# Patient Record
Sex: MALE | Race: WHITE | Employment: OTHER | URBAN - METROPOLITAN AREA
[De-identification: names, ages, dates, MRNs, and addresses within clinical notes are randomized per-mention and may not be internally consistent; named-entity substitution may affect disease eponyms.]

---

## 2017-04-25 PROBLEM — E66.9 OBESITY: Status: ACTIVE | Noted: 2017-04-25

## 2017-04-25 PROBLEM — R06.02 SOB (SHORTNESS OF BREATH): Status: ACTIVE | Noted: 2017-04-25

## 2017-04-28 PROBLEM — G62.9 NERVE DISEASE, PERIPHERAL: Status: ACTIVE | Noted: 2017-04-28

## 2017-04-28 PROBLEM — M19.90 ARTHRITIS, DEGENERATIVE: Status: ACTIVE | Noted: 2017-04-28

## 2017-04-28 PROBLEM — E66.01 MORBID OBESITY DUE TO EXCESS CALORIES (HCC): Status: ACTIVE | Noted: 2017-04-25

## 2017-04-28 PROBLEM — I10 BP (HIGH BLOOD PRESSURE): Status: ACTIVE | Noted: 2017-04-28

## 2017-04-28 PROBLEM — H40.9 GLAUCOMA: Status: ACTIVE | Noted: 2017-04-28

## 2017-04-28 PROBLEM — E78.5 HYPERLIPIDEMIA: Status: ACTIVE | Noted: 2017-04-28

## 2017-04-28 PROBLEM — I11.9 HCD (HYPERTENSIVE CARDIOVASCULAR DISEASE): Status: ACTIVE | Noted: 2017-04-28

## 2017-05-11 ENCOUNTER — TELEPHONE (OUTPATIENT)
Dept: CARDIOTHORACIC SURGERY | Age: 67
End: 2017-05-11

## 2017-05-16 ENCOUNTER — TELEPHONE (OUTPATIENT)
Dept: CARDIOTHORACIC SURGERY | Age: 67
End: 2017-05-16

## 2017-05-16 PROBLEM — G47.00 INSOMNIA: Status: ACTIVE | Noted: 2017-05-16

## 2017-05-16 PROBLEM — E78.5 HYPERLIPIDEMIA: Chronic | Status: ACTIVE | Noted: 2017-04-28

## 2017-05-16 PROBLEM — N17.9 AKI (ACUTE KIDNEY INJURY) (HCC): Status: ACTIVE | Noted: 2017-05-16

## 2017-05-16 PROBLEM — J96.01 ACUTE HYPOXEMIC RESPIRATORY FAILURE (HCC): Status: ACTIVE | Noted: 2017-05-16

## 2017-05-16 PROBLEM — E16.2 HYPOGLYCEMIA: Status: ACTIVE | Noted: 2017-05-16

## 2017-05-16 PROBLEM — A41.9 SEPSIS (HCC): Status: ACTIVE | Noted: 2017-05-16

## 2017-05-16 PROBLEM — E66.01 MORBID OBESITY WITH BMI OF 70 AND OVER, ADULT (HCC): Chronic | Status: ACTIVE | Noted: 2017-04-25

## 2017-05-16 PROBLEM — R77.8 ELEVATED TROPONIN: Status: ACTIVE | Noted: 2017-05-16

## 2017-05-16 PROBLEM — R06.01 ORTHOPNEA: Status: ACTIVE | Noted: 2017-05-16

## 2017-05-16 PROBLEM — R79.89 ELEVATED TROPONIN: Status: ACTIVE | Noted: 2017-05-16

## 2017-05-16 PROBLEM — J18.9 LLL PNEUMONIA: Status: ACTIVE | Noted: 2017-05-16

## 2017-05-16 PROBLEM — D64.9 ANEMIA: Chronic | Status: ACTIVE | Noted: 2017-05-16

## 2017-05-16 PROBLEM — D64.9 ANEMIA: Status: ACTIVE | Noted: 2017-05-16

## 2017-05-17 PROBLEM — E78.2 MIXED HYPERLIPIDEMIA: Chronic | Status: ACTIVE | Noted: 2017-04-28

## 2017-06-01 ENCOUNTER — OFFICE VISIT (OUTPATIENT)
Dept: CARDIOTHORACIC SURGERY | Age: 67
End: 2017-06-01

## 2017-06-01 VITALS
SYSTOLIC BLOOD PRESSURE: 110 MMHG | BODY MASS INDEX: 49.44 KG/M2 | HEART RATE: 66 BPM | DIASTOLIC BLOOD PRESSURE: 60 MMHG | WEIGHT: 315 LBS | HEIGHT: 67 IN | OXYGEN SATURATION: 96 %

## 2017-06-01 DIAGNOSIS — I48.0 PAROXYSMAL A-FIB (HCC): Primary | ICD-10-CM

## 2017-06-01 DIAGNOSIS — I25.110 CORONARY ARTERY DISEASE INVOLVING NATIVE CORONARY ARTERY OF NATIVE HEART WITH UNSTABLE ANGINA PECTORIS (HCC): ICD-10-CM

## 2017-06-01 PROCEDURE — 99024 POSTOP FOLLOW-UP VISIT: CPT | Performed by: THORACIC SURGERY (CARDIOTHORACIC VASCULAR SURGERY)

## 2017-06-01 RX ORDER — ALLOPURINOL 100 MG/1
100 TABLET ORAL DAILY
COMMUNITY
End: 2017-06-01 | Stop reason: CLARIF

## 2017-06-01 RX ORDER — AMIODARONE HYDROCHLORIDE 200 MG/1
200 TABLET ORAL DAILY
COMMUNITY
End: 2017-06-16 | Stop reason: SDUPTHER

## 2017-06-01 RX ORDER — ATORVASTATIN CALCIUM 20 MG/1
20 TABLET, FILM COATED ORAL DAILY
COMMUNITY

## 2017-06-01 RX ORDER — ATORVASTATIN CALCIUM 40 MG/1
40 TABLET, FILM COATED ORAL DAILY
COMMUNITY
End: 2017-06-01 | Stop reason: DRUGHIGH

## 2017-06-01 RX ORDER — CEPHALEXIN 500 MG/1
500 CAPSULE ORAL 2 TIMES DAILY
COMMUNITY
End: 2017-06-01 | Stop reason: CLARIF

## 2017-06-01 RX ORDER — BUPROPION HYDROCHLORIDE 150 MG/1
150 TABLET ORAL EVERY MORNING
COMMUNITY
End: 2018-12-07

## 2017-06-01 RX ORDER — OXYCODONE HYDROCHLORIDE AND ACETAMINOPHEN 5; 325 MG/1; MG/1
1 TABLET ORAL EVERY 8 HOURS PRN
COMMUNITY
End: 2017-06-21 | Stop reason: ALTCHOICE

## 2017-06-04 PROBLEM — L03.90 CELLULITIS: Status: ACTIVE | Noted: 2017-06-04

## 2017-06-04 PROBLEM — T14.8XXA WOUND INFECTION: Status: ACTIVE | Noted: 2017-06-04

## 2017-06-04 PROBLEM — L08.9 WOUND INFECTION: Status: ACTIVE | Noted: 2017-06-04

## 2017-06-04 PROBLEM — T81.30XA WOUND DEHISCENCE: Status: ACTIVE | Noted: 2017-06-04

## 2017-06-15 ENCOUNTER — TELEPHONE (OUTPATIENT)
Dept: CARDIOTHORACIC SURGERY | Age: 67
End: 2017-06-15

## 2017-06-16 RX ORDER — HYDRALAZINE HYDROCHLORIDE 25 MG/1
25 TABLET, FILM COATED ORAL EVERY 12 HOURS SCHEDULED
Qty: 20 TABLET | Refills: 0 | Status: SHIPPED | OUTPATIENT
Start: 2017-06-16 | End: 2017-06-21 | Stop reason: SDUPTHER

## 2017-06-16 RX ORDER — AMIODARONE HYDROCHLORIDE 200 MG/1
200 TABLET ORAL DAILY
Qty: 10 TABLET | Refills: 0 | Status: SHIPPED | OUTPATIENT
Start: 2017-06-16 | End: 2017-06-21 | Stop reason: ALTCHOICE

## 2017-06-21 ENCOUNTER — HOSPITAL ENCOUNTER (OUTPATIENT)
Dept: OTHER | Age: 67
Discharge: HOME | End: 2017-06-21
Attending: NURSE PRACTITIONER | Admitting: NURSE PRACTITIONER

## 2017-06-21 ENCOUNTER — OFFICE VISIT (OUTPATIENT)
Dept: CARDIOLOGY CLINIC | Age: 67
End: 2017-06-21

## 2017-06-21 VITALS
HEART RATE: 60 BPM | BODY MASS INDEX: 49.44 KG/M2 | WEIGHT: 315 LBS | DIASTOLIC BLOOD PRESSURE: 66 MMHG | OXYGEN SATURATION: 97 % | HEIGHT: 67 IN | SYSTOLIC BLOOD PRESSURE: 112 MMHG

## 2017-06-21 DIAGNOSIS — I50.32 CHRONIC DIASTOLIC CONGESTIVE HEART FAILURE (HCC): ICD-10-CM

## 2017-06-21 DIAGNOSIS — Z99.89 OSA ON CPAP: ICD-10-CM

## 2017-06-21 DIAGNOSIS — T81.40XD POSTOPERATIVE INFECTION, SUBSEQUENT ENCOUNTER: ICD-10-CM

## 2017-06-21 DIAGNOSIS — E66.01 MORBID OBESITY WITH BMI OF 70 AND OVER, ADULT (HCC): ICD-10-CM

## 2017-06-21 DIAGNOSIS — I48.0 PAROXYSMAL A-FIB (HCC): ICD-10-CM

## 2017-06-21 DIAGNOSIS — I48.3 TYPICAL ATRIAL FLUTTER (HCC): ICD-10-CM

## 2017-06-21 DIAGNOSIS — E78.2 MIXED HYPERLIPIDEMIA: ICD-10-CM

## 2017-06-21 DIAGNOSIS — I10 ESSENTIAL HYPERTENSION: ICD-10-CM

## 2017-06-21 DIAGNOSIS — I26.99 OTHER PULMONARY EMBOLISM WITHOUT ACUTE COR PULMONALE, UNSPECIFIED CHRONICITY (HCC): ICD-10-CM

## 2017-06-21 DIAGNOSIS — G47.33 OSA ON CPAP: ICD-10-CM

## 2017-06-21 DIAGNOSIS — I25.110 CORONARY ARTERY DISEASE INVOLVING NATIVE CORONARY ARTERY OF NATIVE HEART WITH UNSTABLE ANGINA PECTORIS (HCC): Primary | ICD-10-CM

## 2017-06-21 LAB
ANION GAP SERPL CALCULATED.3IONS-SCNC: 11 MMOL/L (ref 3–16)
BUN BLDV-MCNC: 20 MG/DL (ref 7–20)
CALCIUM SERPL-MCNC: 9.1 MG/DL (ref 8.3–10.6)
CHLORIDE BLD-SCNC: 97 MMOL/L (ref 99–110)
CO2: 31 MMOL/L (ref 21–32)
CREAT SERPL-MCNC: 1.2 MG/DL (ref 0.8–1.3)
GFR AFRICAN AMERICAN: >60
GFR NON-AFRICAN AMERICAN: >60
GLUCOSE BLD-MCNC: 271 MG/DL (ref 70–99)
POTASSIUM SERPL-SCNC: 4.1 MMOL/L (ref 3.5–5.1)
PRO-BNP: 684 PG/ML (ref 0–124)
SODIUM BLD-SCNC: 139 MMOL/L (ref 136–145)

## 2017-06-21 PROCEDURE — 93000 ELECTROCARDIOGRAM COMPLETE: CPT | Performed by: NURSE PRACTITIONER

## 2017-06-21 PROCEDURE — 99214 OFFICE O/P EST MOD 30 MIN: CPT | Performed by: NURSE PRACTITIONER

## 2017-06-21 RX ORDER — HYDRALAZINE HYDROCHLORIDE 25 MG/1
25 TABLET, FILM COATED ORAL 2 TIMES DAILY
Qty: 60 TABLET | Refills: 3 | Status: SHIPPED | OUTPATIENT
Start: 2017-06-21 | End: 2017-06-23 | Stop reason: ALTCHOICE

## 2017-06-23 ENCOUNTER — TELEPHONE (OUTPATIENT)
Dept: CARDIOLOGY CLINIC | Age: 67
End: 2017-06-23

## 2017-06-23 DIAGNOSIS — Z79.899 MEDICATION MANAGEMENT: Primary | ICD-10-CM

## 2017-06-23 RX ORDER — DOXYCYCLINE HYCLATE 100 MG/1
100 CAPSULE ORAL 2 TIMES DAILY
COMMUNITY
End: 2017-07-05 | Stop reason: ALTCHOICE

## 2017-06-23 RX ORDER — LISINOPRIL 10 MG/1
10 TABLET ORAL DAILY
Qty: 30 TABLET | Refills: 3 | Status: SHIPPED | OUTPATIENT
Start: 2017-06-23 | End: 2017-07-26 | Stop reason: ALTCHOICE

## 2017-06-23 RX ORDER — SULFAMETHOXAZOLE AND TRIMETHOPRIM 800; 160 MG/1; MG/1
1 TABLET ORAL 2 TIMES DAILY
COMMUNITY
End: 2017-07-05 | Stop reason: ALTCHOICE

## 2017-06-27 ENCOUNTER — HOSPITAL ENCOUNTER (OUTPATIENT)
Dept: OTHER | Age: 67
Discharge: HOME | End: 2017-06-27
Attending: NURSE PRACTITIONER | Admitting: NURSE PRACTITIONER

## 2017-06-27 ENCOUNTER — TELEPHONE (OUTPATIENT)
Dept: CARDIOLOGY CLINIC | Age: 67
End: 2017-06-27

## 2017-06-27 LAB
ANION GAP SERPL CALCULATED.3IONS-SCNC: 11 MMOL/L (ref 3–16)
BUN BLDV-MCNC: 21 MG/DL (ref 7–20)
CALCIUM SERPL-MCNC: 9.7 MG/DL (ref 8.3–10.6)
CHLORIDE BLD-SCNC: 96 MMOL/L (ref 99–110)
CO2: 31 MMOL/L (ref 21–32)
CREAT SERPL-MCNC: 1.1 MG/DL (ref 0.8–1.3)
GFR AFRICAN AMERICAN: >60
GFR NON-AFRICAN AMERICAN: >60
GLUCOSE BLD-MCNC: 266 MG/DL (ref 70–99)
POTASSIUM SERPL-SCNC: 4.6 MMOL/L (ref 3.5–5.1)
PRO-BNP: 591 PG/ML (ref 0–124)
SODIUM BLD-SCNC: 138 MMOL/L (ref 136–145)

## 2017-06-30 ENCOUNTER — TELEPHONE (OUTPATIENT)
Dept: CARDIOLOGY CLINIC | Age: 67
End: 2017-06-30

## 2017-07-05 ENCOUNTER — OFFICE VISIT (OUTPATIENT)
Dept: CARDIOTHORACIC SURGERY | Age: 67
End: 2017-07-05

## 2017-07-05 VITALS
WEIGHT: 315 LBS | TEMPERATURE: 98.3 F | OXYGEN SATURATION: 96 % | BODY MASS INDEX: 49.44 KG/M2 | HEIGHT: 67 IN | HEART RATE: 63 BPM | SYSTOLIC BLOOD PRESSURE: 118 MMHG | DIASTOLIC BLOOD PRESSURE: 60 MMHG

## 2017-07-05 DIAGNOSIS — L08.9 WOUND INFECTION: ICD-10-CM

## 2017-07-05 DIAGNOSIS — I25.110 CORONARY ARTERY DISEASE INVOLVING NATIVE CORONARY ARTERY OF NATIVE HEART WITH UNSTABLE ANGINA PECTORIS (HCC): Primary | ICD-10-CM

## 2017-07-05 DIAGNOSIS — T14.8XXA WOUND INFECTION: ICD-10-CM

## 2017-07-05 PROCEDURE — 99024 POSTOP FOLLOW-UP VISIT: CPT | Performed by: THORACIC SURGERY (CARDIOTHORACIC VASCULAR SURGERY)

## 2017-07-12 ENCOUNTER — OFFICE VISIT (OUTPATIENT)
Dept: CARDIOTHORACIC SURGERY | Age: 67
End: 2017-07-12

## 2017-07-12 VITALS
SYSTOLIC BLOOD PRESSURE: 110 MMHG | HEART RATE: 67 BPM | WEIGHT: 315 LBS | OXYGEN SATURATION: 95 % | BODY MASS INDEX: 49.44 KG/M2 | HEIGHT: 67 IN | DIASTOLIC BLOOD PRESSURE: 70 MMHG | TEMPERATURE: 98 F

## 2017-07-12 DIAGNOSIS — I25.110 CORONARY ARTERY DISEASE INVOLVING NATIVE CORONARY ARTERY OF NATIVE HEART WITH UNSTABLE ANGINA PECTORIS (HCC): Primary | ICD-10-CM

## 2017-07-12 PROCEDURE — 99024 POSTOP FOLLOW-UP VISIT: CPT | Performed by: THORACIC SURGERY (CARDIOTHORACIC VASCULAR SURGERY)

## 2017-07-12 RX ORDER — POTASSIUM CHLORIDE 20 MEQ/1
20 TABLET, EXTENDED RELEASE ORAL DAILY
COMMUNITY
End: 2017-08-24 | Stop reason: DRUGHIGH

## 2017-07-17 ENCOUNTER — TELEPHONE (OUTPATIENT)
Dept: CARDIOTHORACIC SURGERY | Age: 67
End: 2017-07-17

## 2017-07-18 ENCOUNTER — OFFICE VISIT (OUTPATIENT)
Dept: CARDIOTHORACIC SURGERY | Age: 67
End: 2017-07-18

## 2017-07-18 VITALS
OXYGEN SATURATION: 94 % | SYSTOLIC BLOOD PRESSURE: 94 MMHG | TEMPERATURE: 97.9 F | HEART RATE: 65 BPM | DIASTOLIC BLOOD PRESSURE: 60 MMHG | HEIGHT: 67 IN | WEIGHT: 315 LBS | BODY MASS INDEX: 49.44 KG/M2

## 2017-07-18 DIAGNOSIS — L03.818 CELLULITIS OF OTHER SPECIFIED SITE: Primary | ICD-10-CM

## 2017-07-18 PROCEDURE — 99024 POSTOP FOLLOW-UP VISIT: CPT | Performed by: THORACIC SURGERY (CARDIOTHORACIC VASCULAR SURGERY)

## 2017-07-18 RX ORDER — AMOXICILLIN AND CLAVULANATE POTASSIUM 875; 125 MG/1; MG/1
1 TABLET, FILM COATED ORAL 2 TIMES DAILY
Qty: 28 TABLET | Refills: 0 | Status: SHIPPED | OUTPATIENT
Start: 2017-07-18 | End: 2017-08-01

## 2017-07-26 ENCOUNTER — OFFICE VISIT (OUTPATIENT)
Dept: CARDIOTHORACIC SURGERY | Age: 67
End: 2017-07-26

## 2017-07-26 VITALS
BODY MASS INDEX: 49.44 KG/M2 | HEIGHT: 67 IN | HEART RATE: 56 BPM | OXYGEN SATURATION: 96 % | DIASTOLIC BLOOD PRESSURE: 60 MMHG | TEMPERATURE: 97.9 F | SYSTOLIC BLOOD PRESSURE: 104 MMHG | WEIGHT: 315 LBS

## 2017-07-26 DIAGNOSIS — I50.33 ACUTE ON CHRONIC DIASTOLIC HEART FAILURE (HCC): ICD-10-CM

## 2017-07-26 DIAGNOSIS — E66.01 MORBID OBESITY WITH BMI OF 70 AND OVER, ADULT (HCC): Primary | ICD-10-CM

## 2017-07-26 DIAGNOSIS — T81.31XD WOUND DEHISCENCE, SUBSEQUENT ENCOUNTER: ICD-10-CM

## 2017-07-26 PROCEDURE — 99024 POSTOP FOLLOW-UP VISIT: CPT | Performed by: THORACIC SURGERY (CARDIOTHORACIC VASCULAR SURGERY)

## 2017-08-23 ENCOUNTER — OFFICE VISIT (OUTPATIENT)
Dept: CARDIOTHORACIC SURGERY | Age: 67
End: 2017-08-23

## 2017-08-23 VITALS
HEART RATE: 59 BPM | DIASTOLIC BLOOD PRESSURE: 78 MMHG | WEIGHT: 315 LBS | OXYGEN SATURATION: 96 % | TEMPERATURE: 97.4 F | HEIGHT: 67 IN | SYSTOLIC BLOOD PRESSURE: 132 MMHG | BODY MASS INDEX: 49.44 KG/M2

## 2017-08-23 DIAGNOSIS — I25.110 CORONARY ARTERY DISEASE INVOLVING NATIVE CORONARY ARTERY OF NATIVE HEART WITH UNSTABLE ANGINA PECTORIS (HCC): Primary | ICD-10-CM

## 2017-08-23 DIAGNOSIS — T81.30XA WOUND DEHISCENCE: ICD-10-CM

## 2017-08-23 PROCEDURE — 99024 POSTOP FOLLOW-UP VISIT: CPT | Performed by: THORACIC SURGERY (CARDIOTHORACIC VASCULAR SURGERY)

## 2017-08-24 ENCOUNTER — OFFICE VISIT (OUTPATIENT)
Dept: CARDIOLOGY CLINIC | Age: 67
End: 2017-08-24

## 2017-08-24 VITALS
SYSTOLIC BLOOD PRESSURE: 130 MMHG | WEIGHT: 315 LBS | HEART RATE: 61 BPM | HEIGHT: 67 IN | DIASTOLIC BLOOD PRESSURE: 64 MMHG | OXYGEN SATURATION: 95 % | BODY MASS INDEX: 49.44 KG/M2

## 2017-08-24 DIAGNOSIS — I48.0 PAROXYSMAL A-FIB (HCC): Chronic | ICD-10-CM

## 2017-08-24 DIAGNOSIS — I25.110 CORONARY ARTERY DISEASE INVOLVING NATIVE CORONARY ARTERY OF NATIVE HEART WITH UNSTABLE ANGINA PECTORIS (HCC): Chronic | ICD-10-CM

## 2017-08-24 DIAGNOSIS — I10 ESSENTIAL HYPERTENSION: ICD-10-CM

## 2017-08-24 DIAGNOSIS — I48.3 TYPICAL ATRIAL FLUTTER (HCC): Primary | ICD-10-CM

## 2017-08-24 DIAGNOSIS — E78.2 MIXED HYPERLIPIDEMIA: Primary | ICD-10-CM

## 2017-08-24 DIAGNOSIS — I25.118 CORONARY ARTERY DISEASE OF NATIVE ARTERY OF NATIVE HEART WITH STABLE ANGINA PECTORIS (HCC): ICD-10-CM

## 2017-08-24 PROCEDURE — 93000 ELECTROCARDIOGRAM COMPLETE: CPT | Performed by: INTERNAL MEDICINE

## 2017-08-24 PROCEDURE — 99214 OFFICE O/P EST MOD 30 MIN: CPT | Performed by: INTERNAL MEDICINE

## 2017-08-24 RX ORDER — ASPIRIN 81 MG/1
325 TABLET ORAL DAILY
Qty: 30 TABLET | Refills: 0 | Status: SHIPPED
Start: 2017-08-24 | End: 2017-09-20 | Stop reason: ALTCHOICE

## 2017-08-24 RX ORDER — POTASSIUM CHLORIDE 20 MEQ/1
20 TABLET, EXTENDED RELEASE ORAL 2 TIMES DAILY
Qty: 60 TABLET | Refills: 0 | Status: SHIPPED
Start: 2017-08-24 | End: 2021-06-23

## 2017-09-20 ENCOUNTER — OFFICE VISIT (OUTPATIENT)
Dept: CARDIOTHORACIC SURGERY | Age: 67
End: 2017-09-20

## 2017-09-20 ENCOUNTER — TELEPHONE (OUTPATIENT)
Dept: CARDIOLOGY CLINIC | Age: 67
End: 2017-09-20

## 2017-09-20 VITALS
SYSTOLIC BLOOD PRESSURE: 100 MMHG | WEIGHT: 315 LBS | HEIGHT: 67 IN | OXYGEN SATURATION: 95 % | DIASTOLIC BLOOD PRESSURE: 62 MMHG | HEART RATE: 60 BPM | TEMPERATURE: 97.7 F | BODY MASS INDEX: 49.44 KG/M2

## 2017-09-20 DIAGNOSIS — I48.3 TYPICAL ATRIAL FLUTTER (HCC): ICD-10-CM

## 2017-09-20 DIAGNOSIS — I25.110 CORONARY ARTERY DISEASE INVOLVING NATIVE CORONARY ARTERY OF NATIVE HEART WITH UNSTABLE ANGINA PECTORIS (HCC): ICD-10-CM

## 2017-09-20 DIAGNOSIS — I48.0 PAROXYSMAL A-FIB (HCC): Chronic | ICD-10-CM

## 2017-09-20 DIAGNOSIS — R05.9 COUGH: Primary | ICD-10-CM

## 2017-09-20 PROCEDURE — 99212 OFFICE O/P EST SF 10 MIN: CPT | Performed by: THORACIC SURGERY (CARDIOTHORACIC VASCULAR SURGERY)

## 2017-09-20 PROCEDURE — 93272 ECG/REVIEW INTERPRET ONLY: CPT | Performed by: INTERNAL MEDICINE

## 2017-09-20 RX ORDER — ASPIRIN 325 MG
325 TABLET, DELAYED RELEASE (ENTERIC COATED) ORAL DAILY
Qty: 30 TABLET | Refills: 3 | Status: SHIPPED | OUTPATIENT
Start: 2017-09-20

## 2017-10-09 ENCOUNTER — TELEPHONE (OUTPATIENT)
Dept: CARDIOTHORACIC SURGERY | Age: 67
End: 2017-10-09

## 2017-10-09 NOTE — TELEPHONE ENCOUNTER
Patient underwent overnight pulse ox on 10/5/17. O2 sats indicate that he does not qualify for home O2. I called and spoke to his wife. She verbalizes understanding of results. I emphasized importance of continuing CPAP use but there is no need for for supplemental O2. She states that her  is very good about wearing his CPAP every night. Verbalizes understanding and will send back home O2.

## 2017-10-26 LAB
ALBUMIN SERPL-MCNC: NORMAL G/DL
ALP BLD-CCNC: NORMAL U/L
ALT SERPL-CCNC: NORMAL U/L
ANION GAP SERPL CALCULATED.3IONS-SCNC: NORMAL MMOL/L
AST SERPL-CCNC: NORMAL U/L
AVERAGE GLUCOSE: 189
BASOPHILS ABSOLUTE: ABNORMAL /ΜL
BASOPHILS RELATIVE PERCENT: ABNORMAL %
BILIRUB SERPL-MCNC: NORMAL MG/DL (ref 0.1–1.4)
BUN BLDV-MCNC: NORMAL MG/DL
CALCIUM SERPL-MCNC: NORMAL MG/DL
CHLORIDE BLD-SCNC: NORMAL MMOL/L
CHOLESTEROL, TOTAL: 128 MG/DL
CHOLESTEROL/HDL RATIO: 3.4
CO2: NORMAL MMOL/L
CREAT SERPL-MCNC: NORMAL MG/DL
EOSINOPHILS ABSOLUTE: ABNORMAL /ΜL
EOSINOPHILS RELATIVE PERCENT: ABNORMAL %
GFR CALCULATED: NORMAL
GLUCOSE BLD-MCNC: NORMAL MG/DL
HBA1C MFR BLD: 8.2 %
HCT VFR BLD CALC: 37 % (ref 41–53)
HDLC SERPL-MCNC: 70 MG/DL (ref 35–70)
HEMOGLOBIN: 11.8 G/DL (ref 13.5–17.5)
LDL CHOLESTEROL CALCULATED: NORMAL MG/DL (ref 0–160)
LYMPHOCYTES ABSOLUTE: ABNORMAL /ΜL
LYMPHOCYTES RELATIVE PERCENT: ABNORMAL %
MCH RBC QN AUTO: 25.8 PG
MCHC RBC AUTO-ENTMCNC: 32 G/DL
MCV RBC AUTO: 80.6 FL
MONOCYTES ABSOLUTE: ABNORMAL /ΜL
MONOCYTES RELATIVE PERCENT: ABNORMAL %
NEUTROPHILS ABSOLUTE: ABNORMAL /ΜL
NEUTROPHILS RELATIVE PERCENT: ABNORMAL %
PLATELET # BLD: 257 K/ΜL
PMV BLD AUTO: 7.8 FL
POTASSIUM SERPL-SCNC: NORMAL MMOL/L
RBC # BLD: 4.59 10^6/ΜL
SODIUM BLD-SCNC: NORMAL MMOL/L
TOTAL PROTEIN: NORMAL
TRIGL SERPL-MCNC: 101 MG/DL
VLDLC SERPL CALC-MCNC: NORMAL MG/DL
WBC # BLD: 6.7 10^3/ML

## 2017-10-27 LAB
ALBUMIN SERPL-MCNC: 4 G/DL
ALP BLD-CCNC: 74 U/L
ALT SERPL-CCNC: 15 U/L
ANION GAP SERPL CALCULATED.3IONS-SCNC: 1.1 MMOL/L
AST SERPL-CCNC: 16 U/L
BILIRUB SERPL-MCNC: 0.4 MG/DL (ref 0.1–1.4)
BUN BLDV-MCNC: 20 MG/DL
CALCIUM SERPL-MCNC: 10.1 MG/DL
CHLORIDE BLD-SCNC: 101 MMOL/L
CO2: 31 MMOL/L
CREAT SERPL-MCNC: 0.96 MG/DL
GFR CALCULATED: 78
GLUCOSE BLD-MCNC: 156 MG/DL
POTASSIUM SERPL-SCNC: 4.5 MMOL/L
SODIUM BLD-SCNC: 140 MMOL/L
TOTAL PROTEIN: 7.5

## 2017-12-14 ENCOUNTER — OFFICE VISIT (OUTPATIENT)
Dept: CARDIOLOGY CLINIC | Age: 67
End: 2017-12-14

## 2017-12-14 VITALS
WEIGHT: 315 LBS | BODY MASS INDEX: 49.44 KG/M2 | SYSTOLIC BLOOD PRESSURE: 106 MMHG | HEIGHT: 67 IN | OXYGEN SATURATION: 97 % | HEART RATE: 60 BPM | DIASTOLIC BLOOD PRESSURE: 80 MMHG

## 2017-12-14 DIAGNOSIS — I25.110 CORONARY ARTERY DISEASE INVOLVING NATIVE CORONARY ARTERY OF NATIVE HEART WITH UNSTABLE ANGINA PECTORIS (HCC): Chronic | ICD-10-CM

## 2017-12-14 DIAGNOSIS — I48.0 PAROXYSMAL A-FIB (HCC): Primary | Chronic | ICD-10-CM

## 2017-12-14 DIAGNOSIS — I50.33 ACUTE ON CHRONIC DIASTOLIC HEART FAILURE (HCC): ICD-10-CM

## 2017-12-14 DIAGNOSIS — E78.2 MIXED HYPERLIPIDEMIA: Chronic | ICD-10-CM

## 2017-12-14 DIAGNOSIS — I11.9 HYPERTENSIVE HEART DISEASE WITHOUT HEART FAILURE: ICD-10-CM

## 2017-12-14 PROCEDURE — 99214 OFFICE O/P EST MOD 30 MIN: CPT | Performed by: INTERNAL MEDICINE

## 2017-12-14 RX ORDER — DULOXETIN HYDROCHLORIDE 30 MG/1
30 CAPSULE, DELAYED RELEASE ORAL DAILY
COMMUNITY
End: 2018-12-07

## 2017-12-14 RX ORDER — LATANOPROST 50 UG/ML
1 SOLUTION/ DROPS OPHTHALMIC NIGHTLY
COMMUNITY

## 2017-12-14 RX ORDER — SULFAMETHOXAZOLE AND TRIMETHOPRIM 800; 160 MG/1; MG/1
1 TABLET ORAL 2 TIMES DAILY
COMMUNITY
End: 2018-12-07

## 2017-12-14 RX ORDER — AMOXICILLIN AND CLAVULANATE POTASSIUM 875; 125 MG/1; MG/1
1 TABLET, FILM COATED ORAL 2 TIMES DAILY
COMMUNITY
End: 2018-06-21 | Stop reason: ALTCHOICE

## 2017-12-14 NOTE — PROGRESS NOTES
[address]   Cardiac Consultation    Referring Provider:  Ankit Zambrano MD     Chief Complaint   Patient presents with    Follow-up    Other     burning feeling in chest      Subjective: Zev Meyer presents today for chronic dCHF, aflutter, AFIB, s/p CABG 2V; c/o rare right CP with movement and baseline SOB but improved    History of Present Illness:  Mr. Prasad Hamilton is a 74yo male with a history of morbid obesity, CAD s/p LAD DELFINO March 2015, history of diabetes, sleep apnea on CPAP, history of PE, colon cancer s/p surgery, and arthritis. Admitted to [facility] 04/25/2017 with acute SOB. He is r/o'd for MI but what concerned me were ST changes in the inferior and anterolateral leads that are c/w possible ischemia and these are new findings compared to prior EKG study in July 2016. He underwent LHC by Dr. Belma Babinski 4/27/17 showing multivessel CAD with high grade left main disease extending into circumflex and LAD as well as high grade mid LAD disease distal to a previously placed patent stent and markedly elevated left ventricular filling pressure. Note 50% diffuse mid-RCA stenoses, 70% LM, 90% ostial L. CCx, 90% ostial OM#1 and 95% mid-LAD after stent. He subsequently underwent s/p 2V CABG 5/2/17 by Dr. Hina Monroe. He developed A fib/Aflutter during his hospital stay. He was started on Eliquis at that time. He was asymptomatic with this. He went to rehab following discharge from hospital and developed MRSA in his right leg where vein was harvested for surgery and has undergone IV abx, wound vac, dressing changes. Note most recent ECHO 5/16/17 showed ejection fraction is visually estimated to be 55-60 %. Note EKG 8/24/17 showed NSR, 1st degree AV block, RBBB. Most recent 2 week event monitor 8/29-9/14/17 showed NSR avg HR 63 bpm, no major issues   Today he reports feeling OK overall. He reports occasional right chest burning which Dr. Hina Monroe felt was related to CABG.     Denies chest pain, shortness of breath, edema, dizziness, palpitations and syncope. Past Medical History:   has a past medical history of Arthritis; Asthma; BiPAP (biphasic positive airway pressure) dependence; CHF (congestive heart failure) ([facility] Colon cancer ([facility] Depression; Diabetes mellitus (HCC); MRSA (methicillin resistant staph aureus) culture positive; PE (pulmonary embolism); Sleep apnea; and Vitamin D deficiency. Surgical History:   has a past surgical history that includes Knee Arthroplasty (Left); Colon surgery; hernia repair; Coronary angioplasty with stent (03/04/2015); Total shoulder arthroplasty (Right); Cardiac catheterization (04/27/2017); and Coronary artery bypass graft (05/02/2017). Social History:   reports that he has quit smoking. His smoking use included Pipe. He has never used smokeless tobacco. He reports that he drinks alcohol. He reports that he uses drugs, including Marijuana. Family History:  family history includes Heart Disease in his brother and mother. Home Medications:  Prior to Admission medications    Medication Sig Start Date End Date Taking?  Authorizing Provider   sulfamethoxazole-trimethoprim (BACTRIM DS;SEPTRA DS) 800-160 MG per tablet Take 1 tablet by mouth 2 times daily   Yes Historical Provider, MD   amoxicillin-clavulanate (AUGMENTIN) 875-125 MG per tablet Take 1 tablet by mouth 2 times daily   Yes Historical Provider, MD   DULoxetine (CYMBALTA) 30 MG extended release capsule Take 30 mg by mouth daily   Yes Historical Provider, MD   timolol (BETIMOL) 0.5 % ophthalmic solution 1 drop 2 times daily   Yes Historical Provider, MD   latanoprost (XALATAN) 0.005 % ophthalmic solution 1 drop nightly   Yes Historical Provider, MD   aspirin 325 MG EC tablet Take 1 tablet by mouth daily 9/20/17  Yes Jones Love MD   potassium chloride (KLOR-CON M) 20 MEQ extended release tablet Take 1 tablet by mouth 2 times daily 8/24/17  Yes Antoine Castro MD   atorvastatin (LIPITOR) 20 MG tablet Take 20 mg by mouth daily   Yes Historical Provider, MD   insulin glargine (LANTUS) 100 UNIT/ML injection vial Inject 35 Units into the skin 2 times daily  Patient taking differently: Inject 50 Units into the skin 2 times daily  5/23/17  Yes Anita Quiroz MD   insulin lispro (HUMALOG) 100 UNIT/ML injection vial Inject 0-18 Units into the skin 3 times daily (with meals)  Patient taking differently: Inject 0-18 Units into the skin 3 times daily (with meals) Indications: 10 U + sliding sclae  5/23/17  Yes Anita Quiroz MD   furosemide (LASIX) 80 MG tablet Take 1 tablet by mouth 2 times daily 5/23/17  Yes Anita Quiroz MD   metoprolol tartrate (LOPRESSOR) 50 MG tablet Take 1 tablet by mouth 2 times daily 5/9/17  Yes Annita Villalta CNP   metFORMIN (GLUCOPHAGE) 1000 MG tablet Take 1,000 mg by mouth 2 times daily (with meals) 4/2/17  Yes Historical Provider, MD   albuterol (PROVENTIL HFA;VENTOLIN HFA) 108 (90 BASE) MCG/ACT inhaler Inhale 2 puffs into the lungs every 6 hours as needed for Wheezing. Yes Historical Provider, MD   buPROPion (WELLBUTRIN XL) 150 MG extended release tablet Take 150 mg by mouth every morning    Historical Provider, MD        Allergies:  Stadol [butorphanol tartrate]     Review of Systems:   · Constitutional: there has been no unanticipated weight loss. There's been no change in energy level, sleep pattern, or activity level. · Eyes: No visual changes or diplopia. No scleral icterus. · ENT: No Headaches, hearing loss or vertigo. No mouth sores or sore throat. · Cardiovascular: Reviewed in HPI  · Respiratory: No cough or wheezing, no sputum production. No hematemesis. · Gastrointestinal: No abdominal pain, appetite loss, blood in stools. No change in bowel or bladder habits. · Genitourinary: No dysuria, trouble voiding, or hematuria. · Musculoskeletal:  No gait disturbance, weakness or joint complaints. · Integumentary: No rash or pruritis.   · Neurological: No headache, diplopia, change in muscle strength, numbness or tingling. No change in gait, balance, coordination, mood, affect, memory, mentation, behavior. · Psychiatric: No anxiety, no depression. · Endocrine: No malaise, fatigue or temperature intolerance. No excessive thirst, fluid intake, or urination. No tremor. · Hematologic/Lymphatic: No abnormal bruising or bleeding, blood clots or swollen lymph nodes. · Allergic/Immunologic: No nasal congestion or hives. Physical Examination:    Vitals:    12/14/17 1412   BP: 106/80   Pulse: 60   SpO2: 97%        Constitutional and General Appearance: NAD; morbidly obese  Respiratory:  · Normal excursion and expansion without use of accessory muscles  · Resp Auscultation: Normal breath sounds without dullness  Cardiovascular:  · The apical impulses not displaced  · Heart tones are crisp and normal  · Cervical veins are not engorged  · The carotid upstroke is normal in amplitude and contour without delay or bruit  · Soft S1S2, No S3, No Murmur  · Peripheral pulses are symmetrical and full  · There is no clubbing, cyanosis of the extremities. · Trace LLE edema and right lower leg bandaged   · Femoral Arteries: 2+ and equal  · Pedal Pulses: 2+ and equal   Abdomen:  · Morbidly obese, No masses or tenderness  · Liver/Spleen: No Abnormalities Noted  Neurological/Psychiatric:  · Alert and oriented in all spheres  · Moves all extremities well  · Exhibits normal gait balance and coordination  · No abnormalities of mood, affect, memory, mentation, or behavior are noted  Skin:  · Skin: warm and dry. Echo 5/16/2017:  Technically difficult examination. Pt supine and inclined secondary to habitus, difficulty breathing and recent CABG. Ejection fraction is visually estimated to be 55-60 %. No evidence of any pericardial effusion. Echo 4/28/2017:  Technical difficult study due to body habitus.    Due to this limitation, regional wall motion and full valvular assessment is compromised. The left ventricular systolic function is normal with an ejection fraction of 55 %-60%. Moderate concentric left ventricular hypertrophy. Normal left ventricular diastolic filling pressure     CT surgery 5/2/2017 [facility]):  Coronary artery bypass ×2 reverse saphenous vein to LAD reverse saphenous vein to circumflex  Sternal plating using a told X plate 4 hole box plate and 8 JL plate     [facility]02 [facility]  1. High grade left main disease extending into circumflex and LAD as well as high grade mid LAD disease distal to a previously placed patent stent. 2. Markedly elevated left ventricular filling pressure. [facility] 3/4/2015 ([facility]):  1. Left Cardiac Catheterization  2. Selective Coronary Angiography  3. Left Ventriculography  4. Coronary artery angioplasty: PCI of mid LAD with DELFINO    Lab Results   Component Value Date    CHOL 128 10/26/2017    CHOL 123 04/26/2017    CHOL 177 04/06/2011     Lab Results   Component Value Date    TRIG 101 10/26/2017    TRIG 160 (H) 04/26/2017    TRIG 168 04/06/2011     Lab Results   Component Value Date    HDL 70 10/26/2017    HDL 39 (L) 04/26/2017    HDL 33 04/06/2011     Lab Results   Component Value Date    LDLCALC 52 04/26/2017    LDLCALC 110 04/06/2011     Lab Results   Component Value Date    LABVLDL 32 04/26/2017    LABVLDL 34 04/06/2011     Lab Results   Component Value Date    CHOLHDLRATIO 3.4 10/26/2017       Assessment:     1. Typical atrial flutter [facility]): Currently NSR and denies any palpitations. Note EKG August OV showed NSR, 1st degree AV block, RBBB. He had PAF during hospitalization post-CABG. Note amiodarone stopped June 2017. Most recent 2 week event monitor 8/29-9/14/17 showed NSR avg HR 63 bpm, no major issues. I d/c'd eliquis and will continue adult aspirin. 2. CAD s/p CABGx2 (April 2017) & stents (2015):  He is s/p LAD DELFINO March 2015.  He underwent LHC by Dr. General Riggins 4/27/17 showing multivessel CAD with high grade left main disease extending into circumflex and LAD as well as high grade mid LAD disease distal to a previously placed patent stent and markedly elevated left ventricular filling pressure. Note 50% diffuse mid-RCA stenoses, 70% LM, 90% ostial L. CCx, 90% ostial OM#1 and 95% mid-LAD after stent. He subsequently underwent s/p 2V CABG 5/2/17 by Dr. Sharma [facility]. There are no concerning symptoms for angina currently. Atypical right CP unlikely to represent angina. 3. Paroxysmal a-fib ([address]):  See #1 above. Resolved. 4. Essential hypertension: Well controlled and will continue current medical regimen. Note lisinopril stopped earlier due to hypotension. NO need for ACE-I due to normal LVEF.        5       Hyperlipidemia:  Last lipids 10/26/17 see above results I personally reviewed. Well controlled and will continue current medical regimen. Plan:  1. Continue to monitor blood pressure. Goal <140/90  2. No need for cardiac testing at this time. 3. Follow up with me in 6 months  4.recheck fasting lipids, cmp in June  5. OK to take OTC cold meds short term use    Cost of prescription medications and patient compliance have been reviewed with patient. All questions answered. Thank you for allowing me to participate in the care of this individual.    Luca Brewster.  Durell Gaucher, M.D., [facility]

## 2017-12-14 NOTE — PATIENT INSTRUCTIONS
Plan:  1. Continue to monitor blood pressure. Goal <140/90  2. No need for cardiac testing at this time. 3. Follow up with me in 6 months  4.recheck fasting lipids, cmp in June  5.  OK to take OTC cold meds short term use

## 2018-01-02 NOTE — COMMUNICATION BODY
MG tablet Take 20 mg by mouth daily   Yes Historical Provider, MD   insulin glargine (LANTUS) 100 UNIT/ML injection vial Inject 35 Units into the skin 2 times daily  Patient taking differently: Inject 50 Units into the skin 2 times daily  5/23/17  Yes Myrtle Tellez MD   insulin lispro (HUMALOG) 100 UNIT/ML injection vial Inject 0-18 Units into the skin 3 times daily (with meals)  Patient taking differently: Inject 0-18 Units into the skin 3 times daily (with meals) Indications: 10 U + sliding sclae  5/23/17  Yes Myrtle Tellez MD   furosemide (LASIX) 80 MG tablet Take 1 tablet by mouth 2 times daily 5/23/17  Yes Myrtle Tellez MD   metoprolol tartrate (LOPRESSOR) 50 MG tablet Take 1 tablet by mouth 2 times daily 5/9/17  Yes Annita Villalta CNP   metFORMIN (GLUCOPHAGE) 1000 MG tablet Take 1,000 mg by mouth 2 times daily (with meals) 4/2/17  Yes Historical Provider, MD   albuterol (PROVENTIL HFA;VENTOLIN HFA) 108 (90 BASE) MCG/ACT inhaler Inhale 2 puffs into the lungs every 6 hours as needed for Wheezing. Yes Historical Provider, MD   buPROPion (WELLBUTRIN XL) 150 MG extended release tablet Take 150 mg by mouth every morning    Historical Provider, MD        Allergies:  Stadol [butorphanol tartrate]     Review of Systems:   · Constitutional: there has been no unanticipated weight loss. There's been no change in energy level, sleep pattern, or activity level. · Eyes: No visual changes or diplopia. No scleral icterus. · ENT: No Headaches, hearing loss or vertigo. No mouth sores or sore throat. · Cardiovascular: Reviewed in HPI  · Respiratory: No cough or wheezing, no sputum production. No hematemesis. · Gastrointestinal: No abdominal pain, appetite loss, blood in stools. No change in bowel or bladder habits. · Genitourinary: No dysuria, trouble voiding, or hematuria. · Musculoskeletal:  No gait disturbance, weakness or joint complaints. · Integumentary: No rash or pruritis.   · Neurological:

## 2018-06-21 ENCOUNTER — OFFICE VISIT (OUTPATIENT)
Dept: CARDIOLOGY CLINIC | Age: 68
End: 2018-06-21

## 2018-06-21 VITALS
BODY MASS INDEX: 49.44 KG/M2 | HEART RATE: 70 BPM | HEIGHT: 67 IN | DIASTOLIC BLOOD PRESSURE: 80 MMHG | SYSTOLIC BLOOD PRESSURE: 136 MMHG | OXYGEN SATURATION: 97 % | WEIGHT: 315 LBS

## 2018-06-21 DIAGNOSIS — E78.2 MIXED HYPERLIPIDEMIA: Chronic | ICD-10-CM

## 2018-06-21 DIAGNOSIS — I48.0 PAROXYSMAL A-FIB (HCC): Primary | Chronic | ICD-10-CM

## 2018-06-21 DIAGNOSIS — I25.110 CORONARY ARTERY DISEASE INVOLVING NATIVE CORONARY ARTERY OF NATIVE HEART WITH UNSTABLE ANGINA PECTORIS (HCC): Chronic | ICD-10-CM

## 2018-06-21 DIAGNOSIS — I50.33 ACUTE ON CHRONIC DIASTOLIC HEART FAILURE (HCC): ICD-10-CM

## 2018-06-21 DIAGNOSIS — I10 ESSENTIAL HYPERTENSION: ICD-10-CM

## 2018-06-21 PROCEDURE — 99214 OFFICE O/P EST MOD 30 MIN: CPT | Performed by: INTERNAL MEDICINE

## 2018-09-26 PROBLEM — R79.89 ELEVATED TROPONIN: Status: RESOLVED | Noted: 2017-05-16 | Resolved: 2018-09-26

## 2018-09-26 PROBLEM — R77.8 ELEVATED TROPONIN: Status: RESOLVED | Noted: 2017-05-16 | Resolved: 2018-09-26

## 2018-10-17 ENCOUNTER — HOSPITAL ENCOUNTER (EMERGENCY)
Age: 68
Discharge: LEFT BEFORE DISCHARGE | End: 2018-10-18
Attending: EMERGENCY MEDICINE | Admitting: INTERNAL MEDICINE
Payer: MEDICARE

## 2018-10-17 ENCOUNTER — APPOINTMENT (OUTPATIENT)
Dept: GENERAL RADIOLOGY | Age: 68
End: 2018-10-17
Payer: MEDICARE

## 2018-10-17 DIAGNOSIS — R07.1 CHEST PAIN ON BREATHING: Primary | ICD-10-CM

## 2018-10-17 LAB
BASOPHILS ABSOLUTE: 0 K/UL (ref 0–0.2)
BASOPHILS RELATIVE PERCENT: 0.5 %
EOSINOPHILS ABSOLUTE: 0.5 K/UL (ref 0–0.6)
EOSINOPHILS RELATIVE PERCENT: 5.2 %
HCT VFR BLD CALC: 39.8 % (ref 40.5–52.5)
HEMOGLOBIN: 13 G/DL (ref 13.5–17.5)
LYMPHOCYTES ABSOLUTE: 2.9 K/UL (ref 1–5.1)
LYMPHOCYTES RELATIVE PERCENT: 31.5 %
MCH RBC QN AUTO: 28.7 PG (ref 26–34)
MCHC RBC AUTO-ENTMCNC: 32.6 G/DL (ref 31–36)
MCV RBC AUTO: 88.1 FL (ref 80–100)
MONOCYTES ABSOLUTE: 0.7 K/UL (ref 0–1.3)
MONOCYTES RELATIVE PERCENT: 8.1 %
NEUTROPHILS ABSOLUTE: 5 K/UL (ref 1.7–7.7)
NEUTROPHILS RELATIVE PERCENT: 54.7 %
PDW BLD-RTO: 15.5 % (ref 12.4–15.4)
PLATELET # BLD: 227 K/UL (ref 135–450)
PMV BLD AUTO: 7.3 FL (ref 5–10.5)
RBC # BLD: 4.52 M/UL (ref 4.2–5.9)
WBC # BLD: 9.1 K/UL (ref 4–11)

## 2018-10-17 PROCEDURE — 93005 ELECTROCARDIOGRAM TRACING: CPT | Performed by: EMERGENCY MEDICINE

## 2018-10-17 PROCEDURE — 83880 ASSAY OF NATRIURETIC PEPTIDE: CPT

## 2018-10-17 PROCEDURE — 71045 X-RAY EXAM CHEST 1 VIEW: CPT

## 2018-10-17 PROCEDURE — 36415 COLL VENOUS BLD VENIPUNCTURE: CPT

## 2018-10-17 PROCEDURE — 84484 ASSAY OF TROPONIN QUANT: CPT

## 2018-10-17 PROCEDURE — 99285 EMERGENCY DEPT VISIT HI MDM: CPT

## 2018-10-17 PROCEDURE — 80053 COMPREHEN METABOLIC PANEL: CPT

## 2018-10-17 PROCEDURE — 85025 COMPLETE CBC W/AUTO DIFF WBC: CPT

## 2018-10-17 ASSESSMENT — PAIN DESCRIPTION - DESCRIPTORS: DESCRIPTORS: SHARP

## 2018-10-17 ASSESSMENT — PAIN DESCRIPTION - FREQUENCY: FREQUENCY: CONTINUOUS

## 2018-10-17 ASSESSMENT — PAIN DESCRIPTION - PAIN TYPE: TYPE: ACUTE PAIN

## 2018-10-17 ASSESSMENT — PAIN SCALES - GENERAL: PAINLEVEL_OUTOF10: 10

## 2018-10-17 ASSESSMENT — PAIN DESCRIPTION - LOCATION: LOCATION: CHEST

## 2018-10-18 VITALS
SYSTOLIC BLOOD PRESSURE: 108 MMHG | HEART RATE: 79 BPM | DIASTOLIC BLOOD PRESSURE: 88 MMHG | OXYGEN SATURATION: 94 % | HEIGHT: 67 IN | RESPIRATION RATE: 14 BRPM | TEMPERATURE: 97.7 F | WEIGHT: 315 LBS | BODY MASS INDEX: 49.44 KG/M2

## 2018-10-18 LAB
A/G RATIO: 1.2 (ref 1.1–2.2)
ALBUMIN SERPL-MCNC: 4 G/DL (ref 3.4–5)
ALP BLD-CCNC: 74 U/L (ref 40–129)
ALT SERPL-CCNC: 26 U/L (ref 10–40)
ANION GAP SERPL CALCULATED.3IONS-SCNC: 12 MMOL/L (ref 3–16)
AST SERPL-CCNC: 17 U/L (ref 15–37)
BILIRUB SERPL-MCNC: 0.3 MG/DL (ref 0–1)
BUN BLDV-MCNC: 21 MG/DL (ref 7–20)
CALCIUM SERPL-MCNC: 9.9 MG/DL (ref 8.3–10.6)
CHLORIDE BLD-SCNC: 96 MMOL/L (ref 99–110)
CO2: 30 MMOL/L (ref 21–32)
CREAT SERPL-MCNC: 1.2 MG/DL (ref 0.8–1.3)
GFR AFRICAN AMERICAN: >60
GFR NON-AFRICAN AMERICAN: >60
GLOBULIN: 3.4 G/DL
GLUCOSE BLD-MCNC: 318 MG/DL (ref 70–99)
POTASSIUM REFLEX MAGNESIUM: 4.1 MMOL/L (ref 3.5–5.1)
PRO-BNP: 106 PG/ML (ref 0–124)
SODIUM BLD-SCNC: 138 MMOL/L (ref 136–145)
TOTAL PROTEIN: 7.4 G/DL (ref 6.4–8.2)
TROPONIN: <0.01 NG/ML
TROPONIN: <0.01 NG/ML

## 2018-10-18 PROCEDURE — G0378 HOSPITAL OBSERVATION PER HR: HCPCS

## 2018-10-18 PROCEDURE — 36415 COLL VENOUS BLD VENIPUNCTURE: CPT

## 2018-10-18 PROCEDURE — 93010 ELECTROCARDIOGRAM REPORT: CPT | Performed by: INTERNAL MEDICINE

## 2018-10-18 PROCEDURE — 84484 ASSAY OF TROPONIN QUANT: CPT

## 2018-10-18 RX ORDER — SODIUM CHLORIDE 0.9 % (FLUSH) 0.9 %
10 SYRINGE (ML) INJECTION PRN
Status: CANCELLED | OUTPATIENT
Start: 2018-10-18

## 2018-10-18 RX ORDER — BUPROPION HYDROCHLORIDE 150 MG/1
150 TABLET ORAL EVERY MORNING
Status: CANCELLED | OUTPATIENT
Start: 2018-10-18

## 2018-10-18 RX ORDER — METOPROLOL TARTRATE 50 MG/1
50 TABLET, FILM COATED ORAL 2 TIMES DAILY
Status: CANCELLED | OUTPATIENT
Start: 2018-10-18

## 2018-10-18 RX ORDER — MAGNESIUM SULFATE 1 G/100ML
1 INJECTION INTRAVENOUS PRN
Status: CANCELLED | OUTPATIENT
Start: 2018-10-18

## 2018-10-18 RX ORDER — POTASSIUM CHLORIDE 7.45 MG/ML
10 INJECTION INTRAVENOUS PRN
Status: CANCELLED | OUTPATIENT
Start: 2018-10-18

## 2018-10-18 RX ORDER — NICOTINE 21 MG/24HR
1 PATCH, TRANSDERMAL 24 HOURS TRANSDERMAL DAILY
Status: CANCELLED | OUTPATIENT
Start: 2018-10-18

## 2018-10-18 RX ORDER — NICOTINE POLACRILEX 4 MG
15 LOZENGE BUCCAL PRN
Status: CANCELLED | OUTPATIENT
Start: 2018-10-18

## 2018-10-18 RX ORDER — DEXTROSE MONOHYDRATE 25 G/50ML
12.5 INJECTION, SOLUTION INTRAVENOUS PRN
Status: CANCELLED | OUTPATIENT
Start: 2018-10-18

## 2018-10-18 RX ORDER — FUROSEMIDE 40 MG/1
80 TABLET ORAL 2 TIMES DAILY
Status: CANCELLED | OUTPATIENT
Start: 2018-10-18

## 2018-10-18 RX ORDER — POTASSIUM CHLORIDE 20MEQ/15ML
40 LIQUID (ML) ORAL PRN
Status: CANCELLED | OUTPATIENT
Start: 2018-10-18

## 2018-10-18 RX ORDER — ONDANSETRON 2 MG/ML
4 INJECTION INTRAMUSCULAR; INTRAVENOUS EVERY 6 HOURS PRN
Status: CANCELLED | OUTPATIENT
Start: 2018-10-18

## 2018-10-18 RX ORDER — SODIUM CHLORIDE 0.9 % (FLUSH) 0.9 %
10 SYRINGE (ML) INJECTION EVERY 12 HOURS SCHEDULED
Status: CANCELLED | OUTPATIENT
Start: 2018-10-18

## 2018-10-18 RX ORDER — MORPHINE SULFATE 2 MG/ML
2 INJECTION, SOLUTION INTRAMUSCULAR; INTRAVENOUS
Status: CANCELLED | OUTPATIENT
Start: 2018-10-18

## 2018-10-18 RX ORDER — SODIUM CHLORIDE 9 MG/ML
INJECTION, SOLUTION INTRAVENOUS CONTINUOUS
Status: CANCELLED | OUTPATIENT
Start: 2018-10-18

## 2018-10-18 RX ORDER — LATANOPROST 50 UG/ML
1 SOLUTION/ DROPS OPHTHALMIC NIGHTLY
Status: CANCELLED | OUTPATIENT
Start: 2018-10-18

## 2018-10-18 RX ORDER — ALBUTEROL SULFATE 90 UG/1
2 AEROSOL, METERED RESPIRATORY (INHALATION) EVERY 6 HOURS PRN
Status: CANCELLED | OUTPATIENT
Start: 2018-10-18

## 2018-10-18 RX ORDER — ACETAMINOPHEN 325 MG/1
650 TABLET ORAL EVERY 4 HOURS PRN
Status: CANCELLED | OUTPATIENT
Start: 2018-10-18

## 2018-10-18 RX ORDER — DULOXETIN HYDROCHLORIDE 30 MG/1
30 CAPSULE, DELAYED RELEASE ORAL DAILY
Status: CANCELLED | OUTPATIENT
Start: 2018-10-18

## 2018-10-18 RX ORDER — ATORVASTATIN CALCIUM 10 MG/1
20 TABLET, FILM COATED ORAL DAILY
Status: CANCELLED | OUTPATIENT
Start: 2018-10-18

## 2018-10-18 RX ORDER — INSULIN GLARGINE 100 [IU]/ML
30 INJECTION, SOLUTION SUBCUTANEOUS 2 TIMES DAILY
Status: CANCELLED | OUTPATIENT
Start: 2018-10-18

## 2018-10-18 RX ORDER — DEXTROSE MONOHYDRATE 50 MG/ML
100 INJECTION, SOLUTION INTRAVENOUS PRN
Status: CANCELLED | OUTPATIENT
Start: 2018-10-18

## 2018-10-18 RX ORDER — POTASSIUM CHLORIDE 750 MG/1
20 TABLET, EXTENDED RELEASE ORAL 2 TIMES DAILY
Status: CANCELLED | OUTPATIENT
Start: 2018-10-18

## 2018-10-18 RX ORDER — POTASSIUM CHLORIDE 750 MG/1
40 TABLET, EXTENDED RELEASE ORAL PRN
Status: CANCELLED | OUTPATIENT
Start: 2018-10-18

## 2018-10-19 LAB
EKG ATRIAL RATE: 81 BPM
EKG DIAGNOSIS: NORMAL
EKG P AXIS: 72 DEGREES
EKG P-R INTERVAL: 318 MS
EKG Q-T INTERVAL: 398 MS
EKG QRS DURATION: 136 MS
EKG QTC CALCULATION (BAZETT): 462 MS
EKG R AXIS: 22 DEGREES
EKG T AXIS: 39 DEGREES
EKG VENTRICULAR RATE: 81 BPM

## 2018-12-07 ENCOUNTER — APPOINTMENT (OUTPATIENT)
Dept: GENERAL RADIOLOGY | Age: 68
End: 2018-12-07
Payer: MEDICARE

## 2018-12-07 ENCOUNTER — HOSPITAL ENCOUNTER (EMERGENCY)
Age: 68
Discharge: HOME | End: 2018-12-07
Payer: MEDICARE

## 2018-12-07 VITALS
HEART RATE: 79 BPM | RESPIRATION RATE: 20 BRPM | BODY MASS INDEX: 49.44 KG/M2 | HEIGHT: 67 IN | OXYGEN SATURATION: 92 % | TEMPERATURE: 97.8 F | DIASTOLIC BLOOD PRESSURE: 66 MMHG | WEIGHT: 315 LBS | SYSTOLIC BLOOD PRESSURE: 113 MMHG

## 2018-12-07 DIAGNOSIS — J40 COMPLICATED BRONCHITIS: Primary | ICD-10-CM

## 2018-12-07 PROCEDURE — 6370000000 HC RX 637 (ALT 250 FOR IP): Performed by: PHYSICIAN ASSISTANT

## 2018-12-07 PROCEDURE — 71046 X-RAY EXAM CHEST 2 VIEWS: CPT

## 2018-12-07 PROCEDURE — 99284 EMERGENCY DEPT VISIT MOD MDM: CPT

## 2018-12-07 RX ORDER — AMOXICILLIN AND CLAVULANATE POTASSIUM 875; 125 MG/1; MG/1
1 TABLET, FILM COATED ORAL 2 TIMES DAILY
Qty: 20 TABLET | Refills: 0 | Status: SHIPPED | OUTPATIENT
Start: 2018-12-07 | End: 2018-12-17

## 2018-12-07 RX ORDER — BROMPHENIRAMINE MALEATE, PSEUDOEPHEDRINE HYDROCHLORIDE, AND DEXTROMETHORPHAN HYDROBROMIDE 2; 30; 10 MG/5ML; MG/5ML; MG/5ML
10 SYRUP ORAL 4 TIMES DAILY PRN
Qty: 120 ML | Refills: 0 | Status: SHIPPED | OUTPATIENT
Start: 2018-12-07 | End: 2019-11-07 | Stop reason: ALTCHOICE

## 2018-12-07 RX ORDER — IPRATROPIUM BROMIDE AND ALBUTEROL SULFATE 2.5; .5 MG/3ML; MG/3ML
1 SOLUTION RESPIRATORY (INHALATION) ONCE
Status: COMPLETED | OUTPATIENT
Start: 2018-12-07 | End: 2018-12-07

## 2018-12-07 RX ORDER — ALBUTEROL SULFATE 90 UG/1
AEROSOL, METERED RESPIRATORY (INHALATION)
Qty: 1 INHALER | Refills: 1 | Status: SHIPPED | OUTPATIENT
Start: 2018-12-07 | End: 2020-09-21

## 2018-12-07 RX ADMIN — IPRATROPIUM BROMIDE AND ALBUTEROL SULFATE 1 AMPULE: .5; 3 SOLUTION RESPIRATORY (INHALATION) at 14:00

## 2018-12-07 ASSESSMENT — PAIN DESCRIPTION - DESCRIPTORS: DESCRIPTORS: ACHING

## 2018-12-07 ASSESSMENT — PAIN DESCRIPTION - LOCATION: LOCATION: HEAD

## 2018-12-07 ASSESSMENT — PAIN SCALES - GENERAL: PAINLEVEL_OUTOF10: 10

## 2018-12-09 ASSESSMENT — ENCOUNTER SYMPTOMS
DIARRHEA: 0
FACIAL SWELLING: 0
SHORTNESS OF BREATH: 0
NAUSEA: 0
EYE PAIN: 0
EYE REDNESS: 0
SORE THROAT: 0
WHEEZING: 1
RHINORRHEA: 0
CHEST TIGHTNESS: 0
COUGH: 1
BACK PAIN: 0
CONSTIPATION: 0
ABDOMINAL PAIN: 0

## 2018-12-09 NOTE — ED PROVIDER NOTES
he/she understood    He was given a duoneb nebulizer treatment for his symptoms which he did tolerate well with good relief and with improved wheezing on repeat exam.     This patient will be discharged home with the medications listed below. I counseled on how to take these medicines and risks/benefits and AEs. The patient was agreeable to the prescriptions. He/She will follow up with primary care provider or present back for worsening symptoms. I estimate there is LOW risk for PNEUMONIA, MENINGITIS, PERITONSILLAR ABSCESS, SEPSIS, MALIGNANT OTITIS EXTERNA, OR EPIGLOTTITIS thus I consider the discharge disposition reasonable. The patient tolerated their visit well. I evaluated the patient. The physician was available for consultation as needed. The patient and / or the family were informed of the results of anytests, a time was given to answer questions, a plan was proposed and they agreed with plan. CLINICAL IMPRESSION:  1. Complicated bronchitis        DISPOSITION Decision To Discharge 12/07/2018 02:09:55 PM      PATIENT REFERRED TO:  Ronnie Conner MD  [address] [place] [address]  [phone]    Schedule an appointment as soon as possible for a visit in 2 days      [facility] ED  [place] [id number]  Go to   As needed, If symptoms worsen      DISCHARGE MEDICATIONS:  Discharge Medication List as of 12/7/2018  2:11 PM      START taking these medications    Details   amoxicillin-clavulanate (AUGMENTIN) 875-125 MG per tablet Take 1 tablet by mouth 2 times daily for 10 days, Disp-20 tablet, R-0Print      !! albuterol sulfate HFA (PROAIR HFA) 108 (90 Base) MCG/ACT inhaler 1-2 puffs every 4 hours while awake for 7-10 days then PRN wheezing  Dispense with SPACER and Instruct on use.   May sub Ventolin or Proventil as needed per Insurance., Disp-1 Inhaler, R-1Print      brompheniramine-pseudoephedrine-DM (BROMFED DM) 30-2-10

## 2019-01-17 ENCOUNTER — OFFICE VISIT (OUTPATIENT)
Dept: CARDIOLOGY CLINIC | Age: 69
End: 2019-01-17
Payer: MEDICARE

## 2019-01-17 VITALS
WEIGHT: 315 LBS | BODY MASS INDEX: 49.44 KG/M2 | SYSTOLIC BLOOD PRESSURE: 112 MMHG | OXYGEN SATURATION: 95 % | HEIGHT: 67 IN | HEART RATE: 71 BPM | DIASTOLIC BLOOD PRESSURE: 60 MMHG

## 2019-01-17 DIAGNOSIS — I48.0 PAROXYSMAL A-FIB (HCC): Chronic | ICD-10-CM

## 2019-01-17 DIAGNOSIS — I25.110 CORONARY ARTERY DISEASE INVOLVING NATIVE CORONARY ARTERY OF NATIVE HEART WITH UNSTABLE ANGINA PECTORIS (HCC): Primary | Chronic | ICD-10-CM

## 2019-01-17 DIAGNOSIS — I48.3 TYPICAL ATRIAL FLUTTER (HCC): ICD-10-CM

## 2019-01-17 DIAGNOSIS — I10 ESSENTIAL HYPERTENSION: ICD-10-CM

## 2019-01-17 DIAGNOSIS — E78.2 MIXED HYPERLIPIDEMIA: Chronic | ICD-10-CM

## 2019-01-17 PROCEDURE — 99214 OFFICE O/P EST MOD 30 MIN: CPT | Performed by: INTERNAL MEDICINE

## 2019-10-16 ENCOUNTER — HOSPITAL ENCOUNTER (OUTPATIENT)
Age: 69
Discharge: HOME | End: 2019-10-16
Payer: MEDICARE

## 2019-10-16 LAB
A/G RATIO: 1.2 (ref 1.1–2.2)
ALBUMIN SERPL-MCNC: 4 G/DL (ref 3.4–5)
ALP BLD-CCNC: 62 U/L (ref 40–129)
ALT SERPL-CCNC: 16 U/L (ref 10–40)
ANION GAP SERPL CALCULATED.3IONS-SCNC: 12 MMOL/L (ref 3–16)
AST SERPL-CCNC: 15 U/L (ref 15–37)
BASOPHILS ABSOLUTE: 0.2 K/UL (ref 0–0.2)
BASOPHILS RELATIVE PERCENT: 2.6 %
BILIRUB SERPL-MCNC: 0.5 MG/DL (ref 0–1)
BUN BLDV-MCNC: 15 MG/DL (ref 7–20)
CALCIUM SERPL-MCNC: 9.3 MG/DL (ref 8.3–10.6)
CHLORIDE BLD-SCNC: 102 MMOL/L (ref 99–110)
CHOLESTEROL, FASTING: 106 MG/DL (ref 0–199)
CO2: 28 MMOL/L (ref 21–32)
CREAT SERPL-MCNC: 1 MG/DL (ref 0.8–1.3)
EOSINOPHILS ABSOLUTE: 0.5 K/UL (ref 0–0.6)
EOSINOPHILS RELATIVE PERCENT: 5.8 %
GFR AFRICAN AMERICAN: >60
GFR NON-AFRICAN AMERICAN: >60
GLOBULIN: 3.3 G/DL
GLUCOSE FASTING: 140 MG/DL (ref 70–99)
HCT VFR BLD CALC: 39.9 % (ref 40.5–52.5)
HDLC SERPL-MCNC: 43 MG/DL (ref 40–60)
HEMOGLOBIN: 12.6 G/DL (ref 13.5–17.5)
LDL CHOLESTEROL CALCULATED: 45 MG/DL
LYMPHOCYTES ABSOLUTE: 2.1 K/UL (ref 1–5.1)
LYMPHOCYTES RELATIVE PERCENT: 26.8 %
MCH RBC QN AUTO: 27.8 PG (ref 26–34)
MCHC RBC AUTO-ENTMCNC: 31.7 G/DL (ref 31–36)
MCV RBC AUTO: 88 FL (ref 80–100)
MONOCYTES ABSOLUTE: 0.6 K/UL (ref 0–1.3)
MONOCYTES RELATIVE PERCENT: 7.4 %
NEUTROPHILS ABSOLUTE: 4.6 K/UL (ref 1.7–7.7)
NEUTROPHILS RELATIVE PERCENT: 57.4 %
PDW BLD-RTO: 15.5 % (ref 12.4–15.4)
PLATELET # BLD: 213 K/UL (ref 135–450)
PMV BLD AUTO: 7.7 FL (ref 5–10.5)
POTASSIUM SERPL-SCNC: 4.5 MMOL/L (ref 3.5–5.1)
RBC # BLD: 4.53 M/UL (ref 4.2–5.9)
SODIUM BLD-SCNC: 142 MMOL/L (ref 136–145)
TOTAL PROTEIN: 7.3 G/DL (ref 6.4–8.2)
TRIGLYCERIDE, FASTING: 88 MG/DL (ref 0–150)
VLDLC SERPL CALC-MCNC: 18 MG/DL
WBC # BLD: 8 K/UL (ref 4–11)

## 2019-10-16 PROCEDURE — 80061 LIPID PANEL: CPT

## 2019-10-16 PROCEDURE — 80053 COMPREHEN METABOLIC PANEL: CPT

## 2019-10-16 PROCEDURE — 36415 COLL VENOUS BLD VENIPUNCTURE: CPT

## 2019-10-16 PROCEDURE — 85025 COMPLETE CBC W/AUTO DIFF WBC: CPT

## 2019-11-05 ENCOUNTER — HOSPITAL ENCOUNTER (EMERGENCY)
Age: 69
Discharge: HOME | End: 2019-11-05
Attending: EMERGENCY MEDICINE
Payer: MEDICARE

## 2019-11-05 ENCOUNTER — APPOINTMENT (OUTPATIENT)
Dept: CT IMAGING | Age: 69
End: 2019-11-05
Payer: MEDICARE

## 2019-11-05 ENCOUNTER — APPOINTMENT (OUTPATIENT)
Dept: GENERAL RADIOLOGY | Age: 69
End: 2019-11-05
Payer: MEDICARE

## 2019-11-05 VITALS
BODY MASS INDEX: 49.44 KG/M2 | TEMPERATURE: 97.9 F | SYSTOLIC BLOOD PRESSURE: 118 MMHG | HEART RATE: 75 BPM | WEIGHT: 315 LBS | HEIGHT: 67 IN | OXYGEN SATURATION: 96 % | RESPIRATION RATE: 18 BRPM | DIASTOLIC BLOOD PRESSURE: 63 MMHG

## 2019-11-05 DIAGNOSIS — R10.9 ACUTE RIGHT FLANK PAIN: Primary | ICD-10-CM

## 2019-11-05 LAB
A/G RATIO: 1.2 (ref 1.1–2.2)
ALBUMIN SERPL-MCNC: 4.2 G/DL (ref 3.4–5)
ALP BLD-CCNC: 62 U/L (ref 40–129)
ALT SERPL-CCNC: 15 U/L (ref 10–40)
AMORPHOUS: ABNORMAL /HPF
ANION GAP SERPL CALCULATED.3IONS-SCNC: 9 MMOL/L (ref 3–16)
AST SERPL-CCNC: 12 U/L (ref 15–37)
BASOPHILS ABSOLUTE: 0.1 K/UL (ref 0–0.2)
BASOPHILS RELATIVE PERCENT: 1.4 %
BILIRUB SERPL-MCNC: 0.4 MG/DL (ref 0–1)
BILIRUBIN URINE: NEGATIVE
BLOOD, URINE: NEGATIVE
BUN BLDV-MCNC: 15 MG/DL (ref 7–20)
CALCIUM SERPL-MCNC: 9.6 MG/DL (ref 8.3–10.6)
CHLORIDE BLD-SCNC: 98 MMOL/L (ref 99–110)
CLARITY: CLEAR
CO2: 28 MMOL/L (ref 21–32)
COLOR: YELLOW
CREAT SERPL-MCNC: 1 MG/DL (ref 0.8–1.3)
EOSINOPHILS ABSOLUTE: 0.2 K/UL (ref 0–0.6)
EOSINOPHILS RELATIVE PERCENT: 2.7 %
EPITHELIAL CELLS, UA: ABNORMAL /HPF
GFR AFRICAN AMERICAN: >60
GFR NON-AFRICAN AMERICAN: >60
GLOBULIN: 3.5 G/DL
GLUCOSE BLD-MCNC: 138 MG/DL (ref 70–99)
GLUCOSE URINE: NEGATIVE MG/DL
HCT VFR BLD CALC: 39 % (ref 40.5–52.5)
HEMOGLOBIN: 12.7 G/DL (ref 13.5–17.5)
KETONES, URINE: NEGATIVE MG/DL
LEUKOCYTE ESTERASE, URINE: NEGATIVE
LYMPHOCYTES ABSOLUTE: 1.5 K/UL (ref 1–5.1)
LYMPHOCYTES RELATIVE PERCENT: 21.9 %
MCH RBC QN AUTO: 28 PG (ref 26–34)
MCHC RBC AUTO-ENTMCNC: 32.5 G/DL (ref 31–36)
MCV RBC AUTO: 86.2 FL (ref 80–100)
MICROSCOPIC EXAMINATION: YES
MONOCYTES ABSOLUTE: 0.5 K/UL (ref 0–1.3)
MONOCYTES RELATIVE PERCENT: 7.8 %
MUCUS: ABNORMAL /LPF
NEUTROPHILS ABSOLUTE: 4.6 K/UL (ref 1.7–7.7)
NEUTROPHILS RELATIVE PERCENT: 66.2 %
NITRITE, URINE: NEGATIVE
PDW BLD-RTO: 15.6 % (ref 12.4–15.4)
PH UA: 7 (ref 5–8)
PLATELET # BLD: 241 K/UL (ref 135–450)
PMV BLD AUTO: 7.2 FL (ref 5–10.5)
POTASSIUM REFLEX MAGNESIUM: 4.3 MMOL/L (ref 3.5–5.1)
PROTEIN UA: ABNORMAL MG/DL
RBC # BLD: 4.53 M/UL (ref 4.2–5.9)
RBC UA: ABNORMAL /HPF (ref 0–2)
SODIUM BLD-SCNC: 135 MMOL/L (ref 136–145)
SPECIFIC GRAVITY UA: 1.01 (ref 1–1.03)
TOTAL PROTEIN: 7.7 G/DL (ref 6.4–8.2)
URINE REFLEX TO CULTURE: ABNORMAL
URINE TYPE: ABNORMAL
UROBILINOGEN, URINE: 1 E.U./DL
WBC # BLD: 6.9 K/UL (ref 4–11)
WBC UA: ABNORMAL /HPF (ref 0–5)

## 2019-11-05 PROCEDURE — 80053 COMPREHEN METABOLIC PANEL: CPT

## 2019-11-05 PROCEDURE — 96375 TX/PRO/DX INJ NEW DRUG ADDON: CPT

## 2019-11-05 PROCEDURE — 6360000002 HC RX W HCPCS: Performed by: EMERGENCY MEDICINE

## 2019-11-05 PROCEDURE — 74018 RADEX ABDOMEN 1 VIEW: CPT

## 2019-11-05 PROCEDURE — 99283 EMERGENCY DEPT VISIT LOW MDM: CPT

## 2019-11-05 PROCEDURE — 2580000003 HC RX 258: Performed by: EMERGENCY MEDICINE

## 2019-11-05 PROCEDURE — 81001 URINALYSIS AUTO W/SCOPE: CPT

## 2019-11-05 PROCEDURE — 85025 COMPLETE CBC W/AUTO DIFF WBC: CPT

## 2019-11-05 PROCEDURE — 96361 HYDRATE IV INFUSION ADD-ON: CPT

## 2019-11-05 PROCEDURE — 96374 THER/PROPH/DIAG INJ IV PUSH: CPT

## 2019-11-05 RX ORDER — METHOCARBAMOL 750 MG/1
750 TABLET, FILM COATED ORAL 4 TIMES DAILY
Qty: 40 TABLET | Refills: 0 | Status: SHIPPED | OUTPATIENT
Start: 2019-11-05 | End: 2019-11-15

## 2019-11-05 RX ORDER — ONDANSETRON 2 MG/ML
4 INJECTION INTRAMUSCULAR; INTRAVENOUS ONCE
Status: COMPLETED | OUTPATIENT
Start: 2019-11-05 | End: 2019-11-05

## 2019-11-05 RX ORDER — KETOROLAC TROMETHAMINE 30 MG/ML
15 INJECTION, SOLUTION INTRAMUSCULAR; INTRAVENOUS ONCE
Status: COMPLETED | OUTPATIENT
Start: 2019-11-05 | End: 2019-11-05

## 2019-11-05 RX ORDER — 0.9 % SODIUM CHLORIDE 0.9 %
1000 INTRAVENOUS SOLUTION INTRAVENOUS ONCE
Status: COMPLETED | OUTPATIENT
Start: 2019-11-05 | End: 2019-11-05

## 2019-11-05 RX ORDER — NAPROXEN 500 MG/1
500 TABLET ORAL 2 TIMES DAILY
Qty: 20 TABLET | Refills: 0 | Status: SHIPPED | OUTPATIENT
Start: 2019-11-05 | End: 2022-06-23

## 2019-11-05 RX ADMIN — KETOROLAC TROMETHAMINE 15 MG: 30 INJECTION, SOLUTION INTRAMUSCULAR; INTRAVENOUS at 18:19

## 2019-11-05 RX ADMIN — ONDANSETRON HYDROCHLORIDE 4 MG: 2 INJECTION, SOLUTION INTRAMUSCULAR; INTRAVENOUS at 18:20

## 2019-11-05 RX ADMIN — SODIUM CHLORIDE 1000 ML: 9 INJECTION, SOLUTION INTRAVENOUS at 18:20

## 2019-11-05 ASSESSMENT — PAIN DESCRIPTION - LOCATION: LOCATION: BACK

## 2019-11-05 ASSESSMENT — PAIN DESCRIPTION - ORIENTATION: ORIENTATION: RIGHT

## 2019-11-05 ASSESSMENT — PAIN SCALES - GENERAL
PAINLEVEL_OUTOF10: 10
PAINLEVEL_OUTOF10: 2
PAINLEVEL_OUTOF10: 10

## 2019-11-05 ASSESSMENT — PAIN DESCRIPTION - FREQUENCY: FREQUENCY: CONTINUOUS

## 2019-11-05 ASSESSMENT — PAIN DESCRIPTION - PAIN TYPE: TYPE: ACUTE PAIN

## 2019-11-05 ASSESSMENT — PAIN DESCRIPTION - DESCRIPTORS: DESCRIPTORS: ACHING

## 2019-11-07 ENCOUNTER — OFFICE VISIT (OUTPATIENT)
Dept: CARDIOLOGY CLINIC | Age: 69
End: 2019-11-07
Payer: MEDICARE

## 2019-11-07 VITALS
SYSTOLIC BLOOD PRESSURE: 118 MMHG | DIASTOLIC BLOOD PRESSURE: 70 MMHG | HEART RATE: 66 BPM | HEIGHT: 67 IN | OXYGEN SATURATION: 98 % | WEIGHT: 315 LBS | BODY MASS INDEX: 49.44 KG/M2

## 2019-11-07 DIAGNOSIS — I48.0 PAROXYSMAL A-FIB (HCC): Chronic | ICD-10-CM

## 2019-11-07 DIAGNOSIS — E66.01 MORBIDLY OBESE (HCC): ICD-10-CM

## 2019-11-07 DIAGNOSIS — E78.2 MIXED HYPERLIPIDEMIA: Chronic | ICD-10-CM

## 2019-11-07 DIAGNOSIS — I25.110 CORONARY ARTERY DISEASE INVOLVING NATIVE CORONARY ARTERY OF NATIVE HEART WITH UNSTABLE ANGINA PECTORIS (HCC): Primary | Chronic | ICD-10-CM

## 2019-11-07 DIAGNOSIS — I50.32 CHRONIC DIASTOLIC CHF (CONGESTIVE HEART FAILURE) (HCC): Chronic | ICD-10-CM

## 2019-11-07 PROCEDURE — 99214 OFFICE O/P EST MOD 30 MIN: CPT | Performed by: INTERNAL MEDICINE

## 2019-11-27 ENCOUNTER — TELEPHONE (OUTPATIENT)
Dept: CARDIOLOGY CLINIC | Age: 69
End: 2019-11-27

## 2019-12-18 ENCOUNTER — TELEPHONE (OUTPATIENT)
Dept: BARIATRICS/WEIGHT MGMT | Age: 69
End: 2019-12-18

## 2020-01-21 ENCOUNTER — TELEPHONE (OUTPATIENT)
Dept: BARIATRICS/WEIGHT MGMT | Age: 70
End: 2020-01-21

## 2020-03-25 PROBLEM — D64.9 ANEMIA: Status: RESOLVED | Noted: 2017-05-16 | Resolved: 2020-03-24

## 2020-09-17 NOTE — PROGRESS NOTES
[address]   Cardiac Consultation    Referring Provider:  Nitesh Driscoll MD     Chief Complaint   Patient presents with    1 Year Follow Up     Cardiac follow up for CAD    Other     Shortness of breath ( nothing more than normal)      Subjective: Vani Collazo presents today for chronic dCHF, aflutter, AFIB, s/p CABG 2V; no complaints today     Past Medical History:   Mr. Alia Escobar is a 70yo male with PMH of morbid obesity, CAD s/p LAD DELFINO March 2015 and 2V CABG May 2017, DM, JAKE on CPAP, history of PE, colon cancer s/p surgery, and arthritis. Admitted to [facility] 4/25/2017 with acute SOB and r/o'd for MI but ischemic EKG changes. Underwent LHC by Dr. Buster Benson 4/27/17 showing MV CAD with high grade LM disease extending into circumflex and LAD and high grade mid LAD disease distal to a previously placed patent stent. Note 50% diffuse mid-RCA stenoses, 70% LM, 90% ostial L. CCx, 90% ostial OM#1 and 95% mid-LAD after stent. Underwent s/p 2V CABG 5/2/17 by Dr. Taisha Alicia. He developed A fib/Aflutter post-op which resolved. He went to rehab following d/cand developed MRSA in his right leg where vein was harvested for surgery and underwent IV abx, wound vac, dressing changes. Note most recent ECHO 5/16/17 showed EF=55-60 %. Note EKG 8/24/17 showed NSR, 1st degree AV block, RBBB. Note 2 week event monitor 8/29-9/14/17 showed NSR avg HR 63 bpm, no major issues and d/c'd eliquis. Most recent EKG 10/17/18 SR, 1st degree AV block, RBBB; no change from 8/2017. History of Present Illness: Today, he feels tired all the time. He reports he is seeing Dr Jefferson King for pulmonology who ordered him a new CPAP machine. He reports sleeping better. Denies chest pain, shortness of breath, dizziness, palpitations and syncope. He wears JIMBO hose daily. Wife is present at visit   She reports his recliner broke and he has swelling in top of his feet. He uses a motorized scooter to get around.   His weight is down 10 lbs since Nov 2019 (weight today 462#). I referred him to [address] [place] [address] weight loss clinic but insurance will not cover bariatric surgery. Note he did not take lasix today due to coming to . Past Medical History:   has a past medical history of Arthritis, Asthma, BiPAP (biphasic positive airway pressure) dependence, CHF (congestive heart failure) ([address] Colon cancer ([facility] [address] CPAP (continuous positive airway pressure) dependence, Depression, Diabetes mellitus ([address] MRSA (methicillin resistant staph aureus) culture positive, PE (pulmonary embolism), Sleep apnea, and Vitamin D deficiency. Surgical History:   has a past surgical history that includes Knee Arthroplasty (Left); Colon surgery; hernia repair; Coronary angioplasty with stent (03/04/2015); Total shoulder arthroplasty (Right); Cardiac catheterization (04/27/2017); and Coronary artery bypass graft (05/02/2017). Social History:   reports that he quit smoking about 6 years ago. His smoking use included cigars. He has a 5.00 pack-year smoking history. He has never used smokeless tobacco. He reports current alcohol use. He reports current drug use. Drug: Marijuana. Family History:  family history includes Heart Disease in his brother and mother. Home Medications:  Prior to Admission medications    Medication Sig Start Date End Date Taking? Authorizing Provider   budesonide-formoterol (SYMBICORT) 80-4.5 MCG/ACT AERO Inhale 2 puffs into the lungs 2 times daily   Yes Historical Provider, MD   Semaglutide (OZEMPIC, 1 MG/DOSE, SC) Inject 1 mg into the skin once a week   Yes Historical Provider, MD   insulin aspart (NOVOLOG) 100 UNIT/ML injection vial Inject 50 Units into the skin 2 times daily   Yes Historical Provider, MD   insulin 70-30 (NOVOLIN 70/30) (70-30) 100 UNIT per ML injection vial Inject 22 Units into the skin nightly   Yes Historical Provider, MD   Misc.  Devices (NOVA BATH SEAT) MISC by Does not apply route   Yes Historical Provider, MD   timolol (BETIMOL) 0.5 % ophthalmic solution 1 drop 2 times daily   Yes Historical Provider, MD   latanoprost (XALATAN) 0.005 % ophthalmic solution 1 drop nightly   Yes Historical Provider, MD   aspirin 325 MG EC tablet Take 1 tablet by mouth daily 9/20/17  Yes Emir Stevens MD   potassium chloride (KLOR-CON M) 20 MEQ extended release tablet Take 1 tablet by mouth 2 times daily 8/24/17  Yes Satnam Dominguez MD   atorvastatin (LIPITOR) 20 MG tablet Take 20 mg by mouth daily   Yes Historical Provider, MD   furosemide (LASIX) 80 MG tablet Take 1 tablet by mouth 2 times daily 5/23/17  Yes Ericka Leonard MD   metoprolol tartrate (LOPRESSOR) 50 MG tablet Take 1 tablet by mouth 2 times daily 5/9/17  Yes MIROSLAVA Rubio CNP   metFORMIN (GLUCOPHAGE) 1000 MG tablet Take 1,000 mg by mouth 2 times daily (with meals) 4/2/17  Yes Historical Provider, MD   naproxen (NAPROSYN) 500 MG tablet Take 1 tablet by mouth 2 times daily for 20 doses 11/5/19 11/15/19  MIROSLAVA Amezcua CNP      Allergies:  Stadol [butorphanol tartrate]     Review of Systems:   · Constitutional: there has been no unanticipated weight loss. There's been no change in energy level, sleep pattern, or activity level. · Eyes: No visual changes or diplopia. No scleral icterus. · ENT: No Headaches, hearing loss or vertigo. No mouth sores or sore throat. · Cardiovascular: Reviewed in HPI  · Respiratory: No cough or wheezing, no sputum production. No hematemesis. · Gastrointestinal: No abdominal pain, appetite loss, blood in stools. No change in bowel or bladder habits. · Genitourinary: No dysuria, trouble voiding, or hematuria. · Musculoskeletal:  No gait disturbance, weakness or joint complaints. · Integumentary: No rash or pruritis. · Neurological: No headache, diplopia, change in muscle strength, numbness or tingling. No change in gait, balance, coordination, mood, affect, memory, mentation, behavior.   · Psychiatric: No anxiety, no depression. · Endocrine: No malaise, fatigue or temperature intolerance. No excessive thirst, fluid intake, or urination. No tremor. · Hematologic/Lymphatic: No abnormal bruising or bleeding, blood clots or swollen lymph nodes. · Allergic/Immunologic: No nasal congestion or hives. Physical Examination:    Vitals:    09/21/20 1530   BP: 130/72   Pulse: 70   Temp: 96.9 °F (36.1 °C)   SpO2: 96%     Wt Readings from Last 3 Encounters:   09/21/20 (!) 462 lb (209.6 kg)   11/07/19 (!) 472 lb (214.1 kg)   11/05/19 (!) 470 lb (213.2 kg)          Constitutional and General Appearance: NAD; morbidly obese  Respiratory:  · Normal excursion and expansion without use of accessory muscles  · Resp Auscultation: Normal breath sounds without dullness  Cardiovascular:  · The apical impulses not displaced  · Heart tones are crisp and normal  · Cervical veins are not engorged  · The carotid upstroke is normal in amplitude and contour without delay or bruit  · Soft S1S2, No S3, No Murmur  · Peripheral pulses are symmetrical and full  · There is no clubbing, cyanosis of the extremities. · 2+  BLE edema JIMBO hose in place  · Femoral Arteries: 2+ and equal  · Pedal Pulses: 2+ and equal   Abdomen:  · Morbidly obese, No masses or tenderness  · Liver/Spleen: No Abnormalities Noted  Neurological/Psychiatric:  · Alert and oriented in all spheres  · Moves all extremities well  · Exhibits normal gait balance and coordination  · No abnormalities of mood, affect, memory, mentation, or behavior are noted  Skin:  · Skin: warm and dry.     Lab Results   Component Value Date    CHOL 128 10/26/2017    CHOL 123 04/26/2017    CHOL 177 04/06/2011     Lab Results   Component Value Date    TRIG 101 10/26/2017    TRIG 160 (H) 04/26/2017    TRIG 168 04/06/2011     Lab Results   Component Value Date    HDL 43 10/16/2019    HDL 70 10/26/2017    HDL 39 (L) 04/26/2017     Lab Results   Component Value Date    LDLCALC 45 10/16/2019    LDLCALC 52 04/26/2017 Zander Caldwell MD by Ronnie Acosta RN    I, Dr. Hattie Holland, personally performed the services described in this documentation, as scribed by the above signed scribe in my presence. It is both accurate and complete to my knowledge. I agree with the details independently gathered by the clinical support staff, while the remaining scribed note accurately describes my personal service to the patient. Antoine Koehler.  Zander Caldwell M.D., [facility]

## 2020-09-21 ENCOUNTER — OFFICE VISIT (OUTPATIENT)
Dept: CARDIOLOGY CLINIC | Age: 70
End: 2020-09-21
Payer: MEDICARE

## 2020-09-21 VITALS
DIASTOLIC BLOOD PRESSURE: 72 MMHG | TEMPERATURE: 96.9 F | OXYGEN SATURATION: 96 % | WEIGHT: 315 LBS | HEIGHT: 67 IN | BODY MASS INDEX: 49.44 KG/M2 | SYSTOLIC BLOOD PRESSURE: 130 MMHG | HEART RATE: 70 BPM

## 2020-09-21 PROCEDURE — 99214 OFFICE O/P EST MOD 30 MIN: CPT | Performed by: INTERNAL MEDICINE

## 2020-09-21 RX ORDER — BUDESONIDE AND FORMOTEROL FUMARATE DIHYDRATE 80; 4.5 UG/1; UG/1
2 AEROSOL RESPIRATORY (INHALATION) 2 TIMES DAILY
COMMUNITY

## 2020-10-07 ENCOUNTER — HOSPITAL ENCOUNTER (OUTPATIENT)
Age: 70
Discharge: HOME | End: 2020-10-07
Payer: MEDICARE

## 2020-10-07 LAB
A/G RATIO: 1.2 (ref 1.1–2.2)
ALBUMIN SERPL-MCNC: 3.8 G/DL (ref 3.4–5)
ALP BLD-CCNC: 70 U/L (ref 40–129)
ALT SERPL-CCNC: 27 U/L (ref 10–40)
ANION GAP SERPL CALCULATED.3IONS-SCNC: 14 MMOL/L (ref 3–16)
AST SERPL-CCNC: 20 U/L (ref 15–37)
BASOPHILS ABSOLUTE: 0.1 K/UL (ref 0–0.2)
BASOPHILS RELATIVE PERCENT: 1.4 %
BILIRUB SERPL-MCNC: 0.3 MG/DL (ref 0–1)
BUN BLDV-MCNC: 20 MG/DL (ref 7–20)
CALCIUM SERPL-MCNC: 9.5 MG/DL (ref 8.3–10.6)
CHLORIDE BLD-SCNC: 102 MMOL/L (ref 99–110)
CHOLESTEROL, FASTING: 110 MG/DL (ref 0–199)
CO2: 23 MMOL/L (ref 21–32)
CREAT SERPL-MCNC: 0.9 MG/DL (ref 0.8–1.3)
EOSINOPHILS ABSOLUTE: 0.4 K/UL (ref 0–0.6)
EOSINOPHILS RELATIVE PERCENT: 5.6 %
GFR AFRICAN AMERICAN: >60
GFR NON-AFRICAN AMERICAN: >60
GLOBULIN: 3.2 G/DL
GLUCOSE BLD-MCNC: 230 MG/DL (ref 70–99)
HCT VFR BLD CALC: 39.4 % (ref 40.5–52.5)
HDLC SERPL-MCNC: 41 MG/DL (ref 40–60)
HEMOGLOBIN: 12.6 G/DL (ref 13.5–17.5)
LDL CHOLESTEROL CALCULATED: 45 MG/DL
LYMPHOCYTES ABSOLUTE: 2.1 K/UL (ref 1–5.1)
LYMPHOCYTES RELATIVE PERCENT: 27.5 %
MCH RBC QN AUTO: 27.9 PG (ref 26–34)
MCHC RBC AUTO-ENTMCNC: 32 G/DL (ref 31–36)
MCV RBC AUTO: 87.3 FL (ref 80–100)
MONOCYTES ABSOLUTE: 0.7 K/UL (ref 0–1.3)
MONOCYTES RELATIVE PERCENT: 8.6 %
NEUTROPHILS ABSOLUTE: 4.4 K/UL (ref 1.7–7.7)
NEUTROPHILS RELATIVE PERCENT: 56.9 %
PDW BLD-RTO: 15.3 % (ref 12.4–15.4)
PLATELET # BLD: 218 K/UL (ref 135–450)
PMV BLD AUTO: 7.2 FL (ref 5–10.5)
POTASSIUM SERPL-SCNC: 4.4 MMOL/L (ref 3.5–5.1)
RBC # BLD: 4.51 M/UL (ref 4.2–5.9)
SODIUM BLD-SCNC: 139 MMOL/L (ref 136–145)
TOTAL PROTEIN: 7 G/DL (ref 6.4–8.2)
TRIGLYCERIDE, FASTING: 120 MG/DL (ref 0–150)
VLDLC SERPL CALC-MCNC: 24 MG/DL
WBC # BLD: 7.7 K/UL (ref 4–11)

## 2020-10-07 PROCEDURE — 36415 COLL VENOUS BLD VENIPUNCTURE: CPT

## 2020-10-07 PROCEDURE — 80053 COMPREHEN METABOLIC PANEL: CPT

## 2020-10-07 PROCEDURE — 80061 LIPID PANEL: CPT

## 2020-10-07 PROCEDURE — 85025 COMPLETE CBC W/AUTO DIFF WBC: CPT

## 2021-06-21 NOTE — PROGRESS NOTES
2 times daily   Yes Historical Provider, MD   aspirin 325 MG EC tablet Take 1 tablet by mouth daily 9/20/17  Yes Toñito Cody MD   potassium chloride (KLOR-CON M) 20 MEQ extended release tablet Take 1 tablet by mouth 2 times daily  Patient taking differently: Take 20 mEq by mouth daily  8/24/17  Yes Jonathon Russell MD   atorvastatin (LIPITOR) 20 MG tablet Take 20 mg by mouth daily   Yes Historical Provider, MD   furosemide (LASIX) 80 MG tablet Take 1 tablet by mouth 2 times daily  Patient taking differently: Take 80 mg by mouth daily  5/23/17  Yes Rupali Blevins MD   metoprolol tartrate (LOPRESSOR) 50 MG tablet Take 1 tablet by mouth 2 times daily 5/9/17  Yes MIROSLAVA Rubio CNP   metFORMIN (GLUCOPHAGE) 1000 MG tablet Take 1,000 mg by mouth 2 times daily (with meals) 4/2/17  Yes Historical Provider, MD   latanoprost (XALATAN) 0.005 % ophthalmic solution 1 drop nightly  Patient not taking: Reported on 6/23/2021    Historical Provider, MD      Allergies:  Stadol [butorphanol tartrate]     Review of Systems:   · Constitutional: there has been no unanticipated weight loss. There's been no change in energy level, sleep pattern, or activity level. · Eyes: No visual changes or diplopia. No scleral icterus. · ENT: No Headaches, hearing loss or vertigo. No mouth sores or sore throat. · Cardiovascular: Reviewed in HPI  · Respiratory: No cough or wheezing, no sputum production. No hematemesis. · Gastrointestinal: No abdominal pain, appetite loss, blood in stools. No change in bowel or bladder habits. · Genitourinary: No dysuria, trouble voiding, or hematuria. · Musculoskeletal:  No gait disturbance, weakness or joint complaints. · Integumentary: No rash or pruritis. · Neurological: No headache, diplopia, change in muscle strength, numbness or tingling. No change in gait, balance, coordination, mood, affect, memory, mentation, behavior. · Psychiatric: No anxiety, no depression.   · Endocrine: No malaise, fatigue or temperature intolerance. No excessive thirst, fluid intake, or urination. No tremor. · Hematologic/Lymphatic: No abnormal bruising or bleeding, blood clots or swollen lymph nodes. · Allergic/Immunologic: No nasal congestion or hives. Physical Examination:    Vitals:    06/23/21 1332   BP: 138/68   Pulse: 62   Temp: 97.7 °F (36.5 °C)   SpO2: 96%     Wt Readings from Last 3 Encounters:   06/23/21 (!) 460 lb 12.8 oz (209 kg)   09/21/20 (!) 462 lb (209.6 kg)   11/07/19 (!) 472 lb (214.1 kg)          Constitutional and General Appearance: NAD; morbidly obese  Respiratory:  · Normal excursion and expansion without use of accessory muscles  · Resp Auscultation: Normal breath sounds without dullness. Clear, no crackles or wheezes. Cardiovascular:  · The apical impulses not displaced  · Heart tones are crisp and normal  · Cervical veins are not engorged  · The carotid upstroke is normal in amplitude and contour without delay or bruit  · Soft S1S2, No S3, No Murmur. RRR. · Peripheral pulses are symmetrical and full  · There is no clubbing, cyanosis of the extremities. · 1+  BLE edema   · Femoral Arteries: 2+ and equal  · Pedal Pulses: 2+ and equal   Abdomen:  · Morbidly obese, No masses or tenderness  · Liver/Spleen: No Abnormalities Noted  Neurological/Psychiatric:  · Alert and oriented in all spheres  · Moves all extremities well  · Exhibits normal gait balance and coordination  · No abnormalities of mood, affect, memory, mentation, or behavior are noted  Skin:  · Skin: warm and dry.     Lab Results   Component Value Date    CHOL 128 10/26/2017    CHOL 123 04/26/2017    CHOL 177 04/06/2011     Lab Results   Component Value Date    TRIG 101 10/26/2017    TRIG 160 (H) 04/26/2017    TRIG 168 04/06/2011     Lab Results   Component Value Date    HDL 41 10/07/2020    HDL 43 10/16/2019    HDL 70 10/26/2017     Lab Results   Component Value Date    LDLCALC 45 10/07/2020    LDLCALC 45 10/16/2019 LDLCALC 52 04/26/2017     Lab Results   Component Value Date    LABVLDL 24 10/07/2020    LABVLDL 18 10/16/2019    LABVLDL 32 04/26/2017     Lab Results   Component Value Date    CHOLHDLRATIO 3.4 10/26/2017     Labs 6/23/20 (TC) BUN 19 Cr 1.39 K+ 4.5    Assessment:     1. Typical atrial flutter ([address]):  PAF post-op resolved and no evidence for further atrial arrhythmia. Note 2 week event monitor 8/29-9/14/17 showed NSR avg HR 63 bpm, no major issues. Continue adult aspirin only. 2. CAD s/p CABGx2 (April 2017) & stents (2015):  He is s/p LAD DELFINO March 2015. He underwent LHC by Dr. Goodman Pi 4/27/17 showing multivessel CAD. He subsequently underwent s/p 2V CABG 5/2/17 by Dr. Channing Carrero. There are no concerning symptoms for angina currently. 3. Paroxysmal a-fib ([address]):  See #1 above. Resolved. 4. Essential hypertension: Well controlled and will continue current medical regimen. Note lisinopril stopped earlier due to hypotension. NO need for ACE-I due to normal LVEF.     5       Hyperlipidemia:  Last lipids 10/07//20 see above results I personally reviewed. Well controlled and at goal and will continue current medical regimen. Cost of prescription medications and patient compliance have been reviewed with patient. All questions answered. Plan:  1. Limit fluid to less than 64 ounces a day  2. Limit salt intake  3. Can take an extra lasix as needed for edema/extra fluid. 4. LABS: FLP, CMP, and CBC  5. Follow up in 9 months  6. Medications reviewed. He gets refills from PCP. 7. EKG today shows NSR with prolonged 1st degree AV block vs junctional rhythm; RBBB; LV; nonspecific ST change (no sig change from 10/18 EKG). 8. Thank you for allowing me to participate in the care of this individual.     This note was scribed in the presence of Yane Tyler MD by Sean Morejon RN.      I, Dr. Yane Tyler, personally performed the services described in this documentation, as scribed by the above signed scribe

## 2021-06-23 ENCOUNTER — OFFICE VISIT (OUTPATIENT)
Dept: CARDIOLOGY CLINIC | Age: 71
End: 2021-06-23
Payer: MEDICARE

## 2021-06-23 VITALS
HEART RATE: 62 BPM | OXYGEN SATURATION: 96 % | WEIGHT: 315 LBS | TEMPERATURE: 97.7 F | DIASTOLIC BLOOD PRESSURE: 68 MMHG | SYSTOLIC BLOOD PRESSURE: 138 MMHG | BODY MASS INDEX: 49.44 KG/M2 | HEIGHT: 67 IN

## 2021-06-23 DIAGNOSIS — E78.2 MIXED HYPERLIPIDEMIA: Primary | Chronic | ICD-10-CM

## 2021-06-23 DIAGNOSIS — I11.9 HYPERTENSIVE HEART DISEASE WITHOUT HEART FAILURE: ICD-10-CM

## 2021-06-23 DIAGNOSIS — I48.0 PAROXYSMAL A-FIB (HCC): Chronic | ICD-10-CM

## 2021-06-23 DIAGNOSIS — I25.110 CORONARY ARTERY DISEASE INVOLVING NATIVE CORONARY ARTERY OF NATIVE HEART WITH UNSTABLE ANGINA PECTORIS (HCC): Chronic | ICD-10-CM

## 2021-06-23 DIAGNOSIS — Z87.891 HISTORY OF TOBACCO ABUSE: ICD-10-CM

## 2021-06-23 DIAGNOSIS — I10 ESSENTIAL HYPERTENSION: ICD-10-CM

## 2021-06-23 DIAGNOSIS — I50.33 ACUTE ON CHRONIC DIASTOLIC HEART FAILURE (HCC): ICD-10-CM

## 2021-06-23 PROCEDURE — 99214 OFFICE O/P EST MOD 30 MIN: CPT | Performed by: INTERNAL MEDICINE

## 2021-06-23 PROCEDURE — 93000 ELECTROCARDIOGRAM COMPLETE: CPT | Performed by: INTERNAL MEDICINE

## 2021-06-23 RX ORDER — POTASSIUM CHLORIDE 20 MEQ/1
20 TABLET, EXTENDED RELEASE ORAL DAILY
Qty: 3 TABLET | Refills: 0
Start: 2021-06-23

## 2021-06-23 RX ORDER — FUROSEMIDE 80 MG
80 TABLET ORAL DAILY
Qty: 3 TABLET | Refills: 0
Start: 2021-06-23

## 2022-06-06 ENCOUNTER — HOSPITAL ENCOUNTER (OUTPATIENT)
Age: 72
Discharge: HOME | End: 2022-06-06
Payer: MEDICARE

## 2022-06-06 LAB
A/G RATIO: 1.4 (ref 1.1–2.2)
ALBUMIN SERPL-MCNC: 3.8 G/DL (ref 3.4–5)
ALP BLD-CCNC: 56 U/L (ref 40–129)
ALT SERPL-CCNC: 17 U/L (ref 10–40)
ANION GAP SERPL CALCULATED.3IONS-SCNC: 12 MMOL/L (ref 3–16)
AST SERPL-CCNC: 12 U/L (ref 15–37)
BASOPHILS ABSOLUTE: 0.1 K/UL (ref 0–0.2)
BASOPHILS RELATIVE PERCENT: 1.1 %
BILIRUB SERPL-MCNC: 0.5 MG/DL (ref 0–1)
BUN BLDV-MCNC: 19 MG/DL (ref 7–20)
CALCIUM SERPL-MCNC: 9.4 MG/DL (ref 8.3–10.6)
CHLORIDE BLD-SCNC: 102 MMOL/L (ref 99–110)
CHOLESTEROL, FASTING: 104 MG/DL (ref 0–199)
CO2: 26 MMOL/L (ref 21–32)
CREAT SERPL-MCNC: 1.1 MG/DL (ref 0.8–1.3)
EOSINOPHILS ABSOLUTE: 0.4 K/UL (ref 0–0.6)
EOSINOPHILS RELATIVE PERCENT: 6.1 %
GFR AFRICAN AMERICAN: >60
GFR NON-AFRICAN AMERICAN: >60
GLUCOSE FASTING: 179 MG/DL (ref 70–99)
HCT VFR BLD CALC: 37.9 % (ref 40.5–52.5)
HDLC SERPL-MCNC: 41 MG/DL (ref 40–60)
HEMOGLOBIN: 12.3 G/DL (ref 13.5–17.5)
LDL CHOLESTEROL CALCULATED: 45 MG/DL
LYMPHOCYTES ABSOLUTE: 1.7 K/UL (ref 1–5.1)
LYMPHOCYTES RELATIVE PERCENT: 26.8 %
MCH RBC QN AUTO: 27.7 PG (ref 26–34)
MCHC RBC AUTO-ENTMCNC: 32.4 G/DL (ref 31–36)
MCV RBC AUTO: 85.7 FL (ref 80–100)
MONOCYTES ABSOLUTE: 0.7 K/UL (ref 0–1.3)
MONOCYTES RELATIVE PERCENT: 10.4 %
NEUTROPHILS ABSOLUTE: 3.6 K/UL (ref 1.7–7.7)
NEUTROPHILS RELATIVE PERCENT: 55.6 %
PDW BLD-RTO: 15.2 % (ref 12.4–15.4)
PLATELET # BLD: 215 K/UL (ref 135–450)
PMV BLD AUTO: 7.2 FL (ref 5–10.5)
POTASSIUM SERPL-SCNC: 4.3 MMOL/L (ref 3.5–5.1)
RBC # BLD: 4.42 M/UL (ref 4.2–5.9)
SODIUM BLD-SCNC: 140 MMOL/L (ref 136–145)
TOTAL PROTEIN: 6.6 G/DL (ref 6.4–8.2)
TRIGLYCERIDE, FASTING: 88 MG/DL (ref 0–150)
VLDLC SERPL CALC-MCNC: 18 MG/DL
WBC # BLD: 6.5 K/UL (ref 4–11)

## 2022-06-06 PROCEDURE — 85025 COMPLETE CBC W/AUTO DIFF WBC: CPT

## 2022-06-06 PROCEDURE — 80053 COMPREHEN METABOLIC PANEL: CPT

## 2022-06-06 PROCEDURE — 80061 LIPID PANEL: CPT

## 2022-06-06 PROCEDURE — 36415 COLL VENOUS BLD VENIPUNCTURE: CPT

## 2022-06-20 NOTE — PROGRESS NOTES
[facility]   Cardiac Evaulation    Referring Provider:  Monica Snider MD     Chief Complaint   Patient presents with    Follow-up     9 month office visit    Congestive Heart Failure    Other     No new concerns      Subjective: Carey Randall presents today for chronic dCHF, aflutter, AFIB, s/p CABG 2V; no complaints today    Past Medical History:   Mr. Ronnie Lindsay is a 78yo male with PMH morbid obesity, CAD s/p LAD DELFINO 3/15 and 2V CABG 5/17, DM, JAKE on CPAP, history of PE, colon cancer s/p surgery, and OA. Admitted to [facility] 4/25/2017 with acute SOB and ischemic EKG changes. Underwent LHC by Dr. Caterina Renee 4/27/17 with MV CAD. Underwent s/p 2V CABG 5/2/17 by Dr. Andrew Grey. He developed A fib/Aflutter post-op now resolved. At rehab after d/c developed MRSA RLE where vein was harvested for surgery and underwent IV abx, wound vac, dressing changes. Most recent ECHO 5/16/17 EF=55-60%. Note EKG 8/24/17 showed NSR, 1st degree AV block, RBBB. Note 2 week event monitor 8/29-9/14/17 showed NSR avg HR 63 bpm, no major issues and d/c'd eliquis. Most recent EKG 6/23/2021 NSR with prolonged 1st degree AV block vs junctional rhythm; RBBB; LV; NST change (no sig change from 10/18 EKG). Today, he reports doing well. His wife is present. He still wears CPAP. He is complaint with medications. Patient with no complaints of chest pain, palpitations, dizziness, edema, or orthopnea/PND. He notes chronic sob that is not new. He uses a motorized scooter to get around. Weight today 460#, weight at last visit 6/23/2021 was 460#. Great-grandchildren (Demarco Beltran and Yarelis Johns) accompany them today.     Past Medical History:   has a past medical history of Arthritis, Asthma, BiPAP (biphasic positive airway pressure) dependence, CHF (congestive heart failure) ([address] Colon cancer ([facility] CPAP (continuous positive airway pressure) dependence, Depression, Diabetes mellitus ([facility] MRSA (methicillin resistant staph aureus) culture positive, PE (pulmonary embolism), Sleep apnea, and Vitamin D deficiency. Surgical History:   has a past surgical history that includes Knee Arthroplasty (Left); Colon surgery; hernia repair; Coronary angioplasty with stent (03/04/2015); Total shoulder arthroplasty (Right); Cardiac catheterization (04/27/2017); and Coronary artery bypass graft (05/02/2017). Social History:   reports that he quit smoking about 2013. His smoking use included cigars. He has a 5.00 pack-year smoking history. He has never used smokeless tobacco. He reports current alcohol use. He reports current drug use. Drug: Marijuana. Family History:  family history includes Heart Disease in his brother and mother. Home Medications:  Prior to Admission medications    Medication Sig Start Date End Date Taking? Authorizing Provider   furosemide (LASIX) 80 MG tablet Take 1 tablet by mouth daily 6/23/21  Yes Flaquito Vazquez MD   potassium chloride (KLOR-CON M) 20 MEQ extended release tablet Take 1 tablet by mouth daily 6/23/21  Yes Flaquito Vazquez MD   budesonide-formoterol (SYMBICORT) 80-4.5 MCG/ACT AERO Inhale 2 puffs into the lungs 2 times daily   Yes Historical Provider, MD   Semaglutide (OZEMPIC, 1 MG/DOSE, SC) Inject 1 mg into the skin once a week   Yes Historical Provider, MD   naproxen (NAPROSYN) 500 MG tablet Take 1 tablet by mouth 2 times daily for 20 doses 11/5/19 6/23/22 Yes Diana Hooper, APRN - CNP   insulin aspart (NOVOLOG) 100 UNIT/ML injection vial Inject 50 Units into the skin 2 times daily   Yes Historical Provider, MD   insulin 70-30 (NOVOLIN 70/30) (70-30) 100 UNIT per ML injection vial Inject 22 Units into the skin nightly   Yes Historical Provider, MD   Misc.  Devices (NOVA BATH SEAT) MISC by Does not apply route   Yes Historical Provider, MD   timolol (BETIMOL) 0.5 % ophthalmic solution 1 drop 2 times daily   Yes Historical Provider, MD   latanoprost (XALATAN) 0.005 % ophthalmic solution 1 drop nightly    Yes Historical Provider, MD   aspirin 325 MG EC tablet Take 1 tablet by mouth daily 9/20/17  Yes Guillermo Deng MD   atorvastatin (LIPITOR) 20 MG tablet Take 20 mg by mouth daily   Yes Historical Provider, MD   metoprolol tartrate (LOPRESSOR) 50 MG tablet Take 1 tablet by mouth 2 times daily 5/9/17  Yes Annita Villalta APRN - CNP   metFORMIN (GLUCOPHAGE) 1000 MG tablet Take 1,000 mg by mouth 2 times daily (with meals) 4/2/17  Yes Historical Provider, MD      Allergies:  Stadol [butorphanol tartrate]     Review of Systems:   · Constitutional: there has been no unanticipated weight loss. There's been no change in energy level, sleep pattern, or activity level. · Eyes: No visual changes or diplopia. No scleral icterus. · ENT: No Headaches, hearing loss or vertigo. No mouth sores or sore throat. · Cardiovascular: Reviewed in HPI  · Respiratory: No cough or wheezing, no sputum production. No hematemesis. · Gastrointestinal: No abdominal pain, appetite loss, blood in stools. No change in bowel or bladder habits. · Genitourinary: No dysuria, trouble voiding, or hematuria. · Musculoskeletal:  No gait disturbance, weakness or joint complaints. · Integumentary: No rash or pruritis. · Neurological: No headache, diplopia, change in muscle strength, numbness or tingling. No change in gait, balance, coordination, mood, affect, memory, mentation, behavior. · Psychiatric: No anxiety, no depression. · Endocrine: No malaise, fatigue or temperature intolerance. No excessive thirst, fluid intake, or urination. No tremor. · Hematologic/Lymphatic: No abnormal bruising or bleeding, blood clots or swollen lymph nodes. · Allergic/Immunologic: No nasal congestion or hives.     Physical Examination:    Vitals:    06/23/22 1340   BP: 118/74   Pulse: 76   Temp: 98.6 °F (37 °C)   SpO2: 94%     Wt Readings from Last 3 Encounters:   06/23/22 (!) 460 lb (208.7 kg)   06/23/21 (!) 460 lb 12.8 oz (209 kg)   09/21/20 (!) 462 lb (209.6 kg)          Constitutional and General Appearance: NAD; morbidly obese  Respiratory:  · Normal excursion and expansion without use of accessory muscles  · Resp Auscultation: Normal breath sounds without dullness. Clear, no crackles or wheezes. Cardiovascular:  · The apical impulses not displaced  · Heart tones are crisp and normal  · Cervical veins are not engorged  · The carotid upstroke is normal in amplitude and contour without delay or bruit  · Soft S1S2, No S3, No Murmur. RRR. · Peripheral pulses are symmetrical and full  · There is no clubbing, cyanosis of the extremities. · 1+  LLE edema , 2+ RLE skin red  · Femoral Arteries: 2+ and equal  · Pedal Pulses: 2+ and equal   Abdomen:  · Morbidly obese, No masses or tenderness  · Liver/Spleen: No Abnormalities Noted  Neurological/Psychiatric:  · Alert and oriented in all spheres  · Moves all extremities well  · Exhibits normal gait balance and coordination  · No abnormalities of mood, affect, memory, mentation, or behavior are noted  Skin:  · Skin: warm and dry.     Lab Results   Component Value Date     06/06/2022    K 4.3 06/06/2022     06/06/2022    CO2 26 06/06/2022    BUN 19 06/06/2022    CREATININE 1.1 06/06/2022    GLUCOSE 230 (H) 10/07/2020    CALCIUM 9.4 06/06/2022    PROT 6.6 06/06/2022    LABALBU 3.8 06/06/2022    BILITOT 0.5 06/06/2022    ALKPHOS 56 06/06/2022    AST 12 (L) 06/06/2022    ALT 17 06/06/2022    LABGLOM >60 06/06/2022    GFRAA >60 06/06/2022    AGRATIO 1.4 06/06/2022    GLOB 3.2 10/07/2020       Lab Results   Component Value Date    WBC 6.5 06/06/2022    HGB 12.3 (L) 06/06/2022    HCT 37.9 (L) 06/06/2022    MCV 85.7 06/06/2022     06/06/2022     Lab Results   Component Value Date    TSH 3.77 08/30/2010    T4FREE 1.1 05/16/2017     Lab Results   Component Value Date    LABA1C 8.2 10/26/2017     Lab Results   Component Value Date    .1 06/04/2017     Lab Results   Component Value Date    CHOL 104 06/06/2022    CHOL 123 04/26/2017    CHOL 177 04/06/2011     Lab Results   Component Value Date    TRIG 88 06/06/2022    TRIG 160 (H) 04/26/2017    TRIG 168 04/06/2011     Lab Results   Component Value Date    HDL 41 06/06/2022    HDL 41 10/07/2020    HDL 43 10/16/2019     Lab Results   Component Value Date    LDLCALC 45 06/06/2022    LDLCALC 45 10/07/2020    LDLCALC 45 10/16/2019     Lab Results   Component Value Date    LABVLDL 18 06/06/2022    LABVLDL 24 10/07/2020    LABVLDL 18 10/16/2019     Lab Results   Component Value Date    CHOLHDLRATIO 3.4 10/26/2017     Labs 6/23/20 (T[facility]) BUN 19 Cr 1.39 K+ 4.5    Assessment:     1. Typical atrial flutter ([address]):  PAF post-op resolved and no evidence for further atrial arrhythmia. Note 2 week event monitor 8/29-9/14/17 showed NSR avg HR 63 bpm, no major issues. Continue adult aspirin only. EKG's last few years without afib. 2. CAD s/p CABG  x2 (April 2017) & stents (2015):  He is s/p LAD DELFINO March 2015. He underwent LHC by Dr. Rama Osler 4/27/17 showing MV CAD and underwent s/p 2V CABG 5/2/17 by Dr. Joao Mcmahon. There are no concerning symptoms for angina currently. 3. Paroxysmal a-fib ([address]):  See #1 above. Resolved. 4. Essential hypertension: Well controlled and will continue current medical regimen. Note lisinopril stopped prior due to hypotension. NO need for ACE-I due to normal LVEF.     5       Hyperlipidemia:  Last lipids 6/6/2022 see above results I personally reviewed. Well controlled and at goal and will continue current medical regimen. Cost of prescription medications and patient compliance have been reviewed with patient. All questions answered. Plan:  1. Medications reviewed. Laney De La Vega MD fills his prescriptions. 2. Continue course  3. Recommend follow up with Laney De La Vega MD every 6 months and me yearly. 4. Recommend fasting labs prior to next years visit.   5. We can do an echo and/or nuc stress in the future if any new cardiac symptoms occur. 6. Check EKG next OV 2023. Thank you for allowing me to participate in the care of this individual.     This note was scribed in the presence of Jazmin Becker MD by Cheko Corral RN. I, Dr. Crescencio Chase, personally performed the services described in this documentation, as scribed by the above signed scribe in my presence. It is both accurate and complete to my knowledge. I agree with the details independently gathered by the clinical support staff, while the remaining scribed note accurately describes my personal service to the patient. Cost of prescription medications and patient compliance have been reviewed with patient. All questions answered. Severa Both.  Geri Rendon M.D., [facility]

## 2022-06-23 ENCOUNTER — OFFICE VISIT (OUTPATIENT)
Dept: CARDIOLOGY CLINIC | Age: 72
End: 2022-06-23
Payer: MEDICARE

## 2022-06-23 VITALS
HEIGHT: 67 IN | HEART RATE: 76 BPM | DIASTOLIC BLOOD PRESSURE: 74 MMHG | OXYGEN SATURATION: 94 % | BODY MASS INDEX: 49.44 KG/M2 | TEMPERATURE: 98.6 F | WEIGHT: 315 LBS | SYSTOLIC BLOOD PRESSURE: 118 MMHG

## 2022-06-23 DIAGNOSIS — I48.0 PAROXYSMAL A-FIB (HCC): Chronic | ICD-10-CM

## 2022-06-23 DIAGNOSIS — E78.2 MIXED HYPERLIPIDEMIA: Chronic | ICD-10-CM

## 2022-06-23 DIAGNOSIS — Z87.891 HISTORY OF TOBACCO ABUSE: ICD-10-CM

## 2022-06-23 DIAGNOSIS — I10 ESSENTIAL HYPERTENSION: ICD-10-CM

## 2022-06-23 DIAGNOSIS — I50.32 CHRONIC DIASTOLIC CHF (CONGESTIVE HEART FAILURE) (HCC): Chronic | ICD-10-CM

## 2022-06-23 DIAGNOSIS — I25.110 CORONARY ARTERY DISEASE INVOLVING NATIVE CORONARY ARTERY OF NATIVE HEART WITH UNSTABLE ANGINA PECTORIS (HCC): Primary | Chronic | ICD-10-CM

## 2022-06-23 PROCEDURE — 99214 OFFICE O/P EST MOD 30 MIN: CPT | Performed by: INTERNAL MEDICINE

## 2022-06-23 PROCEDURE — 1123F ACP DISCUSS/DSCN MKR DOCD: CPT | Performed by: INTERNAL MEDICINE

## 2023-08-14 ENCOUNTER — APPOINTMENT (OUTPATIENT)
Dept: CT IMAGING | Age: 73
End: 2023-08-14
Payer: MEDICARE

## 2023-08-14 ENCOUNTER — APPOINTMENT (OUTPATIENT)
Dept: GENERAL RADIOLOGY | Age: 73
End: 2023-08-14
Payer: MEDICARE

## 2023-08-14 ENCOUNTER — HOSPITAL ENCOUNTER (INPATIENT)
Age: 73
LOS: 3 days | Discharge: HOME | End: 2023-08-17
Attending: STUDENT IN AN ORGANIZED HEALTH CARE EDUCATION/TRAINING PROGRAM | Admitting: INTERNAL MEDICINE
Payer: MEDICARE

## 2023-08-14 DIAGNOSIS — M25.571 RIGHT ANKLE PAIN, UNSPECIFIED CHRONICITY: Primary | ICD-10-CM

## 2023-08-14 DIAGNOSIS — I50.32 CHRONIC DIASTOLIC HF (HEART FAILURE) (HCC): ICD-10-CM

## 2023-08-14 DIAGNOSIS — I48.92 ATRIAL FIBRILLATION AND FLUTTER (HCC): ICD-10-CM

## 2023-08-14 DIAGNOSIS — I48.91 ATRIAL FIBRILLATION AND FLUTTER (HCC): ICD-10-CM

## 2023-08-14 PROBLEM — M25.579 ANKLE PAIN, UNSPECIFIED CHRONICITY, UNSPECIFIED LATERALITY: Status: ACTIVE | Noted: 2023-08-14

## 2023-08-14 PROBLEM — E66.813 CLASS 3 SEVERE OBESITY WITH BODY MASS INDEX (BMI) GREATER THAN OR EQUAL TO 70 IN ADULT: Status: ACTIVE | Noted: 2017-04-25

## 2023-08-14 PROBLEM — M10.9 GOUT: Status: ACTIVE | Noted: 2023-08-14

## 2023-08-14 LAB
ANION GAP SERPL CALCULATED.3IONS-SCNC: 14 MMOL/L (ref 3–16)
APTT BLD: 37.8 SEC (ref 22.7–35.9)
BASOPHILS # BLD: 0.1 K/UL (ref 0–0.2)
BASOPHILS NFR BLD: 1.5 %
BUN SERPL-MCNC: 17 MG/DL (ref 7–20)
CALCIUM SERPL-MCNC: 9.3 MG/DL (ref 8.3–10.6)
CHLORIDE SERPL-SCNC: 96 MMOL/L (ref 99–110)
CK SERPL-CCNC: 195 U/L (ref 39–308)
CO2 SERPL-SCNC: 25 MMOL/L (ref 21–32)
CREAT SERPL-MCNC: 1.2 MG/DL (ref 0.8–1.3)
CRP SERPL-MCNC: 165.8 MG/L (ref 0–5.1)
D DIMER: 0.68 UG/ML FEU (ref 0–0.6)
DEPRECATED RDW RBC AUTO: 15.4 % (ref 12.4–15.4)
DEPRECATED RDW RBC AUTO: 15.6 % (ref 12.4–15.4)
EKG ATRIAL RATE: 234 BPM
EKG DIAGNOSIS: NORMAL
EKG P AXIS: 258 DEGREES
EKG Q-T INTERVAL: 442 MS
EKG QRS DURATION: 136 MS
EKG QTC CALCULATION (BAZETT): 437 MS
EKG R AXIS: -45 DEGREES
EKG T AXIS: 122 DEGREES
EKG VENTRICULAR RATE: 59 BPM
EOSINOPHIL # BLD: 0.2 K/UL (ref 0–0.6)
EOSINOPHIL NFR BLD: 2.2 %
ERYTHROCYTE [SEDIMENTATION RATE] IN BLOOD BY WESTERGREN METHOD: 103 MM/HR (ref 0–20)
GFR SERPLBLD CREATININE-BSD FMLA CKD-EPI: >60 ML/MIN/{1.73_M2}
GLUCOSE BLD-MCNC: 187 MG/DL (ref 70–99)
GLUCOSE SERPL-MCNC: 169 MG/DL (ref 70–99)
HCT VFR BLD AUTO: 37 % (ref 40.5–52.5)
HCT VFR BLD AUTO: 37 % (ref 40.5–52.5)
HGB BLD-MCNC: 12.1 G/DL (ref 13.5–17.5)
HGB BLD-MCNC: 12.3 G/DL (ref 13.5–17.5)
LYMPHOCYTES # BLD: 1.6 K/UL (ref 1–5.1)
LYMPHOCYTES NFR BLD: 17.9 %
MCH RBC QN AUTO: 28.1 PG (ref 26–34)
MCH RBC QN AUTO: 28.3 PG (ref 26–34)
MCHC RBC AUTO-ENTMCNC: 32.7 G/DL (ref 31–36)
MCHC RBC AUTO-ENTMCNC: 33.2 G/DL (ref 31–36)
MCV RBC AUTO: 85.1 FL (ref 80–100)
MCV RBC AUTO: 85.7 FL (ref 80–100)
MONOCYTES # BLD: 0.9 K/UL (ref 0–1.3)
MONOCYTES NFR BLD: 10.2 %
NEUTROPHILS # BLD: 6.2 K/UL (ref 1.7–7.7)
NEUTROPHILS NFR BLD: 68.2 %
NT-PROBNP SERPL-MCNC: 1398 PG/ML (ref 0–124)
PERFORMED ON: ABNORMAL
PLATELET # BLD AUTO: 186 K/UL (ref 135–450)
PLATELET # BLD AUTO: 191 K/UL (ref 135–450)
PMV BLD AUTO: 7.2 FL (ref 5–10.5)
PMV BLD AUTO: 7.3 FL (ref 5–10.5)
POTASSIUM SERPL-SCNC: 3.9 MMOL/L (ref 3.5–5.1)
PROCALCITONIN SERPL IA-MCNC: 0.16 NG/ML (ref 0–0.15)
RBC # BLD AUTO: 4.31 M/UL (ref 4.2–5.9)
RBC # BLD AUTO: 4.35 M/UL (ref 4.2–5.9)
SODIUM SERPL-SCNC: 135 MMOL/L (ref 136–145)
TROPONIN, HIGH SENSITIVITY: 33 NG/L (ref 0–22)
TROPONIN, HIGH SENSITIVITY: 37 NG/L (ref 0–22)
TROPONIN, HIGH SENSITIVITY: 38 NG/L (ref 0–22)
URATE SERPL-MCNC: 8.9 MG/DL (ref 3.5–7.2)
WBC # BLD AUTO: 9 K/UL (ref 4–11)
WBC # BLD AUTO: 9.8 K/UL (ref 4–11)

## 2023-08-14 PROCEDURE — 73700 CT LOWER EXTREMITY W/O DYE: CPT

## 2023-08-14 PROCEDURE — 85379 FIBRIN DEGRADATION QUANT: CPT

## 2023-08-14 PROCEDURE — 84484 ASSAY OF TROPONIN QUANT: CPT

## 2023-08-14 PROCEDURE — 83036 HEMOGLOBIN GLYCOSYLATED A1C: CPT

## 2023-08-14 PROCEDURE — 85652 RBC SED RATE AUTOMATED: CPT

## 2023-08-14 PROCEDURE — 82550 ASSAY OF CK (CPK): CPT

## 2023-08-14 PROCEDURE — 93005 ELECTROCARDIOGRAM TRACING: CPT | Performed by: STUDENT IN AN ORGANIZED HEALTH CARE EDUCATION/TRAINING PROGRAM

## 2023-08-14 PROCEDURE — 93010 ELECTROCARDIOGRAM REPORT: CPT | Performed by: INTERNAL MEDICINE

## 2023-08-14 PROCEDURE — 2580000003 HC RX 258: Performed by: STUDENT IN AN ORGANIZED HEALTH CARE EDUCATION/TRAINING PROGRAM

## 2023-08-14 PROCEDURE — 96374 THER/PROPH/DIAG INJ IV PUSH: CPT

## 2023-08-14 PROCEDURE — 85025 COMPLETE CBC W/AUTO DIFF WBC: CPT

## 2023-08-14 PROCEDURE — 85027 COMPLETE CBC AUTOMATED: CPT

## 2023-08-14 PROCEDURE — 2580000003 HC RX 258

## 2023-08-14 PROCEDURE — 6360000002 HC RX W HCPCS

## 2023-08-14 PROCEDURE — 99223 1ST HOSP IP/OBS HIGH 75: CPT

## 2023-08-14 PROCEDURE — 2060000000 HC ICU INTERMEDIATE R&B

## 2023-08-14 PROCEDURE — 6370000000 HC RX 637 (ALT 250 FOR IP): Performed by: STUDENT IN AN ORGANIZED HEALTH CARE EDUCATION/TRAINING PROGRAM

## 2023-08-14 PROCEDURE — 99285 EMERGENCY DEPT VISIT HI MDM: CPT

## 2023-08-14 PROCEDURE — 84145 PROCALCITONIN (PCT): CPT

## 2023-08-14 PROCEDURE — 86140 C-REACTIVE PROTEIN: CPT

## 2023-08-14 PROCEDURE — 6360000002 HC RX W HCPCS: Performed by: STUDENT IN AN ORGANIZED HEALTH CARE EDUCATION/TRAINING PROGRAM

## 2023-08-14 PROCEDURE — 85730 THROMBOPLASTIN TIME PARTIAL: CPT

## 2023-08-14 PROCEDURE — 73610 X-RAY EXAM OF ANKLE: CPT

## 2023-08-14 PROCEDURE — 83880 ASSAY OF NATRIURETIC PEPTIDE: CPT

## 2023-08-14 PROCEDURE — 71045 X-RAY EXAM CHEST 1 VIEW: CPT

## 2023-08-14 PROCEDURE — 6370000000 HC RX 637 (ALT 250 FOR IP)

## 2023-08-14 PROCEDURE — 84550 ASSAY OF BLOOD/URIC ACID: CPT

## 2023-08-14 PROCEDURE — 36415 COLL VENOUS BLD VENIPUNCTURE: CPT

## 2023-08-14 PROCEDURE — 80048 BASIC METABOLIC PNL TOTAL CA: CPT

## 2023-08-14 RX ORDER — POTASSIUM CHLORIDE 7.45 MG/ML
10 INJECTION INTRAVENOUS PRN
Status: DISCONTINUED | OUTPATIENT
Start: 2023-08-14 | End: 2023-08-17 | Stop reason: HOSPADM

## 2023-08-14 RX ORDER — LATANOPROST 50 UG/ML
1 SOLUTION/ DROPS OPHTHALMIC NIGHTLY
Status: DISCONTINUED | OUTPATIENT
Start: 2023-08-14 | End: 2023-08-17 | Stop reason: HOSPADM

## 2023-08-14 RX ORDER — INSULIN LISPRO 100 [IU]/ML
0-8 INJECTION, SOLUTION INTRAVENOUS; SUBCUTANEOUS
Status: DISCONTINUED | OUTPATIENT
Start: 2023-08-14 | End: 2023-08-14

## 2023-08-14 RX ORDER — OXYCODONE HYDROCHLORIDE AND ACETAMINOPHEN 5; 325 MG/1; MG/1
1 TABLET ORAL ONCE
Status: COMPLETED | OUTPATIENT
Start: 2023-08-14 | End: 2023-08-14

## 2023-08-14 RX ORDER — SODIUM CHLORIDE 0.9 % (FLUSH) 0.9 %
5-40 SYRINGE (ML) INJECTION EVERY 12 HOURS SCHEDULED
Status: DISCONTINUED | OUTPATIENT
Start: 2023-08-14 | End: 2023-08-17 | Stop reason: HOSPADM

## 2023-08-14 RX ORDER — METOPROLOL TARTRATE 50 MG/1
50 TABLET, FILM COATED ORAL 2 TIMES DAILY
Status: DISCONTINUED | OUTPATIENT
Start: 2023-08-14 | End: 2023-08-17

## 2023-08-14 RX ORDER — INSULIN LISPRO 100 [IU]/ML
0-4 INJECTION, SOLUTION INTRAVENOUS; SUBCUTANEOUS NIGHTLY
Status: DISCONTINUED | OUTPATIENT
Start: 2023-08-14 | End: 2023-08-17 | Stop reason: HOSPADM

## 2023-08-14 RX ORDER — INSULIN LISPRO 100 [IU]/ML
0-4 INJECTION, SOLUTION INTRAVENOUS; SUBCUTANEOUS
Status: DISCONTINUED | OUTPATIENT
Start: 2023-08-15 | End: 2023-08-14

## 2023-08-14 RX ORDER — SODIUM CHLORIDE 0.9 % (FLUSH) 0.9 %
10 SYRINGE (ML) INJECTION PRN
Status: DISCONTINUED | OUTPATIENT
Start: 2023-08-14 | End: 2023-08-17 | Stop reason: HOSPADM

## 2023-08-14 RX ORDER — DEXTROSE MONOHYDRATE 100 MG/ML
INJECTION, SOLUTION INTRAVENOUS CONTINUOUS PRN
Status: DISCONTINUED | OUTPATIENT
Start: 2023-08-14 | End: 2023-08-17 | Stop reason: HOSPADM

## 2023-08-14 RX ORDER — ATORVASTATIN CALCIUM 10 MG/1
20 TABLET, FILM COATED ORAL NIGHTLY
Status: DISCONTINUED | OUTPATIENT
Start: 2023-08-14 | End: 2023-08-17 | Stop reason: HOSPADM

## 2023-08-14 RX ORDER — ONDANSETRON 2 MG/ML
4 INJECTION INTRAMUSCULAR; INTRAVENOUS EVERY 6 HOURS PRN
Status: DISCONTINUED | OUTPATIENT
Start: 2023-08-14 | End: 2023-08-17 | Stop reason: HOSPADM

## 2023-08-14 RX ORDER — POTASSIUM CHLORIDE 20 MEQ/1
40 TABLET, EXTENDED RELEASE ORAL PRN
Status: DISCONTINUED | OUTPATIENT
Start: 2023-08-14 | End: 2023-08-17 | Stop reason: HOSPADM

## 2023-08-14 RX ORDER — HEPARIN SODIUM 1000 [USP'U]/ML
4000 INJECTION, SOLUTION INTRAVENOUS; SUBCUTANEOUS PRN
Status: DISCONTINUED | OUTPATIENT
Start: 2023-08-14 | End: 2023-08-15

## 2023-08-14 RX ORDER — ASPIRIN 325 MG
325 TABLET, DELAYED RELEASE (ENTERIC COATED) ORAL DAILY
Status: DISCONTINUED | OUTPATIENT
Start: 2023-08-15 | End: 2023-08-15

## 2023-08-14 RX ORDER — ACETAMINOPHEN 325 MG/1
650 TABLET ORAL EVERY 6 HOURS PRN
Status: DISCONTINUED | OUTPATIENT
Start: 2023-08-14 | End: 2023-08-17 | Stop reason: HOSPADM

## 2023-08-14 RX ORDER — HEPARIN SODIUM 1000 [USP'U]/ML
4000 INJECTION, SOLUTION INTRAVENOUS; SUBCUTANEOUS ONCE
Status: COMPLETED | OUTPATIENT
Start: 2023-08-14 | End: 2023-08-14

## 2023-08-14 RX ORDER — HEPARIN SODIUM 1000 [USP'U]/ML
2000 INJECTION, SOLUTION INTRAVENOUS; SUBCUTANEOUS PRN
Status: DISCONTINUED | OUTPATIENT
Start: 2023-08-14 | End: 2023-08-17 | Stop reason: HOSPADM

## 2023-08-14 RX ORDER — ACETAMINOPHEN 650 MG/1
650 SUPPOSITORY RECTAL EVERY 6 HOURS PRN
Status: DISCONTINUED | OUTPATIENT
Start: 2023-08-14 | End: 2023-08-17 | Stop reason: HOSPADM

## 2023-08-14 RX ORDER — HEPARIN SODIUM 10000 [USP'U]/100ML
5-30 INJECTION, SOLUTION INTRAVENOUS CONTINUOUS
Status: DISCONTINUED | OUTPATIENT
Start: 2023-08-14 | End: 2023-08-14 | Stop reason: SDUPTHER

## 2023-08-14 RX ORDER — INSULIN LISPRO 100 [IU]/ML
0-16 INJECTION, SOLUTION INTRAVENOUS; SUBCUTANEOUS
Status: DISCONTINUED | OUTPATIENT
Start: 2023-08-15 | End: 2023-08-17 | Stop reason: HOSPADM

## 2023-08-14 RX ORDER — HEPARIN SODIUM 10000 [USP'U]/100ML
12.1 INJECTION, SOLUTION INTRAVENOUS CONTINUOUS
Status: DISCONTINUED | OUTPATIENT
Start: 2023-08-14 | End: 2023-08-15

## 2023-08-14 RX ORDER — KETOROLAC TROMETHAMINE 30 MG/ML
15 INJECTION, SOLUTION INTRAMUSCULAR; INTRAVENOUS ONCE
Status: COMPLETED | OUTPATIENT
Start: 2023-08-14 | End: 2023-08-14

## 2023-08-14 RX ORDER — COLCHICINE 0.6 MG/1
0.6 CAPSULE ORAL 2 TIMES DAILY
Status: DISCONTINUED | OUTPATIENT
Start: 2023-08-14 | End: 2023-08-17 | Stop reason: HOSPADM

## 2023-08-14 RX ORDER — ONDANSETRON 4 MG/1
4 TABLET, ORALLY DISINTEGRATING ORAL EVERY 8 HOURS PRN
Status: DISCONTINUED | OUTPATIENT
Start: 2023-08-14 | End: 2023-08-17 | Stop reason: HOSPADM

## 2023-08-14 RX ORDER — INSULIN LISPRO 100 [IU]/ML
0-4 INJECTION, SOLUTION INTRAVENOUS; SUBCUTANEOUS NIGHTLY
Status: DISCONTINUED | OUTPATIENT
Start: 2023-08-14 | End: 2023-08-14

## 2023-08-14 RX ORDER — POLYETHYLENE GLYCOL 3350 17 G/17G
17 POWDER, FOR SOLUTION ORAL DAILY PRN
Status: DISCONTINUED | OUTPATIENT
Start: 2023-08-14 | End: 2023-08-17 | Stop reason: HOSPADM

## 2023-08-14 RX ORDER — FUROSEMIDE 40 MG/1
80 TABLET ORAL DAILY
Status: DISCONTINUED | OUTPATIENT
Start: 2023-08-15 | End: 2023-08-15

## 2023-08-14 RX ORDER — MAGNESIUM SULFATE 1 G/100ML
1000 INJECTION INTRAVENOUS PRN
Status: DISCONTINUED | OUTPATIENT
Start: 2023-08-14 | End: 2023-08-17 | Stop reason: HOSPADM

## 2023-08-14 RX ORDER — SODIUM CHLORIDE 9 MG/ML
INJECTION, SOLUTION INTRAVENOUS PRN
Status: DISCONTINUED | OUTPATIENT
Start: 2023-08-14 | End: 2023-08-17 | Stop reason: HOSPADM

## 2023-08-14 RX ORDER — HYDROCODONE BITARTRATE AND ACETAMINOPHEN 5; 325 MG/1; MG/1
1 TABLET ORAL EVERY 6 HOURS PRN
Status: DISCONTINUED | OUTPATIENT
Start: 2023-08-14 | End: 2023-08-17 | Stop reason: HOSPADM

## 2023-08-14 RX ORDER — TIMOLOL MALEATE 5 MG/ML
1 SOLUTION/ DROPS OPHTHALMIC 2 TIMES DAILY
Status: DISCONTINUED | OUTPATIENT
Start: 2023-08-14 | End: 2023-08-17 | Stop reason: HOSPADM

## 2023-08-14 RX ORDER — DEXTROSE MONOHYDRATE 100 MG/ML
INJECTION, SOLUTION INTRAVENOUS CONTINUOUS PRN
Status: DISCONTINUED | OUTPATIENT
Start: 2023-08-14 | End: 2023-08-14 | Stop reason: SDUPTHER

## 2023-08-14 RX ORDER — ENOXAPARIN SODIUM 100 MG/ML
60 INJECTION SUBCUTANEOUS 2 TIMES DAILY
Status: DISCONTINUED | OUTPATIENT
Start: 2023-08-14 | End: 2023-08-14

## 2023-08-14 RX ADMIN — TIMOLOL MALEATE 1 DROP: 5 SOLUTION OPHTHALMIC at 21:38

## 2023-08-14 RX ADMIN — HEPARIN SODIUM 8.1 UNITS/KG/HR: 10000 INJECTION, SOLUTION INTRAVENOUS at 19:58

## 2023-08-14 RX ADMIN — Medication 10 ML: at 21:34

## 2023-08-14 RX ADMIN — METOPROLOL TARTRATE 50 MG: 50 TABLET, FILM COATED ORAL at 21:35

## 2023-08-14 RX ADMIN — OXYCODONE HYDROCHLORIDE AND ACETAMINOPHEN 1 TABLET: 5; 325 TABLET ORAL at 11:55

## 2023-08-14 RX ADMIN — POLYETHYLENE GLYCOL (3350) 17 G: 17 POWDER, FOR SOLUTION ORAL at 21:34

## 2023-08-14 RX ADMIN — HYDROCODONE BITARTRATE AND ACETAMINOPHEN 1 TABLET: 5; 325 TABLET ORAL at 21:33

## 2023-08-14 RX ADMIN — ATORVASTATIN CALCIUM 20 MG: 10 TABLET, FILM COATED ORAL at 21:34

## 2023-08-14 RX ADMIN — CEFAZOLIN 2000 MG: 2 INJECTION, POWDER, FOR SOLUTION INTRAMUSCULAR; INTRAVENOUS at 16:36

## 2023-08-14 RX ADMIN — LATANOPROST 1 DROP: 50 SOLUTION OPHTHALMIC at 21:37

## 2023-08-14 RX ADMIN — HEPARIN SODIUM 4000 UNITS: 1000 INJECTION INTRAVENOUS; SUBCUTANEOUS at 19:52

## 2023-08-14 RX ADMIN — COLCHICINE 0.6 MG: 0.6 CAPSULE ORAL at 21:32

## 2023-08-14 RX ADMIN — KETOROLAC TROMETHAMINE 15 MG: 30 INJECTION, SOLUTION INTRAMUSCULAR; INTRAVENOUS at 11:55

## 2023-08-14 ASSESSMENT — PAIN SCALES - GENERAL
PAINLEVEL_OUTOF10: 9
PAINLEVEL_OUTOF10: 10

## 2023-08-14 ASSESSMENT — LIFESTYLE VARIABLES: HOW OFTEN DO YOU HAVE A DRINK CONTAINING ALCOHOL: NEVER

## 2023-08-14 ASSESSMENT — PAIN DESCRIPTION - PAIN TYPE: TYPE: ACUTE PAIN

## 2023-08-14 ASSESSMENT — PAIN DESCRIPTION - LOCATION
LOCATION: ANKLE;FOOT
LOCATION: ANKLE

## 2023-08-14 ASSESSMENT — PAIN DESCRIPTION - DESCRIPTORS
DESCRIPTORS: SHOOTING
DESCRIPTORS: ACHING

## 2023-08-14 ASSESSMENT — PAIN DESCRIPTION - ORIENTATION: ORIENTATION: RIGHT

## 2023-08-14 ASSESSMENT — PAIN - FUNCTIONAL ASSESSMENT: PAIN_FUNCTIONAL_ASSESSMENT: 0-10

## 2023-08-14 NOTE — ED PROVIDER NOTES
[address] [facility]), Pulse: 60,Respirations: 18, SpO2: 95 %     Procedures     na    ED Course and MDM     Francia Campo is a 68 y.o. male who presents with atraumatic right ankle and foot pain. A greatest concern would be a septic arthritis or perhaps a gout of the right ankle. Is difficult to characterize the acute changes to the skin but additional considerations given the possibility of a cellulitis throughout the foot. There is no particular induration or foot wounds as a potential source. We will evaluate labs treat pain and assess an x-ray to better characterize his condition. ED Course as of 08/14/23 1502   Mon Aug 14, 2023   1314 Initial labs are benign. Plain film x-ray of the right ankle is without abnormality. I completed a limited bedside ultrasound for joint effusion which was equivocal.  Given this I will obtain CT imaging to better characterize as he may require a arthrocentesis out of concern for septic arthritis versus gout. His family came to bedside and they have greater concern for his nonambulatory status now as well as intermittent episodes of chest pain and shortness of breath. He does have an extensive cardiac history as well as primary lung disease and we will expand his work-up to better characterize any high risk cardiopulmonary etiology at this time. Regardless he will likely require admission given his functional decline. [NG]   6688 Patient is reassessed clinical condition is unchanged. His BNP is only mildly elevated his troponin is only mildly elevated at 33 with no ischemic changes on EKG. I have low concern for high risk acute cardiopulmonary complication including ACS or significant CHF exacerbation. CT imaging of the ankle shows no significant joint effusion.   There is some subcutaneous fluid and on further consideration of his ultrasound he did have cobblestoning at the skin and without significant effusion favor that his presentation may suggest a cellulitis of the foot and Disposition     PATIENT REFERRED TO:  No follow-up provider specified.     DISCHARGE MEDICATIONS:  New Prescriptions    No medications on file       DISPOSITION Decision To Admit 08/14/2023 01:12:41 PM      Yajaira Collier MD (electronically signed)  Attending Emergency Physician             Lily Monroy MD  08/14/23 8016

## 2023-08-14 NOTE — ED NOTES
Patient was able to urinate on own, straight cath not required.  MD aware Merle Gilford, RN  08/14/23 1748

## 2023-08-14 NOTE — PROGRESS NOTES
Telemetry Assignment Communication Form    Patient has orders for continuous telemetry OR pulse oximeter only orders    To be filled out by Clinical    Patient has Admission or Transfer orders and is assigned to Room Number: 316    Continuous Telemetry:  Yes       WITH/WITHOUT: without Continuous pulse ox    Patient has pulse ox ONLY orders:  No    (Once this top section is completed:  Select \"Route\" and send note to Fax number: [phone] ))      ___________________________________________________________________________      [facility]    Patient assigned to tele box number: __________________               (to be written in [facility] when telemetry box is assigned to patient)    ___________________________________________________________________________      Bedside RN confirming that the box listed above is in fact the telemetry box number being placed on the patient listed above.         X________________________________________ RN signature        __________________________________________RN assigned to Patient (please print)    _______________ Date    ____________ Time

## 2023-08-15 PROBLEM — M25.571 RIGHT ANKLE PAIN: Status: ACTIVE | Noted: 2023-08-14

## 2023-08-15 PROBLEM — I48.4 ATYPICAL ATRIAL FLUTTER (HCC): Status: ACTIVE | Noted: 2023-08-15

## 2023-08-15 LAB
ANION GAP SERPL CALCULATED.3IONS-SCNC: 12 MMOL/L (ref 3–16)
ANTI-XA UNFRAC HEPARIN: 0.18 IU/ML (ref 0.3–0.7)
ANTI-XA UNFRAC HEPARIN: <0.1 IU/ML (ref 0.3–0.7)
BASOPHILS # BLD: 0.1 K/UL (ref 0–0.2)
BASOPHILS NFR BLD: 0.8 %
BUN SERPL-MCNC: 19 MG/DL (ref 7–20)
CALCIUM SERPL-MCNC: 9.3 MG/DL (ref 8.3–10.6)
CHLORIDE SERPL-SCNC: 99 MMOL/L (ref 99–110)
CO2 SERPL-SCNC: 25 MMOL/L (ref 21–32)
CREAT SERPL-MCNC: 1 MG/DL (ref 0.8–1.3)
DEPRECATED RDW RBC AUTO: 15.3 % (ref 12.4–15.4)
EKG ATRIAL RATE: 250 BPM
EKG DIAGNOSIS: NORMAL
EKG P AXIS: 258 DEGREES
EKG Q-T INTERVAL: 444 MS
EKG QRS DURATION: 138 MS
EKG QTC CALCULATION (BAZETT): 475 MS
EKG R AXIS: -60 DEGREES
EKG T AXIS: 106 DEGREES
EKG VENTRICULAR RATE: 69 BPM
EOSINOPHIL # BLD: 0.3 K/UL (ref 0–0.6)
EOSINOPHIL NFR BLD: 3.2 %
EST. AVERAGE GLUCOSE BLD GHB EST-MCNC: 145.6 MG/DL
GFR SERPLBLD CREATININE-BSD FMLA CKD-EPI: >60 ML/MIN/{1.73_M2}
GLUCOSE BLD-MCNC: 154 MG/DL (ref 70–99)
GLUCOSE BLD-MCNC: 176 MG/DL (ref 70–99)
GLUCOSE BLD-MCNC: 201 MG/DL (ref 70–99)
GLUCOSE BLD-MCNC: 265 MG/DL (ref 70–99)
GLUCOSE SERPL-MCNC: 168 MG/DL (ref 70–99)
HBA1C MFR BLD: 6.7 %
HCT VFR BLD AUTO: 37.6 % (ref 40.5–52.5)
HGB BLD-MCNC: 12.2 G/DL (ref 13.5–17.5)
LV EF: 53 %
LVEF MODALITY: NORMAL
LYMPHOCYTES # BLD: 1.4 K/UL (ref 1–5.1)
LYMPHOCYTES NFR BLD: 16.8 %
MCH RBC QN AUTO: 28 PG (ref 26–34)
MCHC RBC AUTO-ENTMCNC: 32.6 G/DL (ref 31–36)
MCV RBC AUTO: 85.9 FL (ref 80–100)
MONOCYTES # BLD: 0.8 K/UL (ref 0–1.3)
MONOCYTES NFR BLD: 9.1 %
NEUTROPHILS # BLD: 5.9 K/UL (ref 1.7–7.7)
NEUTROPHILS NFR BLD: 70.1 %
PERFORMED ON: ABNORMAL
PLATELET # BLD AUTO: 191 K/UL (ref 135–450)
PMV BLD AUTO: 7.2 FL (ref 5–10.5)
POTASSIUM SERPL-SCNC: 4.1 MMOL/L (ref 3.5–5.1)
RBC # BLD AUTO: 4.38 M/UL (ref 4.2–5.9)
SODIUM SERPL-SCNC: 136 MMOL/L (ref 136–145)
TSH SERPL DL<=0.005 MIU/L-ACNC: 2.79 UIU/ML (ref 0.27–4.2)
WBC # BLD AUTO: 8.4 K/UL (ref 4–11)

## 2023-08-15 PROCEDURE — 2580000003 HC RX 258: Performed by: INTERNAL MEDICINE

## 2023-08-15 PROCEDURE — 84443 ASSAY THYROID STIM HORMONE: CPT

## 2023-08-15 PROCEDURE — 97530 THERAPEUTIC ACTIVITIES: CPT

## 2023-08-15 PROCEDURE — 99222 1ST HOSP IP/OBS MODERATE 55: CPT | Performed by: INTERNAL MEDICINE

## 2023-08-15 PROCEDURE — 6370000000 HC RX 637 (ALT 250 FOR IP): Performed by: INTERNAL MEDICINE

## 2023-08-15 PROCEDURE — 80048 BASIC METABOLIC PNL TOTAL CA: CPT

## 2023-08-15 PROCEDURE — 93306 TTE W/DOPPLER COMPLETE: CPT

## 2023-08-15 PROCEDURE — 6360000002 HC RX W HCPCS

## 2023-08-15 PROCEDURE — 85520 HEPARIN ASSAY: CPT

## 2023-08-15 PROCEDURE — 93005 ELECTROCARDIOGRAM TRACING: CPT

## 2023-08-15 PROCEDURE — 6360000002 HC RX W HCPCS: Performed by: INTERNAL MEDICINE

## 2023-08-15 PROCEDURE — 99233 SBSQ HOSP IP/OBS HIGH 50: CPT | Performed by: INTERNAL MEDICINE

## 2023-08-15 PROCEDURE — 85025 COMPLETE CBC W/AUTO DIFF WBC: CPT

## 2023-08-15 PROCEDURE — 2060000000 HC ICU INTERMEDIATE R&B

## 2023-08-15 PROCEDURE — 2580000003 HC RX 258

## 2023-08-15 PROCEDURE — 36415 COLL VENOUS BLD VENIPUNCTURE: CPT

## 2023-08-15 PROCEDURE — 93010 ELECTROCARDIOGRAM REPORT: CPT | Performed by: INTERNAL MEDICINE

## 2023-08-15 PROCEDURE — 97166 OT EVAL MOD COMPLEX 45 MIN: CPT

## 2023-08-15 PROCEDURE — 6370000000 HC RX 637 (ALT 250 FOR IP)

## 2023-08-15 PROCEDURE — 97161 PT EVAL LOW COMPLEX 20 MIN: CPT

## 2023-08-15 RX ORDER — FUROSEMIDE 10 MG/ML
40 INJECTION INTRAMUSCULAR; INTRAVENOUS 2 TIMES DAILY
Status: DISCONTINUED | OUTPATIENT
Start: 2023-08-15 | End: 2023-08-16

## 2023-08-15 RX ORDER — AMIODARONE HYDROCHLORIDE 200 MG/1
200 TABLET ORAL 2 TIMES DAILY
Status: DISCONTINUED | OUTPATIENT
Start: 2023-08-15 | End: 2023-08-17 | Stop reason: HOSPADM

## 2023-08-15 RX ADMIN — COLCHICINE 0.6 MG: 0.6 CAPSULE ORAL at 10:41

## 2023-08-15 RX ADMIN — AMIODARONE HYDROCHLORIDE 200 MG: 200 TABLET ORAL at 20:36

## 2023-08-15 RX ADMIN — FUROSEMIDE 40 MG: 10 INJECTION, SOLUTION INTRAMUSCULAR; INTRAVENOUS at 18:37

## 2023-08-15 RX ADMIN — Medication 10 ML: at 10:41

## 2023-08-15 RX ADMIN — METHYLPREDNISOLONE SODIUM SUCCINATE 40 MG: 40 INJECTION INTRAMUSCULAR; INTRAVENOUS at 16:59

## 2023-08-15 RX ADMIN — METOPROLOL TARTRATE 50 MG: 50 TABLET, FILM COATED ORAL at 10:40

## 2023-08-15 RX ADMIN — INSULIN LISPRO 4 UNITS: 100 INJECTION, SOLUTION INTRAVENOUS; SUBCUTANEOUS at 17:05

## 2023-08-15 RX ADMIN — HEPARIN SODIUM 4000 UNITS: 1000 INJECTION INTRAVENOUS; SUBCUTANEOUS at 04:16

## 2023-08-15 RX ADMIN — APIXABAN 5 MG: 5 TABLET, FILM COATED ORAL at 20:36

## 2023-08-15 RX ADMIN — LATANOPROST 1 DROP: 50 SOLUTION OPHTHALMIC at 20:38

## 2023-08-15 RX ADMIN — ATORVASTATIN CALCIUM 20 MG: 10 TABLET, FILM COATED ORAL at 20:36

## 2023-08-15 RX ADMIN — TIMOLOL MALEATE 1 DROP: 5 SOLUTION OPHTHALMIC at 10:45

## 2023-08-15 RX ADMIN — Medication 10 ML: at 20:36

## 2023-08-15 RX ADMIN — FUROSEMIDE 40 MG: 10 INJECTION, SOLUTION INTRAMUSCULAR; INTRAVENOUS at 10:40

## 2023-08-15 RX ADMIN — HYDROCODONE BITARTRATE AND ACETAMINOPHEN 1 TABLET: 5; 325 TABLET ORAL at 10:41

## 2023-08-15 RX ADMIN — COLCHICINE 0.6 MG: 0.6 CAPSULE ORAL at 20:36

## 2023-08-15 RX ADMIN — APIXABAN 5 MG: 5 TABLET, FILM COATED ORAL at 10:41

## 2023-08-15 RX ADMIN — TIMOLOL MALEATE 1 DROP: 5 SOLUTION OPHTHALMIC at 20:37

## 2023-08-15 RX ADMIN — AMIODARONE HYDROCHLORIDE 200 MG: 200 TABLET ORAL at 10:40

## 2023-08-15 ASSESSMENT — PAIN SCALES - GENERAL
PAINLEVEL_OUTOF10: 7
PAINLEVEL_OUTOF10: 0

## 2023-08-15 ASSESSMENT — PAIN DESCRIPTION - ORIENTATION: ORIENTATION: RIGHT;LEFT

## 2023-08-15 ASSESSMENT — PAIN DESCRIPTION - LOCATION: LOCATION: FOOT

## 2023-08-15 ASSESSMENT — PAIN DESCRIPTION - DESCRIPTORS: DESCRIPTORS: ACHING;SHARP

## 2023-08-15 NOTE — CONSULTS
Pharmacy to Manage Heparin Infusion per [facility]    Dx:ACS  Pt wt = 123.1 kg - adjusted. Baseline aPTT = Pending. Oral factor Xa-inhibitors may alter and elevate anti-Xa levels used for unfractionated heparin monitoring. As a result, anti-Xa monitoring is not accurate while Xa-inhibitor activity is detectable. Utilize aPTT monitoring when patient received an oral factor Xa-inhibitor (apixaban, betrixaban, edoxaban or rivaroxaban) within 72 hours prior to admission (please document last administration time). The goal is to allow a washout of oral factor Xa-inhibitors by using aPTT for 72 hours, then change to ant-Xa levels for UFH. Heparin (weight-based) Infusion: CAD/STEMI/NSTEMI/UA/AFib)   Heparin 60 units/kg IVP bolus (max 4,000 units) followed by Heparin infusion at 12 units/kg/hr (recommended max initial rate: 1000 units/hr). Recheck anti-Xa (unless aPTT being used) in 6 hours. Goal anti-Xa 0.3-0.7 IU/mL  Goal aPTT =  seconds.   Naa Moctezuma, Leona  8/15/2023 3:23 AM

## 2023-08-15 NOTE — PROGRESS NOTES
Inpatient Physical Therapy Evaluation & Treatment    Unit: PCU  Date:  8/15/2023  Patient Name:    Carlos Diaz  Admitting diagnosis:  Right ankle pain, unspecified chronicity [M25.571]  Ankle pain, unspecified chronicity, unspecified laterality [M25.579]  Admit Date:  8/14/2023  Precautions/Restrictions/WB Status/ Lines/ Wounds/ Oxygen: Fall risk, Lines (IV), and Telemetry    Treatment Time:  855-940  Treatment Number:  1   Timed Code Treatment Minutes: 35 minutes  Total Treatment Minutes:  45  minutes    Patient Stated Goals for Therapy: \" to return home \"          Discharge Recommendations: SNF  DME needs for discharge: Defer to facility       Therapy recommendation for EMS Transport: requires transport by cot due to pt needs lift equipment for safe transfers    Therapy recommendations for staff:   Assist of 1 for sitting EOB; not OOB yet d/t pain    History of Present Illness:   Per H&P, \"The patient is a 68 y.o. male with PMHx CAD s/p CABG x2, HTN, chronic diastolic CHF, N0VH, asthma, JAKE, and morbid obesity who presented to [facility] ED with complaint of ankle pain. Patient reports relatively acute onset right ankle pain beginning Saturday when he stepped out of bed. Did not hear any cracks or pops but had pain immediately afterwards and has been unable to bear weight on it since. Pain has progressively gotten worse. He has also noticed redness and swelling. No fever, chills. No hx of gout or blood clots. On further evaluation, patient also reports he had episode of chest pain yesterday after walking from bed to chair. Midsternal, did not radiate anywhere, lasted about 15 minutes and then resolved on its own. Heaviness sensation     Home Health S4 Level Recommendation:  NA    AM-PAC Mobility Score    AM-PAC Inpatient Mobility Raw Score : 10       Subjective  Patient lying reclined in bed with family present (spouse, daughter, son and son in law). Pt agreeable to this PT session.      Cognition    A&O x4

## 2023-08-15 NOTE — PROGRESS NOTES
Inpatient Occupational Therapy Evaluation and Treatment    Unit: PCU  Date:  8/15/2023  Patient Name:    Claudette Libel  Admitting diagnosis:  Right ankle pain, unspecified chronicity [M25.571]  Ankle pain, unspecified chronicity, unspecified laterality [M25.579]  Admit Date:  8/14/2023  Precautions/Restrictions/WB Status/ Lines/ Wounds/ Oxygen: Fall risk, Bed/chair alarm, Lines (IV), and Telemetry-reports having glaucoma and decreased vision    Treatment Time:  557-850  Treatment Number:  1  Timed Code Treatment Minutes: 35 minutes  Total Treatment Minutes:  45  minutes    Patient Goals for Therapy: \"go home\"          Discharge Recommendations: SNF  DME needs for discharge: Defer to facility       Therapy recommendations for staff:   Assist of 1 for sitting EOB-declining OOB due to pain in B feet    History of Present Illness: per KANDIS TERRELL H&P 8/14/23:  \"The patient is a 68 y.o. male with PMHx CAD s/p CABG x2, HTN, chronic diastolic CHF, I4JT, asthma, JAKE, and morbid obesity who presented to [facility] ED with complaint of ankle pain. Patient reports relatively acute onset right ankle pain beginning Saturday when he stepped out of bed. Did not hear any cracks or pops but had pain immediately afterwards and has been unable to bear weight on it since. Pain has progressively gotten worse. He has also noticed redness and swelling. No fever, chills. No hx of gout or blood clots. On further evaluation, patient also reports he had episode of chest pain yesterday after walking from bed to chair. Midsternal, did not radiate anywhere, lasted about 15 minutes and then resolved on its own. Heaviness sensation\"     Home Health S4 Level Recommendation:  NA    AM-PAC Score: AM-PAC Inpatient Daily Activity Raw Score: 15     Subjective:  Patient lying reclined in bed with family present (dtr, spouse, son, son in law). Pt agreeable to this OT session.      Cognition:    A&O Person, Place, Time, and Situation   Able to follow 2 A  Grooming       Min A  Upper Body Dressing:      Min A  Lower Body Dressing:      Min A  Pt to demonstrate UE therapeutic exs x 15 reps with minimal cues    Rehabilitation Potential: Fair  Strengths for achieving goals include: Family Support   Barriers to achieving goals include:  Complexity of condition and Pain    Plan: To be seen 3-5 x/wk while in acute care setting for therapeutic exercises, bed mobility, transfers, family/patient education, ADL/IADL retraining, and energy conservation training.     Electronically signed by Manoj Vargas OT on 8/15/2023 at 12:29 PM      If patient discharges from this facility prior to next visit, this note will serve as the Discharge Summary

## 2023-08-15 NOTE — FLOWSHEET NOTE
08/14/23 2135   Vital Signs   Pulse 64   BP (!) 164/73   MAP (Calculated) 103     Pt awake and alert in bed. Vitals obtained. Oriented times 4. Shift assessment completed, see flow sheet. Pt c/o pain 9/10 in R foot and ankle, PRN norco given. PRN Glycolax given for constipation Scheduled meds given, see MAR. Pt denies any further needs at this time. Call light in reach.

## 2023-08-15 NOTE — DISCHARGE INSTRUCTIONS
Change ASA to 81 mg   Start on eliquis 5 mg bid  Monitor for bleeding  Adjust insulin to keep sugars less than 160    Follow up with cardiology in 1- 2 weeks  -------------------------------------------------------------------------------------------         Gout: Care Instructions  Overview     Gout is a form of arthritis caused by a buildup of uric acid crystals in a joint. It causes sudden attacks of pain, swelling, redness, and stiffness, usually in one joint, especially the big toe. Gout usually comes on without a cause. But it can be brought on by drinking alcohol (especially beer), eating or drinking things made with high-fructose corn syrup, or eating seafood or red meat. Taking certain medicines, such as diuretics, can also trigger an attack of gout. Taking your medicines as prescribed and following up with your doctor regularly can help you avoid gout attacks in the future. Follow-up care is a key part of your treatment and safety. Be sure to make and go to all appointments, and call your doctor if you are having problems. It's also a good idea to know your test results and keep a list of the medicines you take. How can you care for yourself at home? If the joint is swollen, put ice or a cold pack on the area for 10 to 20 minutes at a time. Put a thin cloth between the ice and your skin. Prop up the sore limb on a pillow when you ice it or anytime you sit or lie down during the next 3 days. Try to keep it above the level of your heart. This can help reduce swelling. Rest sore joints. Avoid activities that put weight or strain on the joints for a few days. Take short rest breaks from your regular activities during the day. Take your medicines exactly as prescribed. Call your doctor if you think you are having a problem with your medicine. Take pain medicines exactly as directed. If the doctor gave you a prescription medicine for pain, take it as prescribed.   If you are not taking a

## 2023-08-15 NOTE — CARE COORDINATION
Case Management Assessment  Initial Evaluation    Date/Time of Evaluation: 8/15/2023 2:30 PM  Assessment Completed by: Shweta Byrd RN    If patient is discharged prior to next notation, then this note serves as note for discharge by case management. Patient Name: Adrienne Wharton                   YOB: 1950  Diagnosis: Right ankle pain, unspecified chronicity [M25.571]  Ankle pain, unspecified chronicity, unspecified laterality [M25.579]                   Date / Time: 8/14/2023 11:15 AM    Patient Admission Status: Inpatient   Readmission Risk (Low < 19, Mod (19-27), High > 27): Readmission Risk Score: 12.4    Current PCP: Gina Cho MD  PCP verified by ? Yes Evangelina Fields)    Chart Reviewed: Yes      History Provided by: Patient  Patient Orientation: Alert and Oriented    Patient Cognition: Alert    Hospitalization in the last 30 days (Readmission):  No    If yes, Readmission Assessment in  Navigator will be completed. Advance Directives:      Code Status: DNR-CCA   Patient's Primary Decision Maker is: Named in Scanned ACP Document      Discharge Planning:    Patient lives with: Spouse/Significant Other Type of Home: House  Primary Care Giver: Self  Patient Support Systems include: Spouse/Significant Other, Children, Family Members   Current Financial resources: Medicare  Current community resources: None  Current services prior to admission: C-pap, Durable Medical Equipment            Current DME: Rushie Hutching, Shower Chair (mobile scooter)            Type of Home Care services:  None    ADLS  Prior functional level: Assistance with the following:, Bathing  Current functional level: Assistance with the following:, Bathing    PT AM-PAC: 10 /24  OT AM-PAC: 15 /24    Family can provide assistance at DC: Yes  Would you like Case Management to discuss the discharge plan with any other family members/significant others, and if so, who?  No  Plans to Return to Present Housing:

## 2023-08-15 NOTE — PROGRESS NOTES
8/15  Anti-Xa = < 0.1 at 0231. Give heparin bolus of 4000 units and increase heparin gtt to 12.1 units/kg/hr. Recheck Anti-Xa in 6 hours.   Katerine Figueredo PharmD  8/15/2023 3:24 AM

## 2023-08-15 NOTE — PLAN OF CARE
HEART FAILURE CARE PLAN:    Comorbidities Reviewed: Yes   Patient has a past medical history of Arthritis, Asthma, BiPAP (biphasic positive airway pressure) dependence, CHF (congestive heart failure) ([address]), Colon cancer ([address]), CPAP (continuous positive airway pressure) dependence, Depression, Diabetes mellitus ([address]), MRSA (methicillin resistant staph aureus) culture positive, PE (pulmonary embolism), Sleep apnea, and Vitamin D deficiency. ECHOCARDIOGRAM Reviewed: Yes   Patient's Ejection Fraction (EF) is greater than 40%    Weights Reviewed: Yes   Admission weight: (!) 460 lb (208.7 kg)   Wt Readings from Last 3 Encounters:   08/14/23 (!) 460 lb (208.7 kg)   06/23/22 (!) 460 lb (208.7 kg)   06/23/21 (!) 460 lb 12.8 oz (209 kg)     Intake & Output Reviewed: Yes     Intake/Output Summary (Last 24 hours) at 8/14/2023 2335  Last data filed at 8/14/2023 1819  Gross per 24 hour   Intake --   Output 150 ml   Net -150 ml     Medications Reviewed: Yes   SCHEDULED HOSPITAL MEDICATIONS:   sodium chloride flush  5-40 mL IntraVENous 2 times per day    colchicine  0.6 mg Oral BID    [START ON 8/15/2023] aspirin  325 mg Oral Daily    atorvastatin  20 mg Oral Nightly    [START ON 8/15/2023] furosemide  80 mg Oral Daily    latanoprost  1 drop Both Eyes Nightly    metoprolol tartrate  50 mg Oral BID    timolol  1 drop Both Eyes BID    [START ON 8/15/2023] insulin lispro  0-16 Units SubCUTAneous TID WC    insulin lispro  0-4 Units SubCUTAneous Nightly     ACE/ARB/ARNI is REQUIRED for EF </= 23% SYSTOLIC FAILURE:   ACE[de-identified] None  ARB[de-identified] None  ARNI[de-identified] None    Evidenced-Based Beta Blocker is REQUIRED for EF </= 81% SYSTOLIC FAILURE:   [de-identified] Metoprolol SUCCinate- Toprol XL    Diuretics:  [de-identified] Furosemide    Diet Reviewed: Yes   Diet NPO  ADULT DIET;  Regular; 4 carb choices (60 gm/meal)    Goal of Care Reviewed: Yes   Patient and/or Family's stated Goal of Care this Admission: reduce shortness of breath, increase activity tolerance, better

## 2023-08-15 NOTE — ACP (ADVANCE CARE PLANNING)
Advance Care Planning     General Advance Care Planning (ACP) Conversation    Date of Conversation: 8/14/2023  Conducted with: Patient with Decision Making Capacity    Healthcare Decision Maker:  No healthcare decision makers have been documented. Click here to complete [address] including selection of the Healthcare Decision Maker Relationship (ie \"Primary\")   Today we documented Decision Maker(s) consistent with ACP documents on file. Content/Action Overview:   Has ACP document(s) on file - reflects the patient's care preferences  Reviewed DNR/DNI and patient confirms current DNR status - completed forms on file (place new order if needed)        Length of Voluntary ACP Conversation in minutes:  <16 minutes (Non-Billable)    Anthony Pulido RN

## 2023-08-15 NOTE — CONSULTS
[address] [place] [address]  [phone]        Reason for Consultation/Chief Complaint: \"I have been asked to see him for  Abnormal EKG Shortness of breath and elevated troponin. \"    History of Present Illness:  Krista Graf is a 68 y.o. patient who presented to the hospital with complaints of shortness of breath right ankle pain. He had substernal chest pain on Sunday 8.13.23 when family try to get him up in chair. Seen by Dr Juan Luis Myles last note of 6.23.22 as follows CAD s/p LAD DELFINO 3/15 and 2V CABG 5/17, DM, JAKE on CPAP, history of PE, colon cancer s/p surgery, and OA. Admitted to [facility] [place] 4/25/2017 with acute SOB and ischemic EKG changes. Underwent LHC by Dr. Channing Goetz 4/27/17 with MV CAD. Underwent s/p 2V CABG 5/2/17 by Dr. Sandy Montague. He developed A fib/Aflutter post-op now resolved. Subsequently also noted to be in parox afib. He has been on treatment for hyperlipidemia HBP   Chronic d-CHF, CAD CABG x2 5.2.17, DM PE, Sleep apnea. Postop atrial flutter, PAF     I have been asked to provide consultation regarding further management and testing. Past Medical History:   has a past medical history of Arthritis, Asthma, BiPAP (biphasic positive airway pressure) dependence, CHF (congestive heart failure) ([address]), Colon cancer ([address]), CPAP (continuous positive airway pressure) dependence, Depression, Diabetes mellitus ([address]), MRSA (methicillin resistant staph aureus) culture positive, PE (pulmonary embolism), Sleep apnea, and Vitamin D deficiency. Surgical History:   has a past surgical history that includes Knee Arthroplasty (Left); Colon surgery; hernia repair; Coronary angioplasty with stent (03/04/2015); Total shoulder arthroplasty (Right); Cardiac catheterization (04/27/2017); and Coronary artery bypass graft (05/02/2017). Social History:   reports that he quit smoking about 9 years ago. His smoking use included cigars and cigarettes. He has a 1.25 pack-year smoking history.  He has never used smokeless tobacco. He reports current alcohol use. He reports current drug use. Drug: Marijuana Leighton Mater). Family History:  family history includes Heart Disease in his brother and mother. Home Medications:  Were reviewed and are listed in nursing record. and/or listed below  Prior to Admission medications    Medication Sig Start Date End Date Taking? Authorizing Provider   furosemide (LASIX) 80 MG tablet Take 1 tablet by mouth daily 6/23/21   Isai Villarreal MD   potassium chloride (KLOR-CON M) 20 MEQ extended release tablet Take 1 tablet by mouth daily 6/23/21   Isai Villarreal MD   Semaglutide (OZEMPIC, 1 MG/DOSE, SC) Inject 1 mg into the skin once a week    Historical Provider, MD   insulin aspart (NOVOLOG) 100 UNIT/ML injection vial Inject 50 Units into the skin 2 times daily    Historical Provider, MD   insulin 70-30 (HUMULIN;NOVOLIN) (70-30) 100 UNIT per ML injection vial Inject 35 Units into the skin every morning 35 in am & pm    Historical Provider, MD   Misc.  Devices (NOVA BATH SEAT) MISC by Does not apply route    Historical Provider, MD   timolol (BETIMOL) 0.5 % ophthalmic solution 1 drop 2 times daily    Historical Provider, MD   latanoprost (XALATAN) 0.005 % ophthalmic solution 1 drop nightly    Historical Provider, MD   aspirin 325 MG EC tablet Take 1 tablet by mouth daily 9/20/17   Carson Santiago MD   atorvastatin (LIPITOR) 20 MG tablet Take 1 tablet by mouth at bedtime    Historical Provider, MD   metoprolol tartrate (LOPRESSOR) 50 MG tablet Take 1 tablet by mouth 2 times daily 5/9/17   Jessica Villalta APRN - CNP   metFORMIN (GLUCOPHAGE) 1000 MG tablet Take 1 tablet by mouth 2 times daily (with meals) 4/2/17   Historical Provider, MD        Allergies:  Stadol [butorphanol tartrate]     Review of Systems:   12 point ROS negative in all areas as listed below except as in [place]  Constitutional, EENT, GI, , Musculoskeletal, skin, neurological, hematological, endocrine,

## 2023-08-16 PROBLEM — I50.32 CHRONIC DIASTOLIC HF (HEART FAILURE) (HCC): Status: ACTIVE | Noted: 2023-08-16

## 2023-08-16 PROBLEM — I48.91 ATRIAL FIBRILLATION AND FLUTTER (HCC): Status: ACTIVE | Noted: 2023-08-16

## 2023-08-16 PROBLEM — I25.10 CAD IN NATIVE ARTERY: Status: ACTIVE | Noted: 2023-08-16

## 2023-08-16 PROBLEM — I48.92 ATRIAL FIBRILLATION AND FLUTTER (HCC): Status: ACTIVE | Noted: 2023-08-16

## 2023-08-16 LAB
ANION GAP SERPL CALCULATED.3IONS-SCNC: 14 MMOL/L (ref 3–16)
BASOPHILS # BLD: 0 K/UL (ref 0–0.2)
BASOPHILS NFR BLD: 0.3 %
BUN SERPL-MCNC: 18 MG/DL (ref 7–20)
CALCIUM SERPL-MCNC: 8.6 MG/DL (ref 8.3–10.6)
CHLORIDE SERPL-SCNC: 95 MMOL/L (ref 99–110)
CO2 SERPL-SCNC: 25 MMOL/L (ref 21–32)
CREAT SERPL-MCNC: 0.8 MG/DL (ref 0.8–1.3)
DEPRECATED RDW RBC AUTO: 15.5 % (ref 12.4–15.4)
EOSINOPHIL # BLD: 0 K/UL (ref 0–0.6)
EOSINOPHIL NFR BLD: 0.1 %
GFR SERPLBLD CREATININE-BSD FMLA CKD-EPI: >60 ML/MIN/{1.73_M2}
GLUCOSE BLD-MCNC: 282 MG/DL (ref 70–99)
GLUCOSE BLD-MCNC: 313 MG/DL (ref 70–99)
GLUCOSE BLD-MCNC: 347 MG/DL (ref 70–99)
GLUCOSE BLD-MCNC: 356 MG/DL (ref 70–99)
GLUCOSE SERPL-MCNC: 286 MG/DL (ref 70–99)
HCT VFR BLD AUTO: 35 % (ref 40.5–52.5)
HGB BLD-MCNC: 11.4 G/DL (ref 13.5–17.5)
LYMPHOCYTES # BLD: 0.8 K/UL (ref 1–5.1)
LYMPHOCYTES NFR BLD: 11.8 %
MCH RBC QN AUTO: 28 PG (ref 26–34)
MCHC RBC AUTO-ENTMCNC: 32.7 G/DL (ref 31–36)
MCV RBC AUTO: 85.7 FL (ref 80–100)
MONOCYTES # BLD: 0.3 K/UL (ref 0–1.3)
MONOCYTES NFR BLD: 4.2 %
NEUTROPHILS # BLD: 5.9 K/UL (ref 1.7–7.7)
NEUTROPHILS NFR BLD: 83.6 %
PERFORMED ON: ABNORMAL
PLATELET # BLD AUTO: 192 K/UL (ref 135–450)
PMV BLD AUTO: 7.6 FL (ref 5–10.5)
POTASSIUM SERPL-SCNC: 4.3 MMOL/L (ref 3.5–5.1)
RBC # BLD AUTO: 4.09 M/UL (ref 4.2–5.9)
SODIUM SERPL-SCNC: 134 MMOL/L (ref 136–145)
WBC # BLD AUTO: 7.1 K/UL (ref 4–11)

## 2023-08-16 PROCEDURE — 99233 SBSQ HOSP IP/OBS HIGH 50: CPT | Performed by: INTERNAL MEDICINE

## 2023-08-16 PROCEDURE — 6360000002 HC RX W HCPCS: Performed by: INTERNAL MEDICINE

## 2023-08-16 PROCEDURE — 6370000000 HC RX 637 (ALT 250 FOR IP): Performed by: INTERNAL MEDICINE

## 2023-08-16 PROCEDURE — 6370000000 HC RX 637 (ALT 250 FOR IP): Performed by: NURSE PRACTITIONER

## 2023-08-16 PROCEDURE — 2060000000 HC ICU INTERMEDIATE R&B

## 2023-08-16 PROCEDURE — 80048 BASIC METABOLIC PNL TOTAL CA: CPT

## 2023-08-16 PROCEDURE — 2580000003 HC RX 258

## 2023-08-16 PROCEDURE — 6370000000 HC RX 637 (ALT 250 FOR IP)

## 2023-08-16 PROCEDURE — 36415 COLL VENOUS BLD VENIPUNCTURE: CPT

## 2023-08-16 PROCEDURE — 85025 COMPLETE CBC W/AUTO DIFF WBC: CPT

## 2023-08-16 PROCEDURE — 99232 SBSQ HOSP IP/OBS MODERATE 35: CPT | Performed by: NURSE PRACTITIONER

## 2023-08-16 PROCEDURE — 2580000003 HC RX 258: Performed by: INTERNAL MEDICINE

## 2023-08-16 RX ORDER — INSULIN GLARGINE 100 [IU]/ML
15 INJECTION, SOLUTION SUBCUTANEOUS ONCE
Status: COMPLETED | OUTPATIENT
Start: 2023-08-16 | End: 2023-08-16

## 2023-08-16 RX ORDER — TORSEMIDE 20 MG/1
20 TABLET ORAL 2 TIMES DAILY
Status: DISCONTINUED | OUTPATIENT
Start: 2023-08-16 | End: 2023-08-17 | Stop reason: HOSPADM

## 2023-08-16 RX ORDER — FUROSEMIDE 10 MG/ML
40 INJECTION INTRAMUSCULAR; INTRAVENOUS DAILY
Status: DISCONTINUED | OUTPATIENT
Start: 2023-08-17 | End: 2023-08-16

## 2023-08-16 RX ORDER — ASPIRIN 81 MG/1
81 TABLET ORAL DAILY
Status: DISCONTINUED | OUTPATIENT
Start: 2023-08-16 | End: 2023-08-17 | Stop reason: HOSPADM

## 2023-08-16 RX ADMIN — FUROSEMIDE 40 MG: 10 INJECTION, SOLUTION INTRAMUSCULAR; INTRAVENOUS at 08:40

## 2023-08-16 RX ADMIN — ASPIRIN 81 MG: 81 TABLET, COATED ORAL at 15:38

## 2023-08-16 RX ADMIN — TIMOLOL MALEATE 1 DROP: 5 SOLUTION OPHTHALMIC at 08:42

## 2023-08-16 RX ADMIN — INSULIN LISPRO 8 UNITS: 100 INJECTION, SOLUTION INTRAVENOUS; SUBCUTANEOUS at 11:40

## 2023-08-16 RX ADMIN — APIXABAN 5 MG: 5 TABLET, FILM COATED ORAL at 08:40

## 2023-08-16 RX ADMIN — INSULIN LISPRO 4 UNITS: 100 INJECTION, SOLUTION INTRAVENOUS; SUBCUTANEOUS at 20:10

## 2023-08-16 RX ADMIN — APIXABAN 5 MG: 5 TABLET, FILM COATED ORAL at 20:11

## 2023-08-16 RX ADMIN — LATANOPROST 1 DROP: 50 SOLUTION OPHTHALMIC at 20:12

## 2023-08-16 RX ADMIN — Medication 10 ML: at 08:41

## 2023-08-16 RX ADMIN — AMIODARONE HYDROCHLORIDE 200 MG: 200 TABLET ORAL at 08:40

## 2023-08-16 RX ADMIN — INSULIN LISPRO 12 UNITS: 100 INJECTION, SOLUTION INTRAVENOUS; SUBCUTANEOUS at 08:39

## 2023-08-16 RX ADMIN — TORSEMIDE 20 MG: 20 TABLET ORAL at 20:11

## 2023-08-16 RX ADMIN — INSULIN GLARGINE 15 UNITS: 100 INJECTION, SOLUTION SUBCUTANEOUS at 08:40

## 2023-08-16 RX ADMIN — ATORVASTATIN CALCIUM 20 MG: 10 TABLET, FILM COATED ORAL at 20:10

## 2023-08-16 RX ADMIN — AMIODARONE HYDROCHLORIDE 200 MG: 200 TABLET ORAL at 20:10

## 2023-08-16 RX ADMIN — COLCHICINE 0.6 MG: 0.6 CAPSULE ORAL at 20:11

## 2023-08-16 RX ADMIN — COLCHICINE 0.6 MG: 0.6 CAPSULE ORAL at 08:41

## 2023-08-16 RX ADMIN — INSULIN LISPRO 12 UNITS: 100 INJECTION, SOLUTION INTRAVENOUS; SUBCUTANEOUS at 17:00

## 2023-08-16 RX ADMIN — Medication 10 ML: at 20:10

## 2023-08-16 RX ADMIN — TIMOLOL MALEATE 1 DROP: 5 SOLUTION OPHTHALMIC at 20:55

## 2023-08-16 RX ADMIN — METOPROLOL TARTRATE 50 MG: 50 TABLET, FILM COATED ORAL at 20:10

## 2023-08-16 RX ADMIN — METHYLPREDNISOLONE SODIUM SUCCINATE 40 MG: 40 INJECTION INTRAMUSCULAR; INTRAVENOUS at 08:40

## 2023-08-16 RX ADMIN — POLYETHYLENE GLYCOL (3350) 17 G: 17 POWDER, FOR SOLUTION ORAL at 08:39

## 2023-08-16 ASSESSMENT — PAIN SCALES - GENERAL
PAINLEVEL_OUTOF10: 0
PAINLEVEL_OUTOF10: 0

## 2023-08-16 NOTE — PROGRESS NOTES
Patient transferred over to bariatric bed. Patient states he is much more comfortable. Patient also placed on home CPAP machine. No further needs at this time.      Marcella Chaves, RN

## 2023-08-16 NOTE — PLAN OF CARE
HEART FAILURE CARE PLAN:    Comorbidities Reviewed: Yes   Patient has a past medical history of Arthritis, Asthma, BiPAP (biphasic positive airway pressure) dependence, CHF (congestive heart failure) ([address]), Colon cancer ([address]), CPAP (continuous positive airway pressure) dependence, Depression, Diabetes mellitus ([address]), MRSA (methicillin resistant staph aureus) culture positive, PE (pulmonary embolism), Sleep apnea, and Vitamin D deficiency. ECHOCARDIOGRAM Reviewed: Yes   Patient's Ejection Fraction (EF) is greater than 40%    Weights Reviewed: Yes   Admission weight: (!) 460 lb (208.7 kg)   Wt Readings from Last 3 Encounters:   08/15/23 (!) 452 lb (205 kg)   06/23/22 (!) 460 lb (208.7 kg)   06/23/21 (!) 460 lb 12.8 oz (209 kg)     Intake & Output Reviewed: Yes     Intake/Output Summary (Last 24 hours) at 8/15/2023 2307  Last data filed at 8/15/2023 1914  Gross per 24 hour   Intake 222 ml   Output 1325 ml   Net -1103 ml     Medications Reviewed: Yes   SCHEDULED HOSPITAL MEDICATIONS:   apixaban  5 mg Oral BID    furosemide  40 mg IntraVENous BID    amiodarone  200 mg Oral BID    methylPREDNISolone  40 mg IntraVENous Daily    sodium chloride flush  5-40 mL IntraVENous 2 times per day    colchicine  0.6 mg Oral BID    atorvastatin  20 mg Oral Nightly    latanoprost  1 drop Both Eyes Nightly    metoprolol tartrate  50 mg Oral BID    timolol  1 drop Both Eyes BID    insulin lispro  0-16 Units SubCUTAneous TID WC    insulin lispro  0-4 Units SubCUTAneous Nightly     ACE/ARB/ARNI is REQUIRED for EF </= 62% SYSTOLIC FAILURE:   ACE[de-identified] None  ARB[de-identified] None  ARNI[de-identified] None    Evidenced-Based Beta Blocker is REQUIRED for EF </= 53% SYSTOLIC FAILURE:   [de-identified] Metoprolol SUCCinate- Toprol XL    Diuretics:  [de-identified] Furosemide    Diet Reviewed: Yes   ADULT DIET;  Regular    Goal of Care Reviewed: Yes   Patient and/or Family's stated Goal of Care this Admission: reduce shortness of breath, increase activity tolerance, better understand heart failure

## 2023-08-16 NOTE — CARE COORDINATION
INTERDISCIPLINARY PLAN OF CARE CONFERENCE    Date/Time: 8/16/2023 3:22 PM  Completed by: Yelitza Vargas RN, Case Management      Patient Name:  Dana Escalona  YOB: 1950  Admitting Diagnosis: Right ankle pain, unspecified chronicity [M25.571]  Ankle pain, unspecified chronicity, unspecified laterality [M25.579]     Admit Date/Time:  8/14/2023 11:15 AM    Chart reviewed. Interdisciplinary team contacted or reviewed plan related to patient progress and discharge plans. Disciplines included Case Management, Nursing, and Dietitian. Current Status:ip  PT/OT recommendation for discharge plan of care: SNF    Expected D/C Disposition:  Home  Confirmed plan with patient and/or family Yes confirmed with: (name) Paula Card    Met with:pt  Discharge Plan Comments: met with pt at bedside. Pt is agreeable to current DC plan. Pt states he is OK to go home with therapy but does not want to go to SNF.  Spoke to Radha at [facility] who is arranging home care for pt via his [place] benefits

## 2023-08-16 NOTE — PROGRESS NOTES
Bedside report and transfer of care given to Antonina Tavera, [address] [place] [address]. Pt currently resting in bed with the call light within reach. Pt denies any other care needs at this time. Pt stable at this time.     Marcella Chaves, JULISSA

## 2023-08-17 VITALS
OXYGEN SATURATION: 92 % | SYSTOLIC BLOOD PRESSURE: 128 MMHG | TEMPERATURE: 97 F | RESPIRATION RATE: 18 BRPM | HEART RATE: 54 BPM | DIASTOLIC BLOOD PRESSURE: 66 MMHG | BODY MASS INDEX: 49.44 KG/M2 | HEIGHT: 67 IN | WEIGHT: 315 LBS

## 2023-08-17 LAB
ANION GAP SERPL CALCULATED.3IONS-SCNC: 11 MMOL/L (ref 3–16)
BASOPHILS # BLD: 0 K/UL (ref 0–0.2)
BASOPHILS NFR BLD: 0.5 %
BUN SERPL-MCNC: 23 MG/DL (ref 7–20)
CALCIUM SERPL-MCNC: 8.6 MG/DL (ref 8.3–10.6)
CHLORIDE SERPL-SCNC: 95 MMOL/L (ref 99–110)
CO2 SERPL-SCNC: 28 MMOL/L (ref 21–32)
CREAT SERPL-MCNC: 0.9 MG/DL (ref 0.8–1.3)
DEPRECATED RDW RBC AUTO: 14.9 % (ref 12.4–15.4)
EOSINOPHIL # BLD: 0 K/UL (ref 0–0.6)
EOSINOPHIL NFR BLD: 0.4 %
GFR SERPLBLD CREATININE-BSD FMLA CKD-EPI: >60 ML/MIN/{1.73_M2}
GLUCOSE BLD-MCNC: 258 MG/DL (ref 70–99)
GLUCOSE BLD-MCNC: 287 MG/DL (ref 70–99)
GLUCOSE BLD-MCNC: 312 MG/DL (ref 70–99)
GLUCOSE SERPL-MCNC: 292 MG/DL (ref 70–99)
HCT VFR BLD AUTO: 34.3 % (ref 40.5–52.5)
HGB BLD-MCNC: 11.4 G/DL (ref 13.5–17.5)
LYMPHOCYTES # BLD: 1.4 K/UL (ref 1–5.1)
LYMPHOCYTES NFR BLD: 16.2 %
MCH RBC QN AUTO: 28.2 PG (ref 26–34)
MCHC RBC AUTO-ENTMCNC: 33.2 G/DL (ref 31–36)
MCV RBC AUTO: 84.9 FL (ref 80–100)
MONOCYTES # BLD: 0.7 K/UL (ref 0–1.3)
MONOCYTES NFR BLD: 8.4 %
NEUTROPHILS # BLD: 6.6 K/UL (ref 1.7–7.7)
NEUTROPHILS NFR BLD: 74.5 %
PERFORMED ON: ABNORMAL
PLATELET # BLD AUTO: 214 K/UL (ref 135–450)
PMV BLD AUTO: 7.5 FL (ref 5–10.5)
POTASSIUM SERPL-SCNC: 4 MMOL/L (ref 3.5–5.1)
RBC # BLD AUTO: 4.04 M/UL (ref 4.2–5.9)
SODIUM SERPL-SCNC: 134 MMOL/L (ref 136–145)
WBC # BLD AUTO: 8.9 K/UL (ref 4–11)

## 2023-08-17 PROCEDURE — 99239 HOSP IP/OBS DSCHRG MGMT >30: CPT | Performed by: INTERNAL MEDICINE

## 2023-08-17 PROCEDURE — 6370000000 HC RX 637 (ALT 250 FOR IP): Performed by: NURSE PRACTITIONER

## 2023-08-17 PROCEDURE — 6370000000 HC RX 637 (ALT 250 FOR IP): Performed by: STUDENT IN AN ORGANIZED HEALTH CARE EDUCATION/TRAINING PROGRAM

## 2023-08-17 PROCEDURE — 6360000002 HC RX W HCPCS: Performed by: INTERNAL MEDICINE

## 2023-08-17 PROCEDURE — 85025 COMPLETE CBC W/AUTO DIFF WBC: CPT

## 2023-08-17 PROCEDURE — 6370000000 HC RX 637 (ALT 250 FOR IP): Performed by: INTERNAL MEDICINE

## 2023-08-17 PROCEDURE — 80048 BASIC METABOLIC PNL TOTAL CA: CPT

## 2023-08-17 PROCEDURE — 2580000003 HC RX 258

## 2023-08-17 PROCEDURE — 6370000000 HC RX 637 (ALT 250 FOR IP)

## 2023-08-17 PROCEDURE — 99232 SBSQ HOSP IP/OBS MODERATE 35: CPT | Performed by: NURSE PRACTITIONER

## 2023-08-17 PROCEDURE — 2580000003 HC RX 258: Performed by: INTERNAL MEDICINE

## 2023-08-17 PROCEDURE — 36415 COLL VENOUS BLD VENIPUNCTURE: CPT

## 2023-08-17 RX ORDER — ASPIRIN 81 MG/1
81 TABLET ORAL DAILY
Qty: 30 TABLET | Refills: 3
Start: 2023-08-18

## 2023-08-17 RX ORDER — INSULIN GLARGINE 100 [IU]/ML
20 INJECTION, SOLUTION SUBCUTANEOUS ONCE
Status: COMPLETED | OUTPATIENT
Start: 2023-08-17 | End: 2023-08-17

## 2023-08-17 RX ORDER — METOPROLOL SUCCINATE 50 MG/1
50 TABLET, EXTENDED RELEASE ORAL DAILY
Status: DISCONTINUED | OUTPATIENT
Start: 2023-08-18 | End: 2023-08-17 | Stop reason: HOSPADM

## 2023-08-17 RX ORDER — TORSEMIDE 20 MG/1
20 TABLET ORAL 2 TIMES DAILY
Qty: 60 TABLET | Refills: 0 | Status: SHIPPED | OUTPATIENT
Start: 2023-08-17

## 2023-08-17 RX ORDER — AMIODARONE HYDROCHLORIDE 200 MG/1
TABLET ORAL
Qty: 60 TABLET | Refills: 0 | Status: SHIPPED | OUTPATIENT
Start: 2023-08-17

## 2023-08-17 RX ORDER — ALLOPURINOL 200 MG/1
200 TABLET ORAL DAILY
Qty: 30 TABLET | Refills: 2 | Status: SHIPPED | OUTPATIENT
Start: 2023-08-17

## 2023-08-17 RX ORDER — METOPROLOL SUCCINATE 50 MG/1
50 TABLET, EXTENDED RELEASE ORAL DAILY
Qty: 30 TABLET | Refills: 3 | Status: SHIPPED | OUTPATIENT
Start: 2023-08-18

## 2023-08-17 RX ORDER — INSULIN LISPRO 100 [IU]/ML
3 INJECTION, SOLUTION INTRAVENOUS; SUBCUTANEOUS ONCE
Status: COMPLETED | OUTPATIENT
Start: 2023-08-17 | End: 2023-08-17

## 2023-08-17 RX ADMIN — Medication 10 ML: at 08:27

## 2023-08-17 RX ADMIN — TIMOLOL MALEATE 1 DROP: 5 SOLUTION OPHTHALMIC at 08:31

## 2023-08-17 RX ADMIN — INSULIN LISPRO 3 UNITS: 100 INJECTION, SOLUTION INTRAVENOUS; SUBCUTANEOUS at 00:32

## 2023-08-17 RX ADMIN — APIXABAN 5 MG: 5 TABLET, FILM COATED ORAL at 08:26

## 2023-08-17 RX ADMIN — AMIODARONE HYDROCHLORIDE 200 MG: 200 TABLET ORAL at 08:26

## 2023-08-17 RX ADMIN — INSULIN LISPRO 8 UNITS: 100 INJECTION, SOLUTION INTRAVENOUS; SUBCUTANEOUS at 08:25

## 2023-08-17 RX ADMIN — TORSEMIDE 20 MG: 20 TABLET ORAL at 08:26

## 2023-08-17 RX ADMIN — ASPIRIN 81 MG: 81 TABLET, COATED ORAL at 08:26

## 2023-08-17 RX ADMIN — INSULIN LISPRO 8 UNITS: 100 INJECTION, SOLUTION INTRAVENOUS; SUBCUTANEOUS at 11:59

## 2023-08-17 RX ADMIN — INSULIN GLARGINE 20 UNITS: 100 INJECTION, SOLUTION SUBCUTANEOUS at 08:30

## 2023-08-17 RX ADMIN — COLCHICINE 0.6 MG: 0.6 CAPSULE ORAL at 08:26

## 2023-08-17 RX ADMIN — METOPROLOL TARTRATE 50 MG: 50 TABLET, FILM COATED ORAL at 08:26

## 2023-08-17 RX ADMIN — METHYLPREDNISOLONE SODIUM SUCCINATE 30 MG: 40 INJECTION INTRAMUSCULAR; INTRAVENOUS at 08:30

## 2023-08-17 ASSESSMENT — PAIN SCALES - GENERAL
PAINLEVEL_OUTOF10: 0
PAINLEVEL_OUTOF10: 0

## 2023-08-17 NOTE — FLOWSHEET NOTE
08/16/23 2006   Vital Signs   Pulse 64   Heart Rate Source Monitor   Respirations 17   BP (!) 161/73   MAP (Calculated) 102   BP Location Right lower arm   BP Method Automatic   Patient Position High fowlers   Level of Consciousness 0   Oxygen Therapy   SpO2 94 %   O2 Device None (Room air)     Vitals and assessment completed, no s/s of distress. Medications given per MAR. Male purewick applied to patient. No further needs at this time.      Glen Stewart RN

## 2023-08-17 NOTE — CARE COORDINATION
DISCHARGE ORDER  Date/Time 2023 10:17 AM  Completed by: Ryan Neri RN, Case Management    Patient Name: Li Soriano      : 1950  Admitting Diagnosis: Right ankle pain, unspecified chronicity [M25.571]  Ankle pain, unspecified chronicity, unspecified laterality [M25.579]      Admit order Date and Status:ip 23  (verify MD's last order for status of admission)      Noted discharge order. If applicable PT/OT recommendation at Discharge: SNF  DME recommendation by PT/OT:defer to Facility  Confirmed discharge plan  (): Yes  with whom_elliott______________  If pt confirmed DC plan does family need to be contacted by CM No       Discharge Plan: met with pt at bedside. Pt remains agreeable to current DC plan. Pt to DC home with family and [address] [place] [address] care. Family to transport pt home. No additional DME or DC needs identified. Pt continues to refuse SNF    Date of Last IMM Given: 23    Reviewed chart. Role of discharge planner explained and patient verbalized understanding. Discharge order is noted. Has Home O2 in place on admit:  No  Informed of need to bring portable home O2 tank on day of discharge for nursing to connect prior to leaving:   Not Indicated  Verbalized agreement/Understanding:   Not Indicated  Pt is being d/c'd to home today. Pt's O2 sats are 90% on RA. Discharge timeout done with rn, cm, pt. All discharge needs and concerns addressed.

## 2023-08-17 NOTE — PROGRESS NOTES
DC instructions have been reviewed with patient with a verbal understanding. IV has been removed with no complications. Patient has dressed and stated he has all of is belongings. Patient has been taken out by wheelchair.

## 2023-08-17 NOTE — PROGRESS NOTES
HEART FAILURE CARE PLAN:    Comorbidities Reviewed: Yes   Patient has a past medical history of Arthritis, Asthma, BiPAP (biphasic positive airway pressure) dependence, CHF (congestive heart failure) ([address]), Colon cancer ([address]), CPAP (continuous positive airway pressure) dependence, Depression, Diabetes mellitus ([address]), MRSA (methicillin resistant staph aureus) culture positive, PE (pulmonary embolism), Sleep apnea, and Vitamin D deficiency. ECHOCARDIOGRAM Reviewed: Yes   Patient's Ejection Fraction (EF) is greater than 40%    Weights Reviewed: Yes   Admission weight: (!) 460 lb (208.7 kg)   Wt Readings from Last 3 Encounters:   08/17/23 (!) 438 lb 0.9 oz (198.7 kg)   06/23/22 (!) 460 lb (208.7 kg)   06/23/21 (!) 460 lb 12.8 oz (209 kg)     Intake & Output Reviewed: Yes     Intake/Output Summary (Last 24 hours) at 8/17/2023 1451  Last data filed at 8/17/2023 1024  Gross per 24 hour   Intake 660 ml   Output 1000 ml   Net -340 ml     Medications Reviewed: Yes   SCHEDULED HOSPITAL MEDICATIONS:   methylPREDNISolone  30 mg IntraVENous Daily    [START ON 8/18/2023] metoprolol succinate  50 mg Oral Daily    aspirin  81 mg Oral Daily    torsemide  20 mg Oral BID    apixaban  5 mg Oral BID    amiodarone  200 mg Oral BID    sodium chloride flush  5-40 mL IntraVENous 2 times per day    colchicine  0.6 mg Oral BID    atorvastatin  20 mg Oral Nightly    latanoprost  1 drop Both Eyes Nightly    timolol  1 drop Both Eyes BID    insulin lispro  0-16 Units SubCUTAneous TID WC    insulin lispro  0-4 Units SubCUTAneous Nightly     ACE/ARB/ARNI is REQUIRED for EF </= 82% SYSTOLIC FAILURE:   ACE[de-identified] None  ARB[de-identified] None  ARNI[de-identified] None    Evidenced-Based Beta Blocker is REQUIRED for EF </= 64% SYSTOLIC FAILURE:   [de-identified] None    Diuretics:  [de-identified] Torsemide    Diet Reviewed: Yes   ADULT DIET;  Regular    Goal of Care Reviewed: Yes   Patient and/or Family's stated Goal of Care this Admission: reduce shortness of breath, increase activity tolerance, better

## 2023-08-17 NOTE — DISCHARGE SUMMARY
Name:  Mary Ann Stacy  Room:  /3318-46  MRN:    [id number]    IM Discharge Summary    Discharging Physician:  Vandana Braxton MD    Admit: 8/14/2023    Discharge:      PCP      Liv Wade MD    Diagnoses and hospital course  this Admission      #Right ankle pain possible acute gout flare vs osteoarthritis   -CT shows subcutaneous edema, no acute fracture  -ESR, CRP, uric acid level elevated significantly- suspect acute gout  -started colchicine BID ,  steroid with quick improvement  - will add allopurinol at dc  - low protein diet adviced  -start PT and recomemnded SNF but pt wished to go home as he is able to ambulate      #Chest pain  #Hx CAD, s/p CABG and stents   #Elevated troponin and EKG changes      -CXR negative  -EKG shows new left anterior fascicular block and new T wave inversions  -troponin mildly elevated at 32 --> 38  -cardiology consulted, started on heparin gtt, ASA, statins, BB  -ECHO with stable EF but severe LVH  -changed Asa to 81 mg given elqiuis use        #Atrial flutter  -rate controlled, changed home metoprolol to toprol, added amiodarone  -stable HR  -on heparin gtt > eliquis     #Chronic normocytic anemia  -H&H stable, monitor     #HTN  -stable, continue home regimen     #acute on Chronic diastolic CHF  - imaging neg, stable on RA  -appears well compensated today after diuresis , lowt 14 lbs with IV diuresis   -continue lasix      #JAKE on CPAP     #Asthma  -stable, continue prn inhalers      #T2DM  -resume home insulin with ssi         #Morbid Obesity, BMI 72.05  -counseled regarding diet modification and weight loss            HPI   68 y.o. male with PMHx CAD s/p CABG x2, HTN, chronic diastolic CHF, Z2SZ, asthma, JAKE, and morbid obesity who presented to [facility] ED with complaint of ankle pain. Patient reports relatively acute onset right ankle pain beginning Saturday when he stepped out of bed.  Did not hear any cracks or pops but had pain immediately afterwards and has been the dorsum of the foot. No focal fluid collections or evidence of soft tissue gas. XR CHEST PORTABLE   Final Result   No acute process. XR ANKLE RIGHT (MIN 3 VIEWS)   Final Result   No acute abnormality of the ankle. ECHO    Technically difficult examination secondary to habitus. Definity contrast   not used due to off axis apical views. Left ventricular systolic function appears normal with ejection fraction estimated at 50-55%. No obvious regional wall motion abnormalities. There is severe concentric left ventricular hypertrophy. Indeterminate left ventricular filling pressure. The right atrium is mildly dilated. Compared to exam done 5/16/2017, left ventricular systolic function remains unchanged. Aortic valve leaflets appear mildly thickened and calcified with restricted   opening. By Doppler no aortic stenosis. No aortic regurgitation.                   Recent Labs     08/15/23  0929 08/16/23  0434 08/17/23  0455   WBC 8.4 7.1 8.9   HGB 12.2* 11.4* 11.4*   HCT 37.6* 35.0* 34.3*   MCV 85.9 85.7 84.9    192 214       Recent Labs     08/15/23  0929 08/16/23 0434 08/17/23  0455    134* 134*   K 4.1 4.3 4.0   CL 99 95* 95*   CO2 25 25 28   BUN 19 18 23*   CREATININE 1.0 0.8 0.9       CBC:  Lab Results   Component Value Date/Time    WBC 8.9 08/17/2023 04:55 AM    HGB 11.4 08/17/2023 04:55 AM    HCT 34.3 08/17/2023 04:55 AM    MCV 84.9 08/17/2023 04:55 AM     08/17/2023 04:55 AM    NEUTOPHILPCT 74.5 08/17/2023 04:55 AM    LYMPHOPCT 16.2 08/17/2023 04:55 AM    MONOPCT 8.4 08/17/2023 04:55 AM    EOSPCT 2.1 02/09/2012 08:35 PM    BASOPCT 0.5 08/17/2023 04:55 AM    NEUTROABS 6.6 08/17/2023 04:55 AM    LYMPHSABS 1.4 08/17/2023 04:55 AM    MONOSABS 0.7 08/17/2023 04:55 AM    EOSABS 0.0 08/17/2023 04:55 AM    BASOSABS 0.0 08/17/2023 04:55 AM     BNP:   Lab Results   Component Value Date/Time     08/17/2023 04:55 AM    K 4.0 08/17/2023 04:55 AM

## 2023-08-17 NOTE — PLAN OF CARE
HEART FAILURE CARE PLAN:    Comorbidities Reviewed: Yes   Patient has a past medical history of Arthritis, Asthma, BiPAP (biphasic positive airway pressure) dependence, CHF (congestive heart failure) ([address]), Colon cancer ([address]), CPAP (continuous positive airway pressure) dependence, Depression, Diabetes mellitus ([address]), MRSA (methicillin resistant staph aureus) culture positive, PE (pulmonary embolism), Sleep apnea, and Vitamin D deficiency. ECHOCARDIOGRAM Reviewed: Yes   Patient's Ejection Fraction (EF) is greater than 40%    Weights Reviewed: Yes   Admission weight: (!) 460 lb (208.7 kg)   Wt Readings from Last 3 Encounters:   08/16/23 (!) 438 lb 1.6 oz (198.7 kg)   06/23/22 (!) 460 lb (208.7 kg)   06/23/21 (!) 460 lb 12.8 oz (209 kg)     Intake & Output Reviewed: Yes     Intake/Output Summary (Last 24 hours) at 8/16/2023 2120  Last data filed at 8/16/2023 1819  Gross per 24 hour   Intake 624 ml   Output 500 ml   Net 124 ml     Medications Reviewed: Yes   SCHEDULED HOSPITAL MEDICATIONS:   aspirin  81 mg Oral Daily    torsemide  20 mg Oral BID    apixaban  5 mg Oral BID    amiodarone  200 mg Oral BID    methylPREDNISolone  40 mg IntraVENous Daily    sodium chloride flush  5-40 mL IntraVENous 2 times per day    colchicine  0.6 mg Oral BID    atorvastatin  20 mg Oral Nightly    latanoprost  1 drop Both Eyes Nightly    metoprolol tartrate  50 mg Oral BID    timolol  1 drop Both Eyes BID    insulin lispro  0-16 Units SubCUTAneous TID WC    insulin lispro  0-4 Units SubCUTAneous Nightly     ACE/ARB/ARNI is REQUIRED for EF </= 13% SYSTOLIC FAILURE:   ACE[de-identified] None  ARB[de-identified] None  ARNI[de-identified] None    Evidenced-Based Beta Blocker is REQUIRED for EF </= 16% SYSTOLIC FAILURE:   [de-identified] Metoprolol SUCCinate- Toprol XL    Diuretics:  [de-identified] Furosemide and Torsemide    Diet Reviewed: Yes   ADULT DIET;  Regular    Goal of Care Reviewed: Yes   Patient and/or Family's stated Goal of Care this Admission: reduce shortness of breath, increase activity

## 2023-08-17 NOTE — PROGRESS NOTES
Physician Progress Note      PATIENT:               Yarelis Newton  CSN #:                  [id number]  :                       1950  ADMIT DATE:       2023 11:15 AM  DISCH DATE:  RESPONDING  PROVIDER #:        Gita Carlson MD          QUERY TEXT:    Pt admitted with rt ankle pain and has CHF documented. H/p notes chronic   diastolic CHF-well compensated. [id number]. Chest xray with no acute process. Patient noted to also have Acute on chronic distolic CHF on . If possible,   please document in progress notes and discharge summary further specificity   regarding CHF:    The medical record reflects the following:  Risk Factors: advanced age, aflutter, anemia, JAKE, DM2, obesity, cad/cabg  Clinical Indicators: per h/p: Chronic diastolic CHF -appears well compensated,   per PN on : acute on Chronic diastolic CHF  -appears well compensated today after diuresis, [id number], neg chest, per   cardiology PN on --NMSTYYZ diastolic HF  -LVEF 90-98% on echo; severe CLVH -does not appear to be significantly volume   overloaded on exam  Treatment: bb, low na diet, fluid restrictions, bnp, ekg, tele, cardiology   consult, I/O's, IV lasix    Thank you,  Judi Troy RN,BSN,CCDS,CRCR  Options provided:  -- Acute on Chronic Diastolic CHF/HFpEF as evidenced by, please include   clinical indicators. -- Chronic Diastolic CHF/HFpEF, acute ruled out  -- Other - I will add my own diagnosis  -- Disagree - Not applicable / Not valid  -- Disagree - Clinically unable to determine / Unknown  -- Refer to Clinical Documentation Reviewer    PROVIDER RESPONSE TEXT:    This patient is in acute on chronic diastolic CHF/HFpEF as evidenced by severe   fluid overload on exam    Query created by: Woodrow Myers on 2023 8:35 AM      Electronically signed by:   Gita Carlson MD 2023 1:16 PM

## 2023-08-18 ENCOUNTER — FOLLOWUP TELEPHONE ENCOUNTER (OUTPATIENT)
Dept: ADMINISTRATIVE | Age: 73
End: 2023-08-18

## 2023-08-18 NOTE — TELEPHONE ENCOUNTER
Heart Failure Follow-up Call:     1st Attempt; No Answer- Left HIPAA compliant voicemail with Non-Urgent Heart Failure Resource Line number for call back.     Electronically signed by JASON Marie, RN  on 8/18/2023 at 12:00 PM

## 2023-08-21 ENCOUNTER — FOLLOWUP TELEPHONE ENCOUNTER (OUTPATIENT)
Dept: ADMINISTRATIVE | Age: 73
End: 2023-08-21

## 2023-08-21 NOTE — TELEPHONE ENCOUNTER
Heart Failure Follow-up Call:     3rd Attempt yesterday on 8/20 at 1015 with No Answer- Left HIPAA compliant voicemail with Non-Urgent Heart Failure Resource Line number for call back.     Electronically signed by JASON Quintanilla, RN  on 8/21/2023 at 6:16 AM

## 2023-08-21 NOTE — TELEPHONE ENCOUNTER
Heart Failure Follow-up Call:     2nd Attempt; missed call on 8/18 at [phone] with voicemail from patients spouse. Attempted to call back with No Answer- Left HIPAA compliant voicemail with Non-Urgent Heart Failure Resource Line number for call back.     Electronically signed by JASON Lozano, RN  on 8/21/2023 at 6:15 AM

## 2023-09-01 NOTE — PROGRESS NOTES
accurately describes my personal service to the patient. Cost of prescription medications and patient compliance have been reviewed with patient. All questions answered. Thank you for allowing me to participate in the care of this individual.     Crystal Randolph.  Radu Arroyo M.D., [facility] [place]

## 2023-09-05 RX ORDER — AMIODARONE HYDROCHLORIDE 200 MG/1
TABLET ORAL
Qty: 60 TABLET | Refills: 0 | OUTPATIENT
Start: 2023-09-05

## 2023-09-11 ENCOUNTER — OFFICE VISIT (OUTPATIENT)
Dept: CARDIOLOGY CLINIC | Age: 73
End: 2023-09-11
Payer: MEDICARE

## 2023-09-11 VITALS
BODY MASS INDEX: 49.44 KG/M2 | DIASTOLIC BLOOD PRESSURE: 58 MMHG | SYSTOLIC BLOOD PRESSURE: 98 MMHG | WEIGHT: 315 LBS | HEART RATE: 58 BPM | HEIGHT: 67 IN | OXYGEN SATURATION: 93 %

## 2023-09-11 DIAGNOSIS — I48.3 TYPICAL ATRIAL FLUTTER (HCC): ICD-10-CM

## 2023-09-11 DIAGNOSIS — Z87.891 HISTORY OF TOBACCO ABUSE: ICD-10-CM

## 2023-09-11 DIAGNOSIS — G47.33 OSA (OBSTRUCTIVE SLEEP APNEA): ICD-10-CM

## 2023-09-11 DIAGNOSIS — I50.32 CHRONIC DIASTOLIC CHF (CONGESTIVE HEART FAILURE) (HCC): Primary | Chronic | ICD-10-CM

## 2023-09-11 DIAGNOSIS — R94.31 ABNORMAL EKG: ICD-10-CM

## 2023-09-11 DIAGNOSIS — I10 ESSENTIAL HYPERTENSION: ICD-10-CM

## 2023-09-11 DIAGNOSIS — I25.110 CORONARY ARTERY DISEASE INVOLVING NATIVE CORONARY ARTERY OF NATIVE HEART WITH UNSTABLE ANGINA PECTORIS (HCC): Chronic | ICD-10-CM

## 2023-09-11 DIAGNOSIS — E78.2 MIXED HYPERLIPIDEMIA: Chronic | ICD-10-CM

## 2023-09-11 DIAGNOSIS — R60.0 LOCALIZED EDEMA: ICD-10-CM

## 2023-09-11 DIAGNOSIS — R07.1 CHEST PAIN ON BREATHING: ICD-10-CM

## 2023-09-11 DIAGNOSIS — R06.02 SHORTNESS OF BREATH: ICD-10-CM

## 2023-09-11 PROCEDURE — 3017F COLORECTAL CA SCREEN DOC REV: CPT | Performed by: INTERNAL MEDICINE

## 2023-09-11 PROCEDURE — 1123F ACP DISCUSS/DSCN MKR DOCD: CPT | Performed by: INTERNAL MEDICINE

## 2023-09-11 PROCEDURE — G8427 DOCREV CUR MEDS BY ELIG CLIN: HCPCS | Performed by: INTERNAL MEDICINE

## 2023-09-11 PROCEDURE — 93000 ELECTROCARDIOGRAM COMPLETE: CPT | Performed by: INTERNAL MEDICINE

## 2023-09-11 PROCEDURE — 1036F TOBACCO NON-USER: CPT | Performed by: INTERNAL MEDICINE

## 2023-09-11 PROCEDURE — 99214 OFFICE O/P EST MOD 30 MIN: CPT | Performed by: INTERNAL MEDICINE

## 2023-09-11 PROCEDURE — 1111F DSCHRG MED/CURRENT MED MERGE: CPT | Performed by: INTERNAL MEDICINE

## 2023-09-11 PROCEDURE — 3078F DIAST BP <80 MM HG: CPT | Performed by: INTERNAL MEDICINE

## 2023-09-11 PROCEDURE — 3074F SYST BP LT 130 MM HG: CPT | Performed by: INTERNAL MEDICINE

## 2023-09-11 PROCEDURE — G8417 CALC BMI ABV UP PARAM F/U: HCPCS | Performed by: INTERNAL MEDICINE

## 2023-09-11 NOTE — PATIENT INSTRUCTIONS
Plan:  Current medications reviewed. Refills given as warranted. You are now on Eliquis since you went to the ED and they found that you have atrial flutter. This helps prevent you from developing a clot and future strokes. Continue taking Aspirin 81 mg. You now take a baby dose since you because you are also on eliquis. Stop taking amiodarone. I understand that you are not interested in having any more procedures done so we will manage your atrial flutter with medication  As long as you feel fine and are not fatigued, weak, lightheaded, passing out then I am not concerned about you having a lower bp and heart rate  If your chest pain gets more frequent or more severe call and let me know. It is ok to take an extra torsemide once in a while for 2-3 days to help with your swelling or increased shortness of breath. You do not need to call my office, you can do this on your own. Follow up with me in 6 months, call in December to make your next appointment.

## 2023-09-13 RX ORDER — TORSEMIDE 20 MG/1
20 TABLET ORAL 2 TIMES DAILY
Qty: 180 TABLET | Refills: 3 | Status: SHIPPED | OUTPATIENT
Start: 2023-09-13

## 2023-09-13 RX ORDER — APIXABAN 5 MG/1
5 TABLET, FILM COATED ORAL 2 TIMES DAILY
Qty: 60 TABLET | Refills: 0 | OUTPATIENT
Start: 2023-09-13

## 2023-09-13 NOTE — TELEPHONE ENCOUNTER
Medication Refill    Medication needing refilled:torsemide (DEMADEX) 20 MG tablet      Dosage of the medication:20MG    How are you taking this medication (QD, BID, TID, QID, PRN):  Sig: Take 1 tablet by mouth in the morning and at bedtime   Patient taking differently: Take 2 tablets by mouth daily             30 or 90 day supply called in:30    When will you run out of your medication:3 days     Which Pharmacy are we sending the medication to?:    [facility] [id number] - [facility] Dave Javier [phone]    [facility], [address] [place] [address]  [address] [facility]   Phone:  [phone]  Fax:  [phone]

## 2023-09-27 ENCOUNTER — HOSPITAL ENCOUNTER (EMERGENCY)
Age: 73
Discharge: HOME | End: 2023-09-27
Attending: EMERGENCY MEDICINE
Payer: MEDICARE

## 2023-09-27 ENCOUNTER — APPOINTMENT (OUTPATIENT)
Dept: GENERAL RADIOLOGY | Age: 73
End: 2023-09-27
Payer: MEDICARE

## 2023-09-27 VITALS
DIASTOLIC BLOOD PRESSURE: 97 MMHG | WEIGHT: 315 LBS | HEIGHT: 67 IN | RESPIRATION RATE: 13 BRPM | SYSTOLIC BLOOD PRESSURE: 116 MMHG | OXYGEN SATURATION: 98 % | TEMPERATURE: 100.2 F | HEART RATE: 60 BPM | BODY MASS INDEX: 49.44 KG/M2

## 2023-09-27 DIAGNOSIS — M10.9 ACUTE GOUTY ARTHRITIS: ICD-10-CM

## 2023-09-27 DIAGNOSIS — R07.9 CHEST PAIN, UNSPECIFIED TYPE: Primary | ICD-10-CM

## 2023-09-27 LAB
ALBUMIN SERPL-MCNC: 3.8 G/DL (ref 3.4–5)
ALBUMIN/GLOB SERPL: 1.5 {RATIO} (ref 1.1–2.2)
ALP SERPL-CCNC: 79 U/L (ref 40–129)
ALT SERPL-CCNC: 18 U/L (ref 10–40)
ANION GAP SERPL CALCULATED.3IONS-SCNC: 13 MMOL/L (ref 3–16)
AST SERPL-CCNC: 14 U/L (ref 15–37)
BASOPHILS # BLD: 0.1 K/UL (ref 0–0.2)
BASOPHILS NFR BLD: 1.1 %
BILIRUB SERPL-MCNC: 1 MG/DL (ref 0–1)
BUN SERPL-MCNC: 18 MG/DL (ref 7–20)
CALCIUM SERPL-MCNC: 9 MG/DL (ref 8.3–10.6)
CHLORIDE SERPL-SCNC: 102 MMOL/L (ref 99–110)
CO2 SERPL-SCNC: 26 MMOL/L (ref 21–32)
CREAT SERPL-MCNC: 0.9 MG/DL (ref 0.8–1.3)
DEPRECATED RDW RBC AUTO: 17 % (ref 12.4–15.4)
EKG ATRIAL RATE: 238 BPM
EKG DIAGNOSIS: NORMAL
EKG P AXIS: 253 DEGREES
EKG Q-T INTERVAL: 466 MS
EKG QRS DURATION: 144 MS
EKG QTC CALCULATION (BAZETT): 466 MS
EKG R AXIS: -60 DEGREES
EKG T AXIS: 126 DEGREES
EKG VENTRICULAR RATE: 60 BPM
EOSINOPHIL # BLD: 0.3 K/UL (ref 0–0.6)
EOSINOPHIL NFR BLD: 3.4 %
GFR SERPLBLD CREATININE-BSD FMLA CKD-EPI: >60 ML/MIN/{1.73_M2}
GLUCOSE SERPL-MCNC: 102 MG/DL (ref 70–99)
HCT VFR BLD AUTO: 36.7 % (ref 40.5–52.5)
HGB BLD-MCNC: 11.9 G/DL (ref 13.5–17.5)
LIPASE SERPL-CCNC: 30 U/L (ref 13–60)
LYMPHOCYTES # BLD: 2 K/UL (ref 1–5.1)
LYMPHOCYTES NFR BLD: 22.4 %
MCH RBC QN AUTO: 27.8 PG (ref 26–34)
MCHC RBC AUTO-ENTMCNC: 32.3 G/DL (ref 31–36)
MCV RBC AUTO: 85.9 FL (ref 80–100)
MONOCYTES # BLD: 1 K/UL (ref 0–1.3)
MONOCYTES NFR BLD: 11.1 %
NEUTROPHILS # BLD: 5.6 K/UL (ref 1.7–7.7)
NEUTROPHILS NFR BLD: 62 %
PLATELET # BLD AUTO: 195 K/UL (ref 135–450)
PMV BLD AUTO: 7.3 FL (ref 5–10.5)
POTASSIUM SERPL-SCNC: 4.4 MMOL/L (ref 3.5–5.1)
PROT SERPL-MCNC: 6.4 G/DL (ref 6.4–8.2)
RBC # BLD AUTO: 4.27 M/UL (ref 4.2–5.9)
SODIUM SERPL-SCNC: 141 MMOL/L (ref 136–145)
TROPONIN, HIGH SENSITIVITY: 27 NG/L (ref 0–22)
TROPONIN, HIGH SENSITIVITY: 28 NG/L (ref 0–22)
WBC # BLD AUTO: 9 K/UL (ref 4–11)

## 2023-09-27 PROCEDURE — 36415 COLL VENOUS BLD VENIPUNCTURE: CPT

## 2023-09-27 PROCEDURE — 93010 ELECTROCARDIOGRAM REPORT: CPT | Performed by: INTERNAL MEDICINE

## 2023-09-27 PROCEDURE — 6360000002 HC RX W HCPCS: Performed by: EMERGENCY MEDICINE

## 2023-09-27 PROCEDURE — 80053 COMPREHEN METABOLIC PANEL: CPT

## 2023-09-27 PROCEDURE — 96374 THER/PROPH/DIAG INJ IV PUSH: CPT

## 2023-09-27 PROCEDURE — 99285 EMERGENCY DEPT VISIT HI MDM: CPT

## 2023-09-27 PROCEDURE — 85025 COMPLETE CBC W/AUTO DIFF WBC: CPT

## 2023-09-27 PROCEDURE — 84484 ASSAY OF TROPONIN QUANT: CPT

## 2023-09-27 PROCEDURE — 71045 X-RAY EXAM CHEST 1 VIEW: CPT

## 2023-09-27 PROCEDURE — 96375 TX/PRO/DX INJ NEW DRUG ADDON: CPT

## 2023-09-27 PROCEDURE — 6370000000 HC RX 637 (ALT 250 FOR IP): Performed by: EMERGENCY MEDICINE

## 2023-09-27 PROCEDURE — 83690 ASSAY OF LIPASE: CPT

## 2023-09-27 PROCEDURE — 93005 ELECTROCARDIOGRAM TRACING: CPT | Performed by: EMERGENCY MEDICINE

## 2023-09-27 RX ORDER — PREDNISONE 10 MG/1
TABLET ORAL
Qty: 30 TABLET | Refills: 0 | Status: SHIPPED | OUTPATIENT
Start: 2023-09-27 | End: 2023-10-09

## 2023-09-27 RX ORDER — OMEPRAZOLE 20 MG/1
40 TABLET, DELAYED RELEASE ORAL DAILY
Qty: 30 TABLET | Refills: 3 | Status: SHIPPED | OUTPATIENT
Start: 2023-09-27

## 2023-09-27 RX ORDER — HYDROMORPHONE HYDROCHLORIDE 2 MG/1
2 TABLET ORAL EVERY 6 HOURS PRN
Qty: 12 TABLET | Refills: 0 | Status: SHIPPED | OUTPATIENT
Start: 2023-09-27 | End: 2023-09-30

## 2023-09-27 RX ORDER — ONDANSETRON HYDROCHLORIDE 8 MG/1
8 TABLET, FILM COATED ORAL EVERY 8 HOURS PRN
Qty: 10 TABLET | Refills: 0 | Status: SHIPPED | OUTPATIENT
Start: 2023-09-27

## 2023-09-27 RX ORDER — KETOROLAC TROMETHAMINE 30 MG/ML
30 INJECTION, SOLUTION INTRAMUSCULAR; INTRAVENOUS ONCE
Status: COMPLETED | OUTPATIENT
Start: 2023-09-27 | End: 2023-09-27

## 2023-09-27 RX ORDER — FAMOTIDINE 20 MG/1
20 TABLET, FILM COATED ORAL ONCE
Status: COMPLETED | OUTPATIENT
Start: 2023-09-27 | End: 2023-09-27

## 2023-09-27 RX ORDER — DEXAMETHASONE SODIUM PHOSPHATE 10 MG/ML
10 INJECTION, SOLUTION INTRAMUSCULAR; INTRAVENOUS ONCE
Status: COMPLETED | OUTPATIENT
Start: 2023-09-27 | End: 2023-09-27

## 2023-09-27 RX ORDER — OMEPRAZOLE 20 MG/1
40 TABLET, DELAYED RELEASE ORAL DAILY
Qty: 30 TABLET | Refills: 3 | Status: SHIPPED | OUTPATIENT
Start: 2023-09-27 | End: 2023-09-27 | Stop reason: SDUPTHER

## 2023-09-27 RX ADMIN — HYDROMORPHONE HYDROCHLORIDE 1 MG: 1 INJECTION, SOLUTION INTRAMUSCULAR; INTRAVENOUS; SUBCUTANEOUS at 16:23

## 2023-09-27 RX ADMIN — Medication: at 16:23

## 2023-09-27 RX ADMIN — KETOROLAC TROMETHAMINE 30 MG: 30 INJECTION, SOLUTION INTRAMUSCULAR at 16:24

## 2023-09-27 RX ADMIN — DEXAMETHASONE SODIUM PHOSPHATE 10 MG: 10 INJECTION, SOLUTION INTRAMUSCULAR; INTRAVENOUS at 16:24

## 2023-09-27 RX ADMIN — FAMOTIDINE 20 MG: 20 TABLET ORAL at 16:24

## 2023-09-27 ASSESSMENT — PAIN SCALES - GENERAL
PAINLEVEL_OUTOF10: 7
PAINLEVEL_OUTOF10: 5

## 2023-09-27 ASSESSMENT — ENCOUNTER SYMPTOMS
CHEST TIGHTNESS: 1
ABDOMINAL PAIN: 0
BACK PAIN: 0
SHORTNESS OF BREATH: 0
COUGH: 0

## 2023-09-27 NOTE — DISCHARGE INSTRUCTIONS
Take Prilosec daily  Take prednisone tapering dose  Take Dilaudid for severe pain  Try to stay off of your right ankle is much as you possibly can to rest it which is a major part of gout treatment  Follow-up with Dr. Corrine Saleh

## 2023-09-27 NOTE — ED PROVIDER NOTES
[address] [facility]      Pt Name: Brenda Rendon  MRN: [id number]  [address] [facility] 1950  Date of evaluation: 9/27/2023  Provider: Lorena Stout MD    CHIEF COMPLAINT       Chief Complaint   Patient presents with    Chest Pain     Pt brought in by EMS with complaints of chest pain that began today. Pt states he has been experiencing gout flare up and having right foot pain. He has been immobile at home for the past few days because of the pain. He developed severe chest pain this afternoon. Per EMS pt was bradycardic and hypotensive. HISTORY OF PRESENT ILLNESS   (Location/Symptom, Timing/Onset, Context/Setting, Quality, Duration, Modifying Factors, Severity)  Note limiting factors. Brenda Rendon is a 68 y.o. male who presents to the emergency department     Patient apparently was laying flat he had eaten a egg hot omelette was onions on it he said that he got some heaviness in his mid chest area there was no radiation he said he sat up and he did feel better. He was still having pain about 8/10  His history is complex and the fact that he does have a history of atrial fibs flutter he is anticoagulated on Eliquis and did take amiodarone he had no nausea or vomiting he really was not significantly short of breath he does have a history of longstanding obstructive sleep apnea and does wear his CPAP mask  He does have a history of gout with recent admission for an acute gouty arthritis attack from August 14 August 15    The history is provided by the patient and the spouse. Nursing Notes were reviewed. REVIEW OF SYSTEMS    (2-9 systems for level 4, 10 or more for level 5)     Review of Systems   Constitutional:  Positive for activity change. Negative for chills, fatigue and fever. Eyes:  Negative for visual disturbance. Respiratory:  Positive for chest tightness. Negative for cough and shortness of breath. Cardiovascular:  Positive for chest pain.

## 2023-12-11 RX ORDER — METOPROLOL SUCCINATE 50 MG/1
50 TABLET, EXTENDED RELEASE ORAL DAILY
Qty: 30 TABLET | Refills: 3 | OUTPATIENT
Start: 2023-12-11

## 2023-12-18 RX ORDER — METOPROLOL SUCCINATE 50 MG/1
50 TABLET, EXTENDED RELEASE ORAL DAILY
Qty: 30 TABLET | Refills: 3 | OUTPATIENT
Start: 2023-12-18

## 2023-12-20 RX ORDER — METOPROLOL SUCCINATE 50 MG/1
50 TABLET, EXTENDED RELEASE ORAL DAILY
Qty: 30 TABLET | Refills: 3 | OUTPATIENT
Start: 2023-12-20

## 2024-02-08 ENCOUNTER — APPOINTMENT (OUTPATIENT)
Dept: GENERAL RADIOLOGY | Age: 74
End: 2024-02-08
Payer: MEDICARE

## 2024-02-08 ENCOUNTER — APPOINTMENT (OUTPATIENT)
Dept: CT IMAGING | Age: 74
End: 2024-02-08
Payer: MEDICARE

## 2024-02-08 ENCOUNTER — HOSPITAL ENCOUNTER (INPATIENT)
Age: 74
LOS: 2 days | Discharge: ANOTHER HOSPITAL | End: 2024-02-10
Attending: EMERGENCY MEDICINE | Admitting: INTERNAL MEDICINE
Payer: MEDICARE

## 2024-02-08 DIAGNOSIS — R06.00 DYSPNEA, UNSPECIFIED TYPE: ICD-10-CM

## 2024-02-08 DIAGNOSIS — R07.9 CHEST PAIN, UNSPECIFIED TYPE: Primary | ICD-10-CM

## 2024-02-08 PROBLEM — I50.33 ACUTE ON CHRONIC HEART FAILURE WITH NORMAL EJECTION FRACTION (HCC): Status: ACTIVE | Noted: 2024-02-08

## 2024-02-08 LAB
ALBUMIN SERPL-MCNC: 3.6 G/DL (ref 3.4–5)
ALBUMIN/GLOB SERPL: 1.5 {RATIO} (ref 1.1–2.2)
ALP SERPL-CCNC: 76 U/L (ref 40–129)
ALT SERPL-CCNC: 11 U/L (ref 10–40)
ANION GAP SERPL CALCULATED.3IONS-SCNC: 8 MMOL/L (ref 3–16)
AST SERPL-CCNC: 13 U/L (ref 15–37)
BASOPHILS # BLD: 0 K/UL (ref 0–0.2)
BASOPHILS NFR BLD: 0 %
BILIRUB SERPL-MCNC: 1 MG/DL (ref 0–1)
BUN SERPL-MCNC: 11 MG/DL (ref 7–20)
CALCIUM SERPL-MCNC: 8.9 MG/DL (ref 8.3–10.6)
CHLORIDE SERPL-SCNC: 100 MMOL/L (ref 99–110)
CO2 SERPL-SCNC: 30 MMOL/L (ref 21–32)
CREAT SERPL-MCNC: 0.9 MG/DL (ref 0.8–1.3)
DEPRECATED RDW RBC AUTO: 16.6 % (ref 12.4–15.4)
EKG ATRIAL RATE: 232 BPM
EKG DIAGNOSIS: NORMAL
EKG Q-T INTERVAL: 474 MS
EKG QRS DURATION: 138 MS
EKG QTC CALCULATION (BAZETT): 465 MS
EKG R AXIS: -45 DEGREES
EKG T AXIS: 127 DEGREES
EKG VENTRICULAR RATE: 58 BPM
EOSINOPHIL # BLD: 1 K/UL (ref 0–0.6)
EOSINOPHIL NFR BLD: 12 %
FLUAV RNA RESP QL NAA+PROBE: NOT DETECTED
FLUBV RNA RESP QL NAA+PROBE: NOT DETECTED
GFR SERPLBLD CREATININE-BSD FMLA CKD-EPI: >60 ML/MIN/{1.73_M2}
GLUCOSE BLD-MCNC: 278 MG/DL (ref 70–99)
GLUCOSE SERPL-MCNC: 146 MG/DL (ref 70–99)
HCT VFR BLD AUTO: 31.6 % (ref 40.5–52.5)
HGB BLD-MCNC: 10.3 G/DL (ref 13.5–17.5)
LYMPHOCYTES # BLD: 2.4 K/UL (ref 1–5.1)
LYMPHOCYTES NFR BLD: 29 %
MCH RBC QN AUTO: 29.6 PG (ref 26–34)
MCHC RBC AUTO-ENTMCNC: 32.5 G/DL (ref 31–36)
MCV RBC AUTO: 91 FL (ref 80–100)
MONOCYTES # BLD: 0.2 K/UL (ref 0–1.3)
MONOCYTES NFR BLD: 2 %
NEUTROPHILS # BLD: 4.8 K/UL (ref 1.7–7.7)
NEUTROPHILS NFR BLD: 57 %
NT-PROBNP SERPL-MCNC: 1522 PG/ML (ref 0–124)
PERFORMED ON: ABNORMAL
PLATELET # BLD AUTO: 188 K/UL (ref 135–450)
PMV BLD AUTO: 8 FL (ref 5–10.5)
POTASSIUM SERPL-SCNC: 3.9 MMOL/L (ref 3.5–5.1)
PROT SERPL-MCNC: 6 G/DL (ref 6.4–8.2)
RBC # BLD AUTO: 3.48 M/UL (ref 4.2–5.9)
SARS-COV-2 RNA RESP QL NAA+PROBE: NOT DETECTED
SLIDE REVIEW: ABNORMAL
SODIUM SERPL-SCNC: 138 MMOL/L (ref 136–145)
TROPONIN, HIGH SENSITIVITY: 33 NG/L (ref 0–22)
TROPONIN, HIGH SENSITIVITY: 34 NG/L (ref 0–22)
WBC # BLD AUTO: 8.4 K/UL (ref 4–11)

## 2024-02-08 PROCEDURE — 93010 ELECTROCARDIOGRAM REPORT: CPT | Performed by: INTERNAL MEDICINE

## 2024-02-08 PROCEDURE — 87636 SARSCOV2 & INF A&B AMP PRB: CPT

## 2024-02-08 PROCEDURE — 71260 CT THORAX DX C+: CPT

## 2024-02-08 PROCEDURE — 85025 COMPLETE CBC W/AUTO DIFF WBC: CPT

## 2024-02-08 PROCEDURE — 93005 ELECTROCARDIOGRAM TRACING: CPT | Performed by: EMERGENCY MEDICINE

## 2024-02-08 PROCEDURE — 6360000002 HC RX W HCPCS: Performed by: EMERGENCY MEDICINE

## 2024-02-08 PROCEDURE — 6360000004 HC RX CONTRAST MEDICATION: Performed by: EMERGENCY MEDICINE

## 2024-02-08 PROCEDURE — 84484 ASSAY OF TROPONIN QUANT: CPT

## 2024-02-08 PROCEDURE — 1200000000 HC SEMI PRIVATE

## 2024-02-08 PROCEDURE — 71045 X-RAY EXAM CHEST 1 VIEW: CPT

## 2024-02-08 PROCEDURE — 99285 EMERGENCY DEPT VISIT HI MDM: CPT

## 2024-02-08 PROCEDURE — 94761 N-INVAS EAR/PLS OXIMETRY MLT: CPT

## 2024-02-08 PROCEDURE — 36415 COLL VENOUS BLD VENIPUNCTURE: CPT

## 2024-02-08 PROCEDURE — 80053 COMPREHEN METABOLIC PANEL: CPT

## 2024-02-08 PROCEDURE — 96374 THER/PROPH/DIAG INJ IV PUSH: CPT

## 2024-02-08 PROCEDURE — 2700000000 HC OXYGEN THERAPY PER DAY

## 2024-02-08 PROCEDURE — 83880 ASSAY OF NATRIURETIC PEPTIDE: CPT

## 2024-02-08 RX ORDER — ASPIRIN 81 MG/1
324 TABLET, CHEWABLE ORAL ONCE
Status: DISCONTINUED | OUTPATIENT
Start: 2024-02-08 | End: 2024-02-08

## 2024-02-08 RX ORDER — FUROSEMIDE 10 MG/ML
20 INJECTION INTRAMUSCULAR; INTRAVENOUS ONCE
Status: COMPLETED | OUTPATIENT
Start: 2024-02-08 | End: 2024-02-08

## 2024-02-08 RX ADMIN — FUROSEMIDE 20 MG: 10 INJECTION, SOLUTION INTRAMUSCULAR; INTRAVENOUS at 18:43

## 2024-02-08 RX ADMIN — IOPAMIDOL 85 ML: 755 INJECTION, SOLUTION INTRAVENOUS at 19:21

## 2024-02-08 ASSESSMENT — PAIN DESCRIPTION - PAIN TYPE: TYPE: ACUTE PAIN

## 2024-02-08 ASSESSMENT — PAIN DESCRIPTION - DESCRIPTORS
DESCRIPTORS: PRESSURE
DESCRIPTORS: HEAVINESS

## 2024-02-08 ASSESSMENT — PAIN - FUNCTIONAL ASSESSMENT
PAIN_FUNCTIONAL_ASSESSMENT: 0-10
PAIN_FUNCTIONAL_ASSESSMENT: ACTIVITIES ARE NOT PREVENTED

## 2024-02-08 ASSESSMENT — PAIN DESCRIPTION - ORIENTATION: ORIENTATION: MID

## 2024-02-08 ASSESSMENT — PAIN DESCRIPTION - LOCATION
LOCATION: CHEST
LOCATION: CHEST

## 2024-02-08 ASSESSMENT — PAIN DESCRIPTION - FREQUENCY: FREQUENCY: CONTINUOUS

## 2024-02-08 ASSESSMENT — LIFESTYLE VARIABLES
HOW OFTEN DO YOU HAVE A DRINK CONTAINING ALCOHOL: MONTHLY OR LESS
HOW MANY STANDARD DRINKS CONTAINING ALCOHOL DO YOU HAVE ON A TYPICAL DAY: 1 OR 2

## 2024-02-08 ASSESSMENT — PAIN SCALES - GENERAL
PAINLEVEL_OUTOF10: 8
PAINLEVEL_OUTOF10: 8

## 2024-02-08 NOTE — ED NOTES
Patient placed back on o2 after chest x ray, patient complaining of shortness of breath and had work of breathing

## 2024-02-08 NOTE — ED TRIAGE NOTES
Chest pain that is worse with deep breathing. Shortness of breath that started last night. Increase swelling in legs. Patient states he has been taking his medication. Ems gave patient 324 baby asa, 125 solumedrol and a duoneb. 18 g left a/c patient refused nitro

## 2024-02-09 PROBLEM — R00.1 BRADYCARDIA: Status: ACTIVE | Noted: 2024-02-09

## 2024-02-09 PROBLEM — I48.92 ATRIAL FLUTTER (HCC): Status: ACTIVE | Noted: 2023-08-15

## 2024-02-09 PROBLEM — R06.00 DYSPNEA: Status: ACTIVE | Noted: 2017-04-25

## 2024-02-09 PROBLEM — I50.33 ACUTE ON CHRONIC DIASTOLIC CHF (CONGESTIVE HEART FAILURE) (HCC): Status: ACTIVE | Noted: 2023-08-16

## 2024-02-09 LAB
ANION GAP SERPL CALCULATED.3IONS-SCNC: 13 MMOL/L (ref 3–16)
BUN SERPL-MCNC: 14 MG/DL (ref 7–20)
CALCIUM SERPL-MCNC: 8.4 MG/DL (ref 8.3–10.6)
CHLORIDE SERPL-SCNC: 100 MMOL/L (ref 99–110)
CO2 SERPL-SCNC: 23 MMOL/L (ref 21–32)
CREAT SERPL-MCNC: 0.7 MG/DL (ref 0.8–1.3)
EKG ATRIAL RATE: 39 BPM
EKG DIAGNOSIS: NORMAL
EKG Q-T INTERVAL: 342 MS
EKG QRS DURATION: 136 MS
EKG QTC CALCULATION (BAZETT): 278 MS
EKG R AXIS: -56 DEGREES
EKG T AXIS: 163 DEGREES
EKG VENTRICULAR RATE: 40 BPM
GFR SERPLBLD CREATININE-BSD FMLA CKD-EPI: >60 ML/MIN/{1.73_M2}
GLUCOSE BLD-MCNC: 253 MG/DL (ref 70–99)
GLUCOSE BLD-MCNC: 276 MG/DL (ref 70–99)
GLUCOSE BLD-MCNC: 282 MG/DL (ref 70–99)
GLUCOSE BLD-MCNC: 302 MG/DL (ref 70–99)
GLUCOSE SERPL-MCNC: 264 MG/DL (ref 70–99)
MAGNESIUM SERPL-MCNC: 2 MG/DL (ref 1.8–2.4)
PERFORMED ON: ABNORMAL
POTASSIUM SERPL-SCNC: 4.4 MMOL/L (ref 3.5–5.1)
SODIUM SERPL-SCNC: 136 MMOL/L (ref 136–145)
TROPONIN, HIGH SENSITIVITY: 21 NG/L (ref 0–22)

## 2024-02-09 PROCEDURE — 99223 1ST HOSP IP/OBS HIGH 75: CPT | Performed by: INTERNAL MEDICINE

## 2024-02-09 PROCEDURE — 5A09357 ASSISTANCE WITH RESPIRATORY VENTILATION, LESS THAN 24 CONSECUTIVE HOURS, CONTINUOUS POSITIVE AIRWAY PRESSURE: ICD-10-PCS | Performed by: INTERNAL MEDICINE

## 2024-02-09 PROCEDURE — 93308 TTE F-UP OR LMTD: CPT

## 2024-02-09 PROCEDURE — 93010 ELECTROCARDIOGRAM REPORT: CPT | Performed by: INTERNAL MEDICINE

## 2024-02-09 PROCEDURE — 84484 ASSAY OF TROPONIN QUANT: CPT

## 2024-02-09 PROCEDURE — 6360000004 HC RX CONTRAST MEDICATION: Performed by: INTERNAL MEDICINE

## 2024-02-09 PROCEDURE — 6370000000 HC RX 637 (ALT 250 FOR IP): Performed by: INTERNAL MEDICINE

## 2024-02-09 PROCEDURE — 93325 DOPPLER ECHO COLOR FLOW MAPG: CPT

## 2024-02-09 PROCEDURE — 80048 BASIC METABOLIC PNL TOTAL CA: CPT

## 2024-02-09 PROCEDURE — 6360000002 HC RX W HCPCS: Performed by: INTERNAL MEDICINE

## 2024-02-09 PROCEDURE — 83036 HEMOGLOBIN GLYCOSYLATED A1C: CPT

## 2024-02-09 PROCEDURE — 1200000000 HC SEMI PRIVATE

## 2024-02-09 PROCEDURE — 36415 COLL VENOUS BLD VENIPUNCTURE: CPT

## 2024-02-09 PROCEDURE — 83735 ASSAY OF MAGNESIUM: CPT

## 2024-02-09 PROCEDURE — 99232 SBSQ HOSP IP/OBS MODERATE 35: CPT | Performed by: INTERNAL MEDICINE

## 2024-02-09 PROCEDURE — 2700000000 HC OXYGEN THERAPY PER DAY

## 2024-02-09 PROCEDURE — 2580000003 HC RX 258: Performed by: INTERNAL MEDICINE

## 2024-02-09 PROCEDURE — 94761 N-INVAS EAR/PLS OXIMETRY MLT: CPT

## 2024-02-09 PROCEDURE — 93321 DOPPLER ECHO F-UP/LMTD STD: CPT

## 2024-02-09 RX ORDER — FERROUS SULFATE 325(65) MG
325 TABLET ORAL
COMMUNITY

## 2024-02-09 RX ORDER — SODIUM CHLORIDE 9 MG/ML
INJECTION, SOLUTION INTRAVENOUS PRN
Status: DISCONTINUED | OUTPATIENT
Start: 2024-02-09 | End: 2024-02-10 | Stop reason: HOSPADM

## 2024-02-09 RX ORDER — ONDANSETRON 4 MG/1
4 TABLET, ORALLY DISINTEGRATING ORAL EVERY 8 HOURS PRN
Status: DISCONTINUED | OUTPATIENT
Start: 2024-02-09 | End: 2024-02-10 | Stop reason: HOSPADM

## 2024-02-09 RX ORDER — FUROSEMIDE 10 MG/ML
40 INJECTION INTRAMUSCULAR; INTRAVENOUS 2 TIMES DAILY
Status: DISCONTINUED | OUTPATIENT
Start: 2024-02-09 | End: 2024-02-10 | Stop reason: HOSPADM

## 2024-02-09 RX ORDER — ATORVASTATIN CALCIUM 40 MG/1
40 TABLET, FILM COATED ORAL NIGHTLY
Status: DISCONTINUED | OUTPATIENT
Start: 2024-02-09 | End: 2024-02-10 | Stop reason: HOSPADM

## 2024-02-09 RX ORDER — SODIUM CHLORIDE 0.9 % (FLUSH) 0.9 %
5-40 SYRINGE (ML) INJECTION EVERY 12 HOURS SCHEDULED
Status: DISCONTINUED | OUTPATIENT
Start: 2024-02-09 | End: 2024-02-10 | Stop reason: HOSPADM

## 2024-02-09 RX ORDER — SODIUM CHLORIDE 0.9 % (FLUSH) 0.9 %
5-40 SYRINGE (ML) INJECTION PRN
Status: DISCONTINUED | OUTPATIENT
Start: 2024-02-09 | End: 2024-02-10 | Stop reason: HOSPADM

## 2024-02-09 RX ORDER — POTASSIUM CHLORIDE 20 MEQ/1
40 TABLET, EXTENDED RELEASE ORAL PRN
Status: DISCONTINUED | OUTPATIENT
Start: 2024-02-09 | End: 2024-02-10 | Stop reason: HOSPADM

## 2024-02-09 RX ORDER — MAGNESIUM SULFATE IN WATER 40 MG/ML
2000 INJECTION, SOLUTION INTRAVENOUS PRN
Status: DISCONTINUED | OUTPATIENT
Start: 2024-02-09 | End: 2024-02-10 | Stop reason: HOSPADM

## 2024-02-09 RX ORDER — ALLOPURINOL 300 MG/1
300 TABLET ORAL DAILY
Status: DISCONTINUED | OUTPATIENT
Start: 2024-02-09 | End: 2024-02-10 | Stop reason: HOSPADM

## 2024-02-09 RX ORDER — DORZOLAMIDE HYDROCHLORIDE AND TIMOLOL MALEATE 20; 5 MG/ML; MG/ML
1 SOLUTION/ DROPS OPHTHALMIC 2 TIMES DAILY
COMMUNITY

## 2024-02-09 RX ORDER — INSULIN LISPRO 100 [IU]/ML
0-4 INJECTION, SOLUTION INTRAVENOUS; SUBCUTANEOUS NIGHTLY
Status: DISCONTINUED | OUTPATIENT
Start: 2024-02-09 | End: 2024-02-10 | Stop reason: HOSPADM

## 2024-02-09 RX ORDER — ATORVASTATIN CALCIUM 10 MG/1
20 TABLET, FILM COATED ORAL NIGHTLY
Status: DISCONTINUED | OUTPATIENT
Start: 2024-02-09 | End: 2024-02-09

## 2024-02-09 RX ORDER — INSULIN LISPRO 100 [IU]/ML
50 INJECTION, SOLUTION INTRAVENOUS; SUBCUTANEOUS 2 TIMES DAILY
Status: DISCONTINUED | OUTPATIENT
Start: 2024-02-09 | End: 2024-02-09

## 2024-02-09 RX ORDER — TIMOLOL MALEATE 5 MG/ML
1 SOLUTION/ DROPS OPHTHALMIC 2 TIMES DAILY
Status: DISCONTINUED | OUTPATIENT
Start: 2024-02-09 | End: 2024-02-10 | Stop reason: HOSPADM

## 2024-02-09 RX ORDER — METOPROLOL SUCCINATE 50 MG/1
50 TABLET, EXTENDED RELEASE ORAL DAILY
Status: DISCONTINUED | OUTPATIENT
Start: 2024-02-09 | End: 2024-02-09

## 2024-02-09 RX ORDER — POTASSIUM CHLORIDE 7.45 MG/ML
10 INJECTION INTRAVENOUS PRN
Status: DISCONTINUED | OUTPATIENT
Start: 2024-02-09 | End: 2024-02-10 | Stop reason: HOSPADM

## 2024-02-09 RX ORDER — GLUCAGON 1 MG/ML
1 KIT INJECTION PRN
Status: DISCONTINUED | OUTPATIENT
Start: 2024-02-09 | End: 2024-02-10 | Stop reason: HOSPADM

## 2024-02-09 RX ORDER — POTASSIUM CHLORIDE 750 MG/1
20 CAPSULE, EXTENDED RELEASE ORAL DAILY
Status: ON HOLD | COMMUNITY
End: 2024-02-13 | Stop reason: HOSPADM

## 2024-02-09 RX ORDER — ACETAMINOPHEN 650 MG/1
650 SUPPOSITORY RECTAL EVERY 6 HOURS PRN
Status: DISCONTINUED | OUTPATIENT
Start: 2024-02-09 | End: 2024-02-10 | Stop reason: HOSPADM

## 2024-02-09 RX ORDER — DORZOLAMIDE HYDROCHLORIDE AND TIMOLOL MALEATE 20; 5 MG/ML; MG/ML
1 SOLUTION/ DROPS OPHTHALMIC 2 TIMES DAILY
Status: DISCONTINUED | OUTPATIENT
Start: 2024-02-09 | End: 2024-02-09

## 2024-02-09 RX ORDER — POLYETHYLENE GLYCOL 3350 17 G/17G
17 POWDER, FOR SOLUTION ORAL DAILY PRN
Status: DISCONTINUED | OUTPATIENT
Start: 2024-02-09 | End: 2024-02-10 | Stop reason: HOSPADM

## 2024-02-09 RX ORDER — METOPROLOL SUCCINATE 25 MG/1
25 TABLET, EXTENDED RELEASE ORAL DAILY
Status: DISCONTINUED | OUTPATIENT
Start: 2024-02-10 | End: 2024-02-10

## 2024-02-09 RX ORDER — ASPIRIN 81 MG/1
81 TABLET ORAL DAILY
Status: DISCONTINUED | OUTPATIENT
Start: 2024-02-09 | End: 2024-02-10 | Stop reason: HOSPADM

## 2024-02-09 RX ORDER — DORZOLAMIDE HCL 20 MG/ML
1 SOLUTION/ DROPS OPHTHALMIC 2 TIMES DAILY
Status: DISCONTINUED | OUTPATIENT
Start: 2024-02-09 | End: 2024-02-10 | Stop reason: HOSPADM

## 2024-02-09 RX ORDER — POTASSIUM CHLORIDE 20 MEQ/1
20 TABLET, EXTENDED RELEASE ORAL DAILY
Status: DISCONTINUED | OUTPATIENT
Start: 2024-02-09 | End: 2024-02-10 | Stop reason: HOSPADM

## 2024-02-09 RX ORDER — INSULIN LISPRO 100 [IU]/ML
0-8 INJECTION, SOLUTION INTRAVENOUS; SUBCUTANEOUS
Status: DISCONTINUED | OUTPATIENT
Start: 2024-02-09 | End: 2024-02-10 | Stop reason: HOSPADM

## 2024-02-09 RX ORDER — DEXTROSE MONOHYDRATE 100 MG/ML
INJECTION, SOLUTION INTRAVENOUS CONTINUOUS PRN
Status: DISCONTINUED | OUTPATIENT
Start: 2024-02-09 | End: 2024-02-10 | Stop reason: HOSPADM

## 2024-02-09 RX ORDER — PANTOPRAZOLE SODIUM 40 MG/1
40 TABLET, DELAYED RELEASE ORAL
Status: DISCONTINUED | OUTPATIENT
Start: 2024-02-09 | End: 2024-02-10 | Stop reason: HOSPADM

## 2024-02-09 RX ORDER — ONDANSETRON 2 MG/ML
4 INJECTION INTRAMUSCULAR; INTRAVENOUS EVERY 6 HOURS PRN
Status: DISCONTINUED | OUTPATIENT
Start: 2024-02-09 | End: 2024-02-10 | Stop reason: HOSPADM

## 2024-02-09 RX ORDER — ALLOPURINOL 300 MG/1
300 TABLET ORAL DAILY
COMMUNITY

## 2024-02-09 RX ORDER — ACETAMINOPHEN 325 MG/1
650 TABLET ORAL EVERY 6 HOURS PRN
Status: DISCONTINUED | OUTPATIENT
Start: 2024-02-09 | End: 2024-02-10 | Stop reason: HOSPADM

## 2024-02-09 RX ORDER — INSULIN GLARGINE 100 [IU]/ML
10 INJECTION, SOLUTION SUBCUTANEOUS NIGHTLY
Status: DISCONTINUED | OUTPATIENT
Start: 2024-02-09 | End: 2024-02-10 | Stop reason: HOSPADM

## 2024-02-09 RX ORDER — LATANOPROST 50 UG/ML
1 SOLUTION/ DROPS OPHTHALMIC NIGHTLY
Status: DISCONTINUED | OUTPATIENT
Start: 2024-02-09 | End: 2024-02-10 | Stop reason: HOSPADM

## 2024-02-09 RX ADMIN — ALLOPURINOL 300 MG: 300 TABLET ORAL at 10:30

## 2024-02-09 RX ADMIN — POTASSIUM CHLORIDE 20 MEQ: 1500 TABLET, EXTENDED RELEASE ORAL at 10:28

## 2024-02-09 RX ADMIN — LATANOPROST 1 DROP: 50 SOLUTION OPHTHALMIC at 20:40

## 2024-02-09 RX ADMIN — INSULIN LISPRO 4 UNITS: 100 INJECTION, SOLUTION INTRAVENOUS; SUBCUTANEOUS at 13:26

## 2024-02-09 RX ADMIN — APIXABAN 5 MG: 5 TABLET, FILM COATED ORAL at 10:28

## 2024-02-09 RX ADMIN — INSULIN GLARGINE 10 UNITS: 100 INJECTION, SOLUTION SUBCUTANEOUS at 20:35

## 2024-02-09 RX ADMIN — FUROSEMIDE 40 MG: 10 INJECTION, SOLUTION INTRAMUSCULAR; INTRAVENOUS at 18:22

## 2024-02-09 RX ADMIN — TIMOLOL MALEATE 1 DROP: 5 SOLUTION OPHTHALMIC at 20:35

## 2024-02-09 RX ADMIN — APIXABAN 5 MG: 5 TABLET, FILM COATED ORAL at 20:35

## 2024-02-09 RX ADMIN — DORZOLAMIDE HYDROCHLORIDE 1 DROP: 20 SOLUTION/ DROPS OPHTHALMIC at 20:35

## 2024-02-09 RX ADMIN — FUROSEMIDE 40 MG: 10 INJECTION, SOLUTION INTRAMUSCULAR; INTRAVENOUS at 10:28

## 2024-02-09 RX ADMIN — TIMOLOL MALEATE 1 DROP: 5 SOLUTION OPHTHALMIC at 13:26

## 2024-02-09 RX ADMIN — DORZOLAMIDE HYDROCHLORIDE 1 DROP: 20 SOLUTION/ DROPS OPHTHALMIC at 13:26

## 2024-02-09 RX ADMIN — INSULIN LISPRO 4 UNITS: 100 INJECTION, SOLUTION INTRAVENOUS; SUBCUTANEOUS at 18:22

## 2024-02-09 RX ADMIN — Medication 10 ML: at 20:40

## 2024-02-09 RX ADMIN — DESMOPRESSIN ACETATE 40 MG: 0.2 TABLET ORAL at 20:35

## 2024-02-09 RX ADMIN — PANTOPRAZOLE SODIUM 40 MG: 40 TABLET, DELAYED RELEASE ORAL at 10:30

## 2024-02-09 RX ADMIN — PERFLUTREN 1.5 ML: 6.52 INJECTION, SUSPENSION INTRAVENOUS at 18:22

## 2024-02-09 RX ADMIN — INSULIN LISPRO 4 UNITS: 100 INJECTION, SOLUTION INTRAVENOUS; SUBCUTANEOUS at 10:28

## 2024-02-09 RX ADMIN — ASPIRIN 81 MG: 81 TABLET, COATED ORAL at 10:28

## 2024-02-09 RX ADMIN — INSULIN LISPRO 4 UNITS: 100 INJECTION, SOLUTION INTRAVENOUS; SUBCUTANEOUS at 20:35

## 2024-02-09 NOTE — H&P
V2.0  History and Physical      Name:  Wallace Nieves /Age/Sex: 1950  (74 y.o. male)   MRN & CSN:  [id number] & [id number] Encounter Date/Time: 2024 11:18 PM EST   Location:  /0313-02 PCP: Carlos A Malagon MD       Hospital Day: 1    Assessment and Plan:   Wallace Nieves is a 74 y.o. male with a pmh of CAD (CABG), CHF, history of atrial fibrillation, morbid obesity and JAKE (on CPAP) presenting with bilateral chest pain and shortness of breath on exertion for 2 days.       Hospital Problems             Last Modified POA    * (Principal) Acute on chronic heart failure with normal ejection fraction (HCC) 2024 Yes       1.  Acute on chronic diastolic heart failure: Possibly due to fluid overload.  Change torsemide to IV Lasix twice daily.  Trend troponin.  Order echo.  Monitor chemistry daily.  Consult cardiology.  Wean off oxygen as tolerated.    2.  CAD: Status post CABG.  Continue metoprolol, aspirin and Lipitor.    3.  History of atrial fibrillation: Ventricular rate is controlled with metoprolol.  On Eliquis for stroke prophylaxis.    4.  Morbid obesity/JAKE: BMI is 64.5.  Advised to lose weight.  Continue CPAP at bedtime.    DVT prophylaxis: On Eliquis      Disposition:   Current Living situation: He lives with his wife.  Expected Disposition: Home  Estimated D/C: 2 to 3 days    Diet No diet orders on file   DVT Prophylaxis [] Lovenox, []  Heparin, [] SCDs, [] Ambulation,  [x] Eliquis, [] Xarelto, [] Coumadin   Code Status Prior   Surrogate Decision Maker/ POA      Personally reviewed Lab Studies and Imaging     History from:     patient    History of Present Illness:     Chief Complaint: Chest pain and shortness of breath for 2 days.    Wallace Nieves is a 74 y.o. male with pmh of CAD (CABG), CHF, history of atrial fibrillation, morbid obesity and JAKE (on CPAP) presenting with bilateral chest pain and shortness of breath on exertion for 2 days.     According to the patient, he

## 2024-02-09 NOTE — DISCHARGE INSTRUCTIONS
Heart Failure Resources:  Heart Failure Interactive Workbook:  Go to https://GoGardenitalCCTV Wireless.[employer]/publication/?n=769503 for a Free Heart Failure Interactive Workbook provided by The American Heart Association. This interactive workbook will provide information on Healthier Living with Heart Failure. Please copy and paste link into search bar. Use your mouse to scroll through the pages.    HF [place] serene:   Heart Failure Free smart phone serene available for iPhone and Android download. Use your phone to track sodium intake, fluid intake, symptoms, and weight.     Low Sodium Diet / Recipes:  Go to www.[employer].Viajala website for “renal” diet which is Low Sodium! [employer] is a dialysis company, but this website offers free seasonal cookbooks. Each quarter, they will release 25-30 new recipes with a breakdown of calories, sodium, and glucose. You can also go to wwwJump[employer] Corporation/recipes website for free recipes.     Discharge Instruction Video:  Scan the QR code below with your camera and click the canva.com link to open the video and watch educational information on Heart Failure and Medications from one of our nurses.   https://www.SeatMe/design/DAFZnsH_JRk/4SvabhqQXFGskLJymH1efq/edit    Home Exercise Program:   Identification of Green/Yellow/Red zones:  You should be able to identify when you feel good (green zone), if you have 1-2 symptoms of HF (yellow zone), or if you are in need of medical attention (red zone).  In your CHF education folder you were provided a “stop light tool” to outline this information.     We want to you to rate your exertion levels:    Our therapy team has discussed means of identification with you such as the \"Silvia scale.\"  The Silvia rating scale ranges from 6 to 20, where 6 means \"no exertion at all\" and 20 means \"maximal exertion.\" The goal is to use this to gauge how much effort it is taking for you to do your normal daily tasks.   You should be able to recognize when too much exertion is

## 2024-02-09 NOTE — PROGRESS NOTES
[facility]    Daily Progress Note for 2024 3:53 PM /0313-02  Wallace Nieves : 1950 Age: 74 y.o. Sex: male  Length of Stay:  1    Interval History:      CC: F/U Chest Pain (Chest pain that is worse with deep breathing. Shortness of breath that started last night. Increase swelling in legs. Patient states he has been taking his medication. Ems gave patient 324 baby asa, 125 solumedrol and a duoneb. 18 g left a/c patient refused nitro )      Subjective:       Mr Nieves reports he feels much better since admission. Breathing is improved.       Objective:     Vitals:    24 0432 24 0701 24 1050 24 1519   BP: (!) 139/43 (!) 108/48 (!) 174/79 (!) 171/73   Pulse: 56 56 57 58   Resp: 20 20 20 20   Temp: (!) 96.6 °F (35.9 °C) 97.4 °F (36.3 °C) 97.3 °F (36.3 °C) 97.5 °F (36.4 °C)   TempSrc: Oral Axillary Oral Oral   SpO2: 94% 94% 93% 96%   Weight:       Height:              Intake/Output Summary (Last 24 hours) at 2024 1553  Last data filed at 2024 1529  Gross per 24 hour   Intake 180 ml   Output 2175 ml   Net -1995 ml     Body mass index is 64.54 kg/m².    Physical Exam:  General: Cooperative, pleasant/Ill appearing, on  Nasal cannula  HEENT:  Head: normocephalic,atraumatic, anicteric sclera, clear conjunctiva  Neck: Normal size, Jugular venous pulsations: normal  Respiratory:unlabored breathing, clear to auscultation with no crackles, wheezes rhonchi  Heart: Regular rate and rhythm, S1, S2-normal, No murmurs  Abdomen: soft, nondistended, nontender, normoactive bowel sounds,  Neurological/Psych: Alert and oriented times three, no focal neurological deficits, Mood and affect appropriate.  Skin: No obvious rashes    Extremities:  no edema, Pedal pulses 2+ bilaterally    Scheduled Medications:  allopurinol, 300 mg, Daily  apixaban, 5 mg, BID  aspirin, 81 mg, Daily  latanoprost, 1 drop, Nightly  pantoprazole, 40 mg, QAM AC  potassium chloride, 20 mEq,

## 2024-02-09 NOTE — CONSULTS
[facility] [place]   CONSULTATION  [phone]        Reason for Consultation/Chief Complaint: \"I have been having SOB and CP when taking breath.\"  Cardiology consulted for acute on chronic dCHF per Rena Macdonald MD  I last saw patient 9/11/2023    History of Present Illness:    Wallace Nieves is a 74 y.o. patient who presented to [facility] 2/8/2024 with c/o SOB and CP. He has PMH morbid obesity, CAD s/p LAD DELFINO 3/15 and 2V CABG 5/17, DM, atrial flutter dx 8/23 on eliquis, JAKE on CPAP, hx PE, colon cancer s/p surgery, and OA. Admitted to [facility] 4/25/2017 with SOB and ischemic EKG changes. [facility] 4/27/17 MVCAD. S/P 2V CABG 5/2/17 by Dr. Ramos. Developed A fib/Aflutter post-op. At rehab after d/c developed MRSA RLE where vein harvested for surgery and underwent IV abx, wound vac, dressing changes. 2 week HARLEY 8/29-9/14/17 NSR avg HR 63 bpm, no major issues and d/c'd eliquis.               Presented ER 8/14/23 for right ankle pain and incidentally found AFlutter on EKG. Echo 8/15/23 EF=50-55% (EF=55-60% in 5/17); severe cLVH. EKG 8/15/23 Atrial flutter with variable A-V block; left axis; IVCD; T wave abnormality, consider lateral ischemia.    Now presents with above complaints. Reports increased SOB all the time x 2-3 days. Reports pressure CP only when taking deep breath and leg swelling. He takes torsemide 20mg 2 tablets every AM and is compliant with diuretic and salt/fluid restriction. He arrived on 3L of O2. Only 88% on RA. Admission Testing:  CXR noted stable cardiomegaly and no acute process.  Chest CT noted small pleural effusions; negative PE. Admit EKG aflutter 58; RBBB; LAFB; NST change (no change 9/23). Second EKG slow aflutter 40bpm. Admitting LABS: , K 3.9, BUN/Cr 11/0.9, Pro-BNP 1,522, ALT 11, AST 13, H/H 10.3/31.6 and Irene 33, 34 and 21. COVID and Flu are negative. Patient with no c/o palpitations, dizziness, or orthopnea/PND. I have been asked to provide consultation regarding further management and  CP. He has PMH morbid obesity, CAD s/p LAD DELFINO 3/15 and 2V CABG 5/17, DM, atrial flutter dx 8/23 on eliquis, JAKE on CPAP, hx PE, colon cancer s/p surgery, and OA. Admitted to [facility] 4/25/2017 with SOB and ischemic EKG changes. [facility] 4/27/17 MVCAD. S/P 2V CABG 5/2/17 by Dr. Ramos. Developed A fib/Aflutter post-op. At rehab after d/c developed MRSA RLE where vein harvested for surgery and underwent IV abx, wound vac, dressing changes. 2 week [facility] 8/29-9/14/17 NSR avg HR 63 bpm, no major issues and d/c'd eliquis.               Presented ER 8/14/23 for right ankle pain and incidentally found AFlutter on EKG. Echo 8/15/23 EF=50-55% (EF=55-60% in 5/17); severe cLVH. EKG 8/15/23 Atrial flutter with variable A-V block; left axis; IVCD; T wave abnormality, consider lateral ischemia.    Now presents with above complaints. Reports increased SOB all the time x 2-3 days. Reports pressure CP only when taking deep breath and leg swelling. He takes torsemide 20mg 2 tablets every AM and is compliant with diuretic and salt/fluid restriction. He arrived on 3L of O2. Only 88% on RA. Admission Testing:  CXR noted stable cardiomegaly and no acute process.  Chest CT noted small pleural effusions; negative PE. Admit EKG aflutter 58; RBBB; LAFB; NST change (no change 9/23). Second EKG slow aflutter 40bpm. Admitting LABS: , K 3.9, BUN/Cr 11/0.9, Pro-BNP 1,522, ALT 11, AST 13, H/H 10.3/31.6 and Irene 33, 34 and 21. COVID and Flu are negative.    Diagnosis acute on chronic diastolic CHF, unspecified CP, and bradycardia in older male with multiple medical problems including CAD and aflutter.    Recs:  Continue IV lasix 40mg BID.  Hold Toprol XL today. I decreased dose 50mg to 25mg daily and will resume tomorrow if HR improved.  Limited ECHO pending. See if any evidence for LV depression or wall motion abnl. Consider ischemia testing if any concern. CP sounds pleuritic and atypical.  Continue baby asa, lipitor 20 qd, KCL 20 qd, eliquis 5

## 2024-02-09 NOTE — PROGRESS NOTES
Patient admitted to room 313 from ER. Patient oriented to room, call light, bed rails, phone, lights and bathroom. Patient instructed about the schedule of the day including: vital sign frequency, lab draws, possible tests, frequency of MD and staff rounds, daily weights, I &O's and prescribed diet. PCU Telemetry box in place, patient aware of placement and reason. Bed locked, in lowest position, side rails up 2/4, call light within reach.        Recliner Assessment  Patient is not able to demonstrate the ability to move from a reclining position to an upright position within the recliner due to SOB.       4 Eyes Skin Assessment     NAME:  Wallace Nieves  YOB: 1950  MEDICAL RECORD NUMBER:  [id number]    The patient is being assessed for  Admission    I agree that at least one RN has performed a thorough Head to Toe Skin Assessment on the patient. ALL assessment sites listed below have been assessed.      Areas assessed by both nurses:    Head, Face, Ears, Shoulders, Back, Chest, Arms, Elbows, Hands, Sacrum. Buttock, Coccyx, Ischium, Legs. Feet and Heels, and Under Medical Devices   Pt noted with redness/excoriation to groin/abdomen folds.       Does the Patient have a Wound? No noted wound(s)       Osbaldo Prevention initiated by RN: No  Wound Care Orders initiated by RN: No    Pressure Injury (Stage 3,4, Unstageable, DTI, NWPT, and Complex wounds) if present, place Wound referral order by RN under : No    New Ostomies, if present place, Ostomy referral order under : No     Nurse 1 eSignature: Electronically signed by Margie Rosales RN on 2/8/24 at 11:32 PM EST    **SHARE this note so that the co-signing nurse can place an eSignature**    Nurse 2 eSignature: Electronically signed by Arlette Cordoba RN on 2/9/24 at 1:36 AM EST

## 2024-02-09 NOTE — CARE COORDINATION
Case Management Assessment  Initial Evaluation    Date/Time of Evaluation: 2/9/2024 9:58 AM  Assessment Completed by: Sophia Blas    If patient is discharged prior to next notation, then this note serves as note for discharge by case management.    Patient Name: Wallace Nieves                   YOB: 1950  Diagnosis: Acute on chronic heart failure with normal ejection fraction (HCC) [I50.33]                   Date / Time: 2/8/2024  3:36 PM    Patient Admission Status: Inpatient   Readmission Risk (Low < 19, Mod (19-27), High > 27): Readmission Risk Score: 14.4    Current PCP: Carlos A Malagon MD  PCP verified by CM? Yes    Chart Reviewed: Yes      History Provided by: Patient  Patient Orientation: Alert and Oriented    Patient Cognition: Alert    Hospitalization in the last 30 days (Readmission):  No    If yes, Readmission Assessment in CM Navigator will be completed.    Advance Directives:      Code Status: Full Code   Patient's Primary Decision Maker is: Legal Next of Kin      Discharge Planning:    Patient lives with: Spouse/Significant Other Type of Home: House  Primary Care Giver: Self  Patient Support Systems include: Spouse/Significant Other   Current Financial resources: Medicare  Current community resources: None  Current services prior to admission: None            Current DME:              Type of Home Care services:  None    ADLS  Prior functional level: Independent in ADLs/IADLs  Current functional level: Independent in ADLs/IADLs    PT AM-PAC:   /24  OT AM-PAC:   /24    Family can provide assistance at DC: Yes  Would you like Case Management to discuss the discharge plan with any other family members/significant others, and if so, who? No  Plans to Return to Present Housing: Yes  Other Identified Issues/Barriers to RETURNING to current housing: none   Potential Assistance needed at discharge: N/A            Potential DME:    Patient expects to discharge to: House  Plan for  transportation at discharge:      Financial    Payor: [employer] MEDICARE / Plan: HU[employer] CHOICE-PPO MEDICARE / Product Type: *No Product type* /     Does insurance require precert for SNF: Yes    Potential assistance Purchasing Medications: No  Meds-to-Beds request:        [facility] [id number] - [place], [place] - [address] - P [phone] - F [phone]  [address]  [place] [address]  Phone: [phone] Fax: [phone]    [id number] - [place], [place] - [address]  - P [phone] - F [phone]  [address]   [facility] [address]  Phone: [phone] Fax: [phone]      Notes:    Factors facilitating achievement of predicted outcomes: Family support    Barriers to discharge: none     Additional Case Management Notes: Spoke to pt and wife at bedside and explained role of CM. IPTA from home with wife plan to return home. All DME needs met.  Pt refused HHC at d/c.    The Plan for Transition of Care is related to the following treatment goals of Acute on chronic heart failure with normal ejection fraction (HCC) [I50.33]    IF APPLICABLE: The Patient and/or patient representative Wallace and his family were provided with a choice of provider and agrees with the discharge plan. Freedom of choice list with basic dialogue that supports the patient's individualized plan of care/goals and shares the quality data associated with the providers was provided to:     Patient Representative Name:       The Patient and/or Patient Representative Agree with the Discharge Plan?      Sophia Blas  Case Management Department  Ph: [phone] Fax: [phone]

## 2024-02-09 NOTE — ED PROVIDER NOTES
[facility] Emergency Department      CHIEF COMPLAINT  Chest Pain (Chest pain that is worse with deep breathing. Shortness of breath that started last night. Increase swelling in legs. Patient states he has been taking his medication. Ems gave patient 324 baby asa, 125 solumedrol and a duoneb. 18 g left a/c patient refused nitro )      HISTORY OF PRESENT ILLNESS  Wallace Nieves is a 74 y.o. male with a history of morbid obesity, diabetes, atrial fibrillation anticoagulated on Eliquis also with a history of remote PE, CHF and coronary disease status post two-vessel CABG presents with chest pain and shortness of breath.  He developed chest pain in the middle of the night.  Even minimal exertion just like walking to the bathroom he gets extremely short of breath.  He broke out in a sweat at home when walking to the bathroom and had to sit and rest he was so short of breath.  He has not had a cough or fever.  He has had increased swelling in his legs.  He is on torsemide 20 mg twice a day..   No other complaints, modifying factors or associated symptoms.     History obtained from the patient.    I have reviewed the following from the nursing documentation.    Past Medical History:   Diagnosis Date    Arthritis     Asthma     BiPAP (biphasic positive airway pressure) dependence     CHF (congestive heart failure) (HCC)     Colon cancer ([facility]) 2000    CPAP (continuous positive airway pressure) dependence     Depression     Diabetes mellitus (HCC)     MRSA (methicillin resistant staph aureus) culture positive 06/04/2017    leg culture    PE (pulmonary embolism)     Sleep apnea     Vitamin D deficiency      Past Surgical History:   Procedure Laterality Date    CARDIAC CATHETERIZATION  04/27/2017    Dr. Osorio    COLON SURGERY      CORONARY ANGIOPLASTY WITH STENT PLACEMENT  03/04/2015    mid LAD Promus Premiere 2.75x28    CORONARY ARTERY BYPASS GRAFT  05/02/2017    Coronary artery bypass ×2 reverse saphenous vein

## 2024-02-09 NOTE — PROGRESS NOTES
Medication Reconciliation    List of medications patient is currently taking is complete.  Source of medications in list are [facility].        No current facility-administered medications on file prior to encounter.     Current Outpatient Medications on File Prior to Encounter   Medication Sig Dispense Refill    allopurinol (ZYLOPRIM) 300 MG tablet Take 1 tablet by mouth daily [facility] by [facility].      potassium chloride (MICRO-K) 10 MEQ extended release capsule Take 1 capsule by mouth daily Supp[facility] by [facility].      vitamin D (CHOLECALCIFEROL) 25 MCG (1000 UT) TABS tablet Take 1 tablet by mouth daily Supp[facility] by [facility].      dorzolamide-timolol (COSOPT) 2-0.5 % ophthalmic solution Place 1 drop into both eyes 2 times daily Supplied by [facility].      ferrous sulfate (IRON 325) 325 (65 Fe) MG tablet Take 1 tablet by mouth daily (with breakfast) Supp[facility] by [facility].      insulin NPH (HUMULIN N;NOVOLIN N) 100 UNIT/ML injection vial Inject 30 Units into the skin 2 times daily (before meals) Supp[facility] by [facility].      ondansetron (ZOFRAN) 8 MG tablet Take 1 tablet by mouth every 8 hours as needed for Nausea 10 tablet 0    omeprazole (PRILOSEC OTC) 20 MG tablet Take 2 tablets by mouth daily 30 tablet 3    torsemide (DEMADEX) 20 MG tablet Take 1 tablet by mouth in the morning and at bedtime 180 tablet 3    apixaban (ELIQUIS) 5 MG TABS tablet Take 1 tablet by mouth 2 times daily (Patient taking differently: Take 1 tablet by mouth 2 times daily Supp[facility].) 60 tablet 0    aspirin 81 MG EC tablet Take 1 tablet by mouth daily 30 tablet 3    metoprolol succinate (TOPROL XL) 50 MG extended release tablet Take 1 tablet by mouth daily 30 tablet 3    [DISCONTINUED] allopurinol 200 MG TABS Take 200 mg by mouth daily 30 tablet 2    [DISCONTINUED] potassium chloride (KLOR-CON M) 20 MEQ extended release tablet Take 1 tablet by mouth daily 3 tablet 0    Semaglutide (OZEMPIC, 1 MG/DOSE, SC) Inject 1 mg into the skin once a

## 2024-02-09 NOTE — PROGRESS NOTES
Bedside report and transfer of care given to JULISSA Iniguez . Pt currently resting in bed with the call light within reach. Pt denies any other care needs at this time. Pt stable at this time.

## 2024-02-09 NOTE — CONSULTS
came into hospital due to increased SOB and swelling. Lives at home with spouse who is able to help with adl's. Plans to return home at discharge. Denies any need for HHC.     Provided the Patient with Heart Failure education on: signs/symptoms to monitor, medications, daily weights, low sodium diet, 2000 ml fluid restriction, and activity. Reviewed HF Zones (green/yellow/red) and importance of reporting yellow symptoms on Magnet provided. Patient has a working scale at home and will weigh self 1x per week. Stated he gets upset with daily weights because he has no progress of weight loss. Reinforced the importance of daily weights and to report weight gain of 3 lbs in one day and 5 lbs in one week to Provider. Stated he will continue with 1x a week weights and report if greater than 5 lbs. Provided patient with a paper copy weight log to keep track of daily weights.     Reviewed low sodium diet and fluid restrictions. Patient does eat canned soup and some frozen meals like pot pies. Spouse cooks all meals and is aware to watch for sodium. Does not add additional salt to food.  Provided 32 oz labeled water pitcher to monitor intake of fluids. Patient feels he drinks close to 64 oz but might go over due to soup.     Scheduled follow-up appointment with PCP Dr. Costa on 2/15/2024 at 1130 AM. Provided Heart Failure Nurse resource number at [phone] for any further questions or assistance with resources.     HEART FAILURE MEDICATIONS:    Patient taking a BETA BLOCKER: Yes- Toprol      Patient taking SGLT2: Yes- ozempic weekly injections      Patient taking Diuretic: Yes- Lasix      SCALE AVAILABLE: Yes     CURRENT DIET: ADULT DIET; Regular; Low Sodium (2 gm); 1800 ml    Education:     EDUCATION STATUS: Patient [place] and Spouse Anh  [x]  Provided both written and verbal education on Heart Failure signs & symptoms  [x]  Received verbal acknowledgment/understanding of Heart Failure related causes  [x]  Provided  instructions on daily medications  [x]  Provided instructions to monitor and record weight daily  [x]  Provided instructions to call if weight increases 3 lbs in one day or 5 lbs in one week  [x]  Provided instructions on how to maintain a low sodium diet  [x]  Provided 32 oz labeled water pitcher to monitor intake of fluids  [x]  Provided recommendations on activity and exercise    []  Provided recommendations for smoking cessation programs      EDUCATIONAL MATERIALS PROVIDED:    [x]  tadoÂ°: A Patient Education Guide Living with Heart Failure Booklet  [x]  Heart Failure Zones Self-Check Plan  [x]  Weight and Heart Failure Zone Log  [x]  AHA: HF and Your Ejection Fraction Explained  [x]  Sleep Disorders and Your Heart  [x]  AHA: HF Justin Chavez Aids Patients at Home  [x]  Magnet: Signs of Heart Failure    PATIENT/CAREGIVER TEACHING:   Level of patient/caregiver understanding able to:   [x] Verbalize understanding   [] Demonstrate understanding       [] Teach back        [] Needs reinforcement     []  Other:      TEACHING TIME:  40 minutes       Recommendations:     [x]  Encourage to call Heart Failure Resource Line [phone] with any questions or concerns.  [x]  Encourage follow-up appointment compliance. Next Appointment: 2/15/24 at 1130 AM  [x]  Emphasize daily weights: Instruct patient to call the MD if weight gain of 3 lbs in 1 day or 5 lbs in a week.   [x]  Review sodium restriction diet. Encourage patient to not add table salt and avoid foods high in sodium.   [x]  Educate further on fluid restriction of 48 oz - 64 oz with labeled pitcher during inpatient admission.  [x]  Continue to educate on signs & symptoms of Heart Failure.  []  Other:            Electronically signed by Allegra Vasquez, MSN, RN  on 2/9/2024 at 1:28 PM

## 2024-02-10 ENCOUNTER — HOSPITAL ENCOUNTER (INPATIENT)
Age: 74
LOS: 3 days | Discharge: HOME | DRG: 273 | End: 2024-02-13
Attending: INTERNAL MEDICINE | Admitting: INTERNAL MEDICINE
Payer: MEDICARE

## 2024-02-10 VITALS
HEART RATE: 71 BPM | WEIGHT: 315 LBS | DIASTOLIC BLOOD PRESSURE: 108 MMHG | SYSTOLIC BLOOD PRESSURE: 186 MMHG | HEIGHT: 67 IN | OXYGEN SATURATION: 94 % | RESPIRATION RATE: 18 BRPM | TEMPERATURE: 98 F | BODY MASS INDEX: 49.44 KG/M2

## 2024-02-10 DIAGNOSIS — I50.33 ACUTE ON CHRONIC DIASTOLIC HEART FAILURE (HCC): Primary | ICD-10-CM

## 2024-02-10 PROBLEM — I50.9 HEART FAILURE (HCC): Status: ACTIVE | Noted: 2024-02-10

## 2024-02-10 LAB
ANION GAP SERPL CALCULATED.3IONS-SCNC: 10 MMOL/L (ref 3–16)
BASOPHILS # BLD: 0.1 K/UL (ref 0–0.2)
BASOPHILS NFR BLD: 1 %
BUN SERPL-MCNC: 18 MG/DL (ref 7–20)
CALCIUM SERPL-MCNC: 8.8 MG/DL (ref 8.3–10.6)
CHLORIDE SERPL-SCNC: 103 MMOL/L (ref 99–110)
CHOLEST SERPL-MCNC: 108 MG/DL (ref 0–199)
CO2 SERPL-SCNC: 27 MMOL/L (ref 21–32)
CREAT SERPL-MCNC: 0.7 MG/DL (ref 0.8–1.3)
DEPRECATED RDW RBC AUTO: 16.6 % (ref 12.4–15.4)
EOSINOPHIL # BLD: 0 K/UL (ref 0–0.6)
EOSINOPHIL NFR BLD: 0.3 %
EST. AVERAGE GLUCOSE BLD GHB EST-MCNC: 134.1 MG/DL
GFR SERPLBLD CREATININE-BSD FMLA CKD-EPI: >60 ML/MIN/{1.73_M2}
GLUCOSE BLD-MCNC: 195 MG/DL (ref 70–99)
GLUCOSE BLD-MCNC: 213 MG/DL (ref 70–99)
GLUCOSE BLD-MCNC: 222 MG/DL (ref 70–99)
GLUCOSE BLD-MCNC: 243 MG/DL (ref 70–99)
GLUCOSE SERPL-MCNC: 224 MG/DL (ref 70–99)
HBA1C MFR BLD: 6.3 %
HCT VFR BLD AUTO: 33 % (ref 40.5–52.5)
HDLC SERPL-MCNC: 38 MG/DL (ref 40–60)
HGB BLD-MCNC: 10.6 G/DL (ref 13.5–17.5)
LDLC SERPL CALC-MCNC: 53 MG/DL
LYMPHOCYTES # BLD: 1.4 K/UL (ref 1–5.1)
LYMPHOCYTES NFR BLD: 13.8 %
MAGNESIUM SERPL-MCNC: 2.2 MG/DL (ref 1.8–2.4)
MCH RBC QN AUTO: 29.2 PG (ref 26–34)
MCHC RBC AUTO-ENTMCNC: 32 G/DL (ref 31–36)
MCV RBC AUTO: 91 FL (ref 80–100)
MONOCYTES # BLD: 0.6 K/UL (ref 0–1.3)
MONOCYTES NFR BLD: 5.8 %
NEUTROPHILS # BLD: 8.2 K/UL (ref 1.7–7.7)
NEUTROPHILS NFR BLD: 79.1 %
PERFORMED ON: ABNORMAL
PLATELET # BLD AUTO: 218 K/UL (ref 135–450)
PMV BLD AUTO: 8.1 FL (ref 5–10.5)
POTASSIUM SERPL-SCNC: 4 MMOL/L (ref 3.5–5.1)
RBC # BLD AUTO: 3.63 M/UL (ref 4.2–5.9)
SODIUM SERPL-SCNC: 140 MMOL/L (ref 136–145)
TRIGL SERPL-MCNC: 83 MG/DL (ref 0–150)
TROPONIN, HIGH SENSITIVITY: 32 NG/L (ref 0–22)
VLDLC SERPL CALC-MCNC: 17 MG/DL
WBC # BLD AUTO: 10.4 K/UL (ref 4–11)

## 2024-02-10 PROCEDURE — 84484 ASSAY OF TROPONIN QUANT: CPT

## 2024-02-10 PROCEDURE — 99232 SBSQ HOSP IP/OBS MODERATE 35: CPT | Performed by: STUDENT IN AN ORGANIZED HEALTH CARE EDUCATION/TRAINING PROGRAM

## 2024-02-10 PROCEDURE — 2060000000 HC ICU INTERMEDIATE R&B

## 2024-02-10 PROCEDURE — 6360000002 HC RX W HCPCS: Performed by: STUDENT IN AN ORGANIZED HEALTH CARE EDUCATION/TRAINING PROGRAM

## 2024-02-10 PROCEDURE — 2580000003 HC RX 258: Performed by: STUDENT IN AN ORGANIZED HEALTH CARE EDUCATION/TRAINING PROGRAM

## 2024-02-10 PROCEDURE — 80061 LIPID PANEL: CPT

## 2024-02-10 PROCEDURE — 6370000000 HC RX 637 (ALT 250 FOR IP): Performed by: STUDENT IN AN ORGANIZED HEALTH CARE EDUCATION/TRAINING PROGRAM

## 2024-02-10 PROCEDURE — 6360000002 HC RX W HCPCS: Performed by: INTERNAL MEDICINE

## 2024-02-10 PROCEDURE — 80048 BASIC METABOLIC PNL TOTAL CA: CPT

## 2024-02-10 PROCEDURE — 6370000000 HC RX 637 (ALT 250 FOR IP): Performed by: INTERNAL MEDICINE

## 2024-02-10 PROCEDURE — 99238 HOSP IP/OBS DSCHRG MGMT 30/<: CPT | Performed by: INTERNAL MEDICINE

## 2024-02-10 PROCEDURE — 83735 ASSAY OF MAGNESIUM: CPT

## 2024-02-10 PROCEDURE — 85025 COMPLETE CBC W/AUTO DIFF WBC: CPT

## 2024-02-10 PROCEDURE — 36415 COLL VENOUS BLD VENIPUNCTURE: CPT

## 2024-02-10 RX ORDER — FUROSEMIDE 10 MG/ML
40 INJECTION INTRAMUSCULAR; INTRAVENOUS 2 TIMES DAILY
Status: DISCONTINUED | OUTPATIENT
Start: 2024-02-10 | End: 2024-02-13

## 2024-02-10 RX ORDER — POTASSIUM CHLORIDE 20 MEQ/1
40 TABLET, EXTENDED RELEASE ORAL PRN
Status: DISCONTINUED | OUTPATIENT
Start: 2024-02-10 | End: 2024-02-11

## 2024-02-10 RX ORDER — ATORVASTATIN CALCIUM 40 MG/1
40 TABLET, FILM COATED ORAL NIGHTLY
Status: DISCONTINUED | OUTPATIENT
Start: 2024-02-10 | End: 2024-02-13 | Stop reason: HOSPADM

## 2024-02-10 RX ORDER — ACETAMINOPHEN 650 MG/1
650 SUPPOSITORY RECTAL EVERY 6 HOURS PRN
Status: DISCONTINUED | OUTPATIENT
Start: 2024-02-10 | End: 2024-02-13 | Stop reason: HOSPADM

## 2024-02-10 RX ORDER — ONDANSETRON 2 MG/ML
4 INJECTION INTRAMUSCULAR; INTRAVENOUS EVERY 6 HOURS PRN
Status: DISCONTINUED | OUTPATIENT
Start: 2024-02-10 | End: 2024-02-13 | Stop reason: HOSPADM

## 2024-02-10 RX ORDER — INSULIN GLARGINE 100 [IU]/ML
30 INJECTION, SOLUTION SUBCUTANEOUS NIGHTLY
Status: DISCONTINUED | OUTPATIENT
Start: 2024-02-10 | End: 2024-02-13 | Stop reason: HOSPADM

## 2024-02-10 RX ORDER — SODIUM CHLORIDE 0.9 % (FLUSH) 0.9 %
5-40 SYRINGE (ML) INJECTION PRN
Status: DISCONTINUED | OUTPATIENT
Start: 2024-02-10 | End: 2024-02-13 | Stop reason: HOSPADM

## 2024-02-10 RX ORDER — HYDRALAZINE HYDROCHLORIDE 20 MG/ML
10 INJECTION INTRAMUSCULAR; INTRAVENOUS EVERY 6 HOURS PRN
Status: DISCONTINUED | OUTPATIENT
Start: 2024-02-10 | End: 2024-02-10 | Stop reason: HOSPADM

## 2024-02-10 RX ORDER — METOPROLOL SUCCINATE 50 MG/1
50 TABLET, EXTENDED RELEASE ORAL DAILY
Status: DISCONTINUED | OUTPATIENT
Start: 2024-02-10 | End: 2024-02-10

## 2024-02-10 RX ORDER — INSULIN LISPRO 100 [IU]/ML
0-8 INJECTION, SOLUTION INTRAVENOUS; SUBCUTANEOUS
Status: DISCONTINUED | OUTPATIENT
Start: 2024-02-10 | End: 2024-02-13

## 2024-02-10 RX ORDER — ONDANSETRON 4 MG/1
4 TABLET, ORALLY DISINTEGRATING ORAL EVERY 8 HOURS PRN
Status: DISCONTINUED | OUTPATIENT
Start: 2024-02-10 | End: 2024-02-13 | Stop reason: HOSPADM

## 2024-02-10 RX ORDER — GLUCAGON 1 MG/ML
1 KIT INJECTION PRN
Status: DISCONTINUED | OUTPATIENT
Start: 2024-02-10 | End: 2024-02-13 | Stop reason: HOSPADM

## 2024-02-10 RX ORDER — MAGNESIUM SULFATE IN WATER 40 MG/ML
2000 INJECTION, SOLUTION INTRAVENOUS PRN
Status: DISCONTINUED | OUTPATIENT
Start: 2024-02-10 | End: 2024-02-11

## 2024-02-10 RX ORDER — DEXTROSE MONOHYDRATE 100 MG/ML
INJECTION, SOLUTION INTRAVENOUS CONTINUOUS PRN
Status: DISCONTINUED | OUTPATIENT
Start: 2024-02-10 | End: 2024-02-13 | Stop reason: HOSPADM

## 2024-02-10 RX ORDER — HYDRALAZINE HYDROCHLORIDE 25 MG/1
25 TABLET, FILM COATED ORAL EVERY 8 HOURS SCHEDULED
Status: DISCONTINUED | OUTPATIENT
Start: 2024-02-10 | End: 2024-02-10

## 2024-02-10 RX ORDER — PANTOPRAZOLE SODIUM 40 MG/1
40 TABLET, DELAYED RELEASE ORAL
Status: DISCONTINUED | OUTPATIENT
Start: 2024-02-11 | End: 2024-02-13 | Stop reason: HOSPADM

## 2024-02-10 RX ORDER — INSULIN LISPRO 100 [IU]/ML
0-4 INJECTION, SOLUTION INTRAVENOUS; SUBCUTANEOUS NIGHTLY
Status: DISCONTINUED | OUTPATIENT
Start: 2024-02-10 | End: 2024-02-13

## 2024-02-10 RX ORDER — ACETAMINOPHEN 325 MG/1
650 TABLET ORAL EVERY 6 HOURS PRN
Status: DISCONTINUED | OUTPATIENT
Start: 2024-02-10 | End: 2024-02-13 | Stop reason: HOSPADM

## 2024-02-10 RX ORDER — SODIUM CHLORIDE 9 MG/ML
INJECTION, SOLUTION INTRAVENOUS PRN
Status: DISCONTINUED | OUTPATIENT
Start: 2024-02-10 | End: 2024-02-13 | Stop reason: HOSPADM

## 2024-02-10 RX ORDER — ASPIRIN 81 MG/1
81 TABLET, CHEWABLE ORAL DAILY
Status: DISCONTINUED | OUTPATIENT
Start: 2024-02-10 | End: 2024-02-13 | Stop reason: HOSPADM

## 2024-02-10 RX ORDER — SODIUM CHLORIDE 0.9 % (FLUSH) 0.9 %
5-40 SYRINGE (ML) INJECTION EVERY 12 HOURS SCHEDULED
Status: DISCONTINUED | OUTPATIENT
Start: 2024-02-10 | End: 2024-02-13 | Stop reason: HOSPADM

## 2024-02-10 RX ORDER — POTASSIUM CHLORIDE 7.45 MG/ML
10 INJECTION INTRAVENOUS PRN
Status: DISCONTINUED | OUTPATIENT
Start: 2024-02-10 | End: 2024-02-11

## 2024-02-10 RX ORDER — POLYETHYLENE GLYCOL 3350 17 G/17G
17 POWDER, FOR SOLUTION ORAL DAILY PRN
Status: DISCONTINUED | OUTPATIENT
Start: 2024-02-10 | End: 2024-02-13 | Stop reason: HOSPADM

## 2024-02-10 RX ADMIN — INSULIN GLARGINE 30 UNITS: 100 INJECTION, SOLUTION SUBCUTANEOUS at 20:35

## 2024-02-10 RX ADMIN — INSULIN LISPRO 2 UNITS: 100 INJECTION, SOLUTION INTRAVENOUS; SUBCUTANEOUS at 09:57

## 2024-02-10 RX ADMIN — POTASSIUM CHLORIDE 20 MEQ: 1500 TABLET, EXTENDED RELEASE ORAL at 09:57

## 2024-02-10 RX ADMIN — ASPIRIN 81 MG 81 MG: 81 TABLET ORAL at 20:34

## 2024-02-10 RX ADMIN — TIMOLOL MALEATE 1 DROP: 5 SOLUTION OPHTHALMIC at 09:59

## 2024-02-10 RX ADMIN — FUROSEMIDE 40 MG: 10 INJECTION, SOLUTION INTRAMUSCULAR; INTRAVENOUS at 20:34

## 2024-02-10 RX ADMIN — Medication 10 ML: at 20:34

## 2024-02-10 RX ADMIN — FUROSEMIDE 40 MG: 10 INJECTION, SOLUTION INTRAMUSCULAR; INTRAVENOUS at 09:57

## 2024-02-10 RX ADMIN — PANTOPRAZOLE SODIUM 40 MG: 40 TABLET, DELAYED RELEASE ORAL at 05:03

## 2024-02-10 RX ADMIN — DORZOLAMIDE HYDROCHLORIDE 1 DROP: 20 SOLUTION/ DROPS OPHTHALMIC at 09:59

## 2024-02-10 RX ADMIN — HYDRALAZINE HYDROCHLORIDE 10 MG: 20 INJECTION, SOLUTION INTRAMUSCULAR; INTRAVENOUS at 16:57

## 2024-02-10 RX ADMIN — ATORVASTATIN CALCIUM 40 MG: 40 TABLET, FILM COATED ORAL at 20:35

## 2024-02-10 RX ADMIN — ASPIRIN 81 MG: 81 TABLET, COATED ORAL at 09:57

## 2024-02-10 RX ADMIN — ALLOPURINOL 300 MG: 300 TABLET ORAL at 09:57

## 2024-02-10 NOTE — CONSULTS
Urology Attending Consult Note      Reason for Consultation: Gross hematuria    History: 75 yo M with h/o CABG p/w chest pain and SOB. He is going to be transferred to [facility] for further Cardiac w/u. Pt on xarelto. He ahd a chairez placed upon admissoin. Pt thinks his chairez may have been tugged on, resulting in gross hematuria. Urine is nearly clear at this time. Xarelto has been held.     Family History, Social History, Review of Systems:  Reviewed and agreed to as per chart    Vitals:  BP (!) 144/74   Pulse 59   Temp 97.6 °F (36.4 °C) (Axillary)   Resp 18   Ht 1.702 m (5' 7\")   Wt (!) 195.4 kg (430 lb 12.8 oz)   SpO2 94%   BMI 67.47 kg/m²   Temp  Av.6 °F (36.4 °C)  Min: 97.4 °F (36.3 °C)  Max: 98 °F (36.7 °C)    Intake/Output Summary (Last 24 hours) at 2/10/2024 1358  Last data filed at 2/10/2024 0945  Gross per 24 hour   Intake 600 ml   Output 1850 ml   Net -1250 ml         Physical:  Well developed, well nourished in no acute distress  Mood indicates no abnormalities. Pt doesn’t appear depressed  Orientated to time and place  Chairez draining pink tinged urine      Labs:  WBC:    Lab Results   Component Value Date/Time    WBC 10.4 02/10/2024 05:01 AM     Hemoglobin/Hematocrit:    Lab Results   Component Value Date/Time    HGB 10.6 02/10/2024 05:01 AM    HCT 33.0 02/10/2024 05:01 AM     BMP:    Lab Results   Component Value Date/Time     02/10/2024 05:01 AM    K 4.0 02/10/2024 05:01 AM    K 3.9 2024 04:27 PM     02/10/2024 05:01 AM    CO2 27 02/10/2024 05:01 AM    BUN 18 02/10/2024 05:01 AM    LABALBU 3.6 2024 04:27 PM    CREATININE 0.7 02/10/2024 05:01 AM    CALCIUM 8.8 02/10/2024 05:01 AM    GFRAA >60 2022 09:12 AM    GFRAA >60 2012 08:35 PM    LABGLOM >60 02/10/2024 05:01 AM     PT/INR:    Lab Results   Component Value Date/Time    PROTIME 23.3 07/15/2017 02:52 PM    INR 2.04 07/15/2017 02:52 PM     PTT:    Lab Results   Component Value Date/Time    APTT

## 2024-02-10 NOTE — DISCHARGE SUMMARY
Component Value Date/Time    WBC 10.4 02/10/2024 05:01 AM    HGB 10.6 02/10/2024 05:01 AM    HCT 33.0 02/10/2024 05:01 AM    MCV 91.0 02/10/2024 05:01 AM     02/10/2024 05:01 AM     02/10/2024 05:01 AM    K 4.0 02/10/2024 05:01 AM    K 3.9 02/08/2024 04:27 PM     02/10/2024 05:01 AM    CO2 27 02/10/2024 05:01 AM    BUN 18 02/10/2024 05:01 AM    CREATININE 0.7 02/10/2024 05:01 AM    CALCIUM 8.8 02/10/2024 05:01 AM    PHOS 3.6 06/10/2017 05:40 AM    BNP 15.6 02/09/2012 08:35 PM    ALKPHOS 76 02/08/2024 04:27 PM    ALT 11 02/08/2024 04:27 PM    AST 13 02/08/2024 04:27 PM    BILITOT 1.0 02/08/2024 04:27 PM    LABALBU 3.6 02/08/2024 04:27 PM    LDLCALC 45 06/06/2022 09:12 AM    TRIG 101 10/26/2017 12:00 AM     Lab Results   Component Value Date    INR 2.04 (H) 07/15/2017    INR 1.13 05/16/2017    INR 1.11 12/01/2015       Radiology:  CT CHEST PULMONARY EMBOLISM W CONTRAST    Result Date: 2/8/2024  EXAMINATION: CTA OF THE CHEST 2/8/2024 7:04 pm TECHNIQUE: CTA of the chest was performed after the administration of intravenous contrast.  Multiplanar reformatted images are provided for review.  MIP images are provided for review. Automated exposure control, iterative reconstruction, and/or weight based adjustment of the mA/kV was utilized to reduce the radiation dose to as low as reasonably achievable. COMPARISON: 05/16/2017 HISTORY: ORDERING SYSTEM PROVIDED HISTORY: Shortness of breath. TECHNOLOGIST PROVIDED HISTORY: Reason for exam:->Shortness of breath. Decision Support Exception - unselect if not a suspected or confirmed emergency medical condition->Emergency Medical Condition (MA) Reason for Exam: Shortness of breath. FINDINGS: Pulmonary Arteries: Pulmonary arteries are adequately opacified for evaluation.  No evidence of intraluminal filling defect to suggest pulmonary embolism.  Main pulmonary artery is normal in caliber. Mediastinum: No evidence of mediastinal lymphadenopathy.  The heart and is  mildly enlarged..  There is no acute abnormality of the thoracic aorta. Lungs/pleura: Bilateral pleural effusions.  Bibasilar hypoaeration.  Mild ground-glass opacities.  Trachea and bronchi are patent. Upper Abdomen: Limited images of the upper abdomen are unremarkable. Soft Tissues/Bones: Spondylosis.     1.  No evidence of pulmonary embolism or acute pulmonary abnormality. 2.  Small pleural effusions.     XR CHEST PORTABLE    Result Date: 2/8/2024  EXAMINATION: ONE XRAY VIEW OF THE CHEST 2/8/2024 5:04 pm COMPARISON: Chest x-ray dated September 27, 2023 HISTORY: ORDERING SYSTEM PROVIDED HISTORY: sob TECHNOLOGIST PROVIDED HISTORY: Reason for exam:->sob Reason for Exam: sob FINDINGS: Stable cardiomegaly.  No acute airspace infiltrate.  No pneumothorax or pleural effusion.  Status post median sternotomy     No acute cardiopulmonary findings.  Stable cardiomegaly.         The patient was seen and examined on day of discharge and this discharge summary is in conjunction with any daily progress note from day of discharge.Time Spent on discharge is less than 30 minutes in the examination, evaluation, counseling and review of medications and discharge plan.          Signed:    Lianne Edwards DO   2/10/2024      Thank you Carlos A Malagon MD for the opportunity to be involved in this patient's care. If you have any questions or concerns please feel free to contact me at [facility].

## 2024-02-10 NOTE — PROGRESS NOTES
Patient educated on discharge instructions as well as new medications use, dosage, administration and possible side effects.  Patient verified knowledge. IV left in place. Telemetry monitor removed and returned to [facility]. Pt left facility in stable condition to [facility] room 212 with all of their personal belongings.     Report called to JULISSA Turner at [place].

## 2024-02-10 NOTE — PROGRESS NOTES
[facility]  Progress Note   [phone]        Reason for Consultation/Chief Complaint: \"I have been having SOB and CP when taking breath.\"    Subjective:  Patient seen and examined. Telemetry reviewed which showed Atrial Flutter with variable block and slow ventricular response. He has been off all AV kim agents for more than 48 hours. Discussed with patient that he will need to be evaluated for PPM at [place]. Patient's wife notes he had low heart rates at home since August, and patient was not taking Toprol when his HR was <60. However, she states he quit doing this and just took his beta blocker. Patient tells me has been having increasing fatigue prior to HF exacerbation.      Recommend transfer over the weekend to be evaluated with EP. Will need to continue IV diuresis in the mean time. Patient also with hematuria in chairez bag; which is new.     Discussed above with patient, his wife, hospitalist, and RN.     Past Medical History:   has a past medical history of Arthritis, Asthma, BiPAP (biphasic positive airway pressure) dependence, CHF (congestive heart failure) ([facility]), Colon cancer ([facility]), CPAP (continuous positive airway pressure) dependence, Depression, Diabetes mellitus ([facility]), MRSA (methicillin resistant staph aureus) culture positive, PE (pulmonary embolism), Sleep apnea, and Vitamin D deficiency.    Surgical History:   has a past surgical history that includes Knee Arthroplasty (Left); Colon surgery; hernia repair; Coronary angioplasty with stent (03/04/2015); Total shoulder arthroplasty (Right); Cardiac catheterization (04/27/2017); and Coronary artery bypass graft (05/02/2017).     Social History:   reports that he quit smoking about 9 years ago. His smoking use included cigars and cigarettes. He started smoking about 14 years ago. He has a 1.3 pack-year smoking history. He has never used smokeless tobacco. He reports current alcohol use. He reports current drug use. Drug: Marijuana    Limited ECHO revealed preserved LVEF of 55% without obvious RWMA. Will hold off ischemic testing as patient does not report chest pain to me.   4.   Hematuria noted in chairez bag today; no recent CBC will check. Hold Eliquis. Consult Urology.   5.   HTN uncontrolled, PRN IV hydralazine for now   6.   Continue baby asa, lipitor 20 qd, KCL 20 qd.   7.   Fluid/salt restrict, strict I/O's, daily weights.    Note acute on chronic diastolic CHF and hx CAD increases his morbidity and mortality requiring med tx and testing.     Patient Active Problem List   Diagnosis    Chest pain    Hx pulmonary embolism    Diabetes mellitus type 2 with complications ([facility])    JAKE (obstructive sleep apnea)    Asthma    Depression/anxiety    Suicidal intent    Respiratory distress, acute    Acute diastolic congestive heart failure ([facility])    Dyspnea    Class 3 severe obesity with body mass index (BMI) greater than or equal to 70 in adult ([facility])    Abnormal EKG    CAD s/p CABGx2 (April 2017) & stents (2015)    Glaucoma    Nerve disease, peripheral    Arthritis, degenerative    Mixed hyperlipidemia    Essential hypertension    HCD (hypertensive cardiovascular disease)    Chronic diastolic CHF (congestive heart failure) ([facility])    Paroxysmal A-fib ([facility])    S/P CABG (coronary artery bypass graft)    Typical atrial flutter ([facility])    Hypoglycemia    Chronic normocytic anemia    VERONICA (acute kidney injury) ([facility])    LLL pneumonia ([facility])    Sepsis ([facility])    Seroma of RLE at vein harvest site for CABG    Acute hypoxemic respiratory failure ([facility])    Insomnia due to orthopnea    Orthopnea    Wound infection    Wound dehiscence    Cellulitis    Postoperative infection    History of tobacco abuse    Right ankle pain    Gout    Atrial flutter ([facility])    Atrial fibrillation and flutter ([facility])    CAD in native artery    Acute on chronic diastolic CHF (congestive heart failure) ([facility])    Localized edema    Acute on chronic heart failure with normal ejection fraction

## 2024-02-10 NOTE — PROGRESS NOTES
Bedside report and transfer of care given to JULISSA Matthew. Pt currently resting in bed with the call light within reach. Pt denies any other care needs at this time. Pt stable at this time.

## 2024-02-10 NOTE — H&P
HDL 38 02/10/2024 05:01 AM    HDL 33 04/06/2011 04:05 AM    TRIG 83 02/10/2024 05:01 AM     Hemoglobin A1C:   Lab Results   Component Value Date/Time    LABA1C 6.3 02/09/2024 04:47 AM     TSH:   Lab Results   Component Value Date/Time    TSH 3.77 08/30/2010 05:30 AM     Troponin: No results found for: \"TROPONINT\"  Lactic Acid: No results for input(s): \"LACTA\" in the last 72 hours.  BNP:   Recent Labs     02/08/24  1627   PROBNP 1,522*     UA:  Lab Results   Component Value Date/Time    NITRU Negative 11/05/2019 06:14 PM    COLORU Yellow 11/05/2019 06:14 PM    PHUR 7.0 11/05/2019 06:14 PM    WBCUA 0-2 11/05/2019 06:14 PM    RBCUA None seen 11/05/2019 06:14 PM    MUCUS Rare 11/05/2019 06:14 PM    CLARITYU Clear 11/05/2019 06:14 PM    SPECGRAV 1.015 11/05/2019 06:14 PM    LEUKOCYTESUR Negative 11/05/2019 06:14 PM    UROBILINOGEN 1.0 11/05/2019 06:14 PM    BILIRUBINUR Negative 11/05/2019 06:14 PM    BILIRUBINUR NEGATIVE 05/11/2010 12:02 PM    BLOODU Negative 11/05/2019 06:14 PM    GLUCOSEU Negative 11/05/2019 06:14 PM    GLUCOSEU >=1000 05/11/2010 12:02 PM    KETUA Negative 11/05/2019 06:14 PM    AMORPHOUS Rare 11/05/2019 06:14 PM     Urine Cultures: No results found for: \"LABURIN\"  Blood Cultures:   Lab Results   Component Value Date/Time    BC No growth after 5 days of incubation. 07/15/2017 02:52 PM     Lab Results   Component Value Date/Time    BLOODCULT2 No growth after 5 days of incubation. 07/15/2017 02:52 PM     Organism:   Lab Results   Component Value Date/Time    ORG Staph aureus MRSA 06/04/2017 01:00 AM    ORG Enterococcus faecalis 06/04/2017 01:00 AM       Imaging/Diagnostics Last 24 Hours   CT CHEST PULMONARY EMBOLISM W CONTRAST    Result Date: 2/8/2024  EXAMINATION: CTA OF THE CHEST 2/8/2024 7:04 pm TECHNIQUE: CTA of the chest was performed after the administration of intravenous contrast.  Multiplanar reformatted images are provided for review.  MIP images are provided for review. Automated exposure

## 2024-02-10 NOTE — CARE COORDINATION
CM update: LOS # 2 Patient is to be transferred to [facility] for further Cardiac evaluation.Kim Sood RN

## 2024-02-10 NOTE — PROGRESS NOTES
Bedside report and transfer of care given to JULISSA Iniguez. Pt currently resting in bed with the call light within reach. Pt denies any other care needs at this time. Pt stable at this time.

## 2024-02-10 NOTE — FLOWSHEET NOTE
Pt admitted to A2 - 212 from [facility]. Vitals as below, patient oriented to room, call light, and   MD sent message for admission orders. Wife at bedside       02/10/24 1815   Vital Signs   Temp 97.5 °F (36.4 °C)   Temp Source Oral   Pulse 84   Heart Rate Source Monitor   Respirations 20   BP (!) 157/100   MAP (Calculated) 119   BP Location Right lower arm   BP Method Automatic   Patient Position Sitting   Height and Weight   Height 1.702 m (5' 7\")   Weight - Scale (!) 194.6 kg (429 lb)   Weight Method Standing scale;Actual   BSA (Calculated - sq m) 3.03 sq meters   BMI (Calculated) 67.3

## 2024-02-10 NOTE — PROGRESS NOTES
Nutrition Education    Educated on CHF diet Guidelines  Pt reports he was adding 1 + tsp Baking Soda to a  16oz bottle of water 2 x daily which is ~ 1300 mg of sodium.  He reports he is going to stop doing this.   Learners: Patient  Readiness: Eager  Method: Explanation, Demonstration, and Handout  Response: Verbalizes Understanding  Contact name and number provided.    Magalys Claros RD, LD  Contact Number: [phone]

## 2024-02-11 LAB
ANION GAP SERPL CALCULATED.3IONS-SCNC: 14 MMOL/L (ref 3–16)
BUN SERPL-MCNC: 16 MG/DL (ref 7–20)
CALCIUM SERPL-MCNC: 9 MG/DL (ref 8.3–10.6)
CHLORIDE SERPL-SCNC: 100 MMOL/L (ref 99–110)
CHOLEST SERPL-MCNC: 131 MG/DL (ref 0–199)
CO2 SERPL-SCNC: 24 MMOL/L (ref 21–32)
CREAT SERPL-MCNC: 0.8 MG/DL (ref 0.8–1.3)
DEPRECATED RDW RBC AUTO: 16.1 % (ref 12.4–15.4)
GFR SERPLBLD CREATININE-BSD FMLA CKD-EPI: >60 ML/MIN/{1.73_M2}
GLUCOSE BLD-MCNC: 220 MG/DL (ref 70–99)
GLUCOSE BLD-MCNC: 237 MG/DL (ref 70–99)
GLUCOSE BLD-MCNC: 251 MG/DL (ref 70–99)
GLUCOSE BLD-MCNC: 257 MG/DL (ref 70–99)
GLUCOSE SERPL-MCNC: 198 MG/DL (ref 70–99)
HCT VFR BLD AUTO: 35.6 % (ref 40.5–52.5)
HDLC SERPL-MCNC: 43 MG/DL (ref 40–60)
HGB BLD-MCNC: 11.7 G/DL (ref 13.5–17.5)
LDLC SERPL CALC-MCNC: 67 MG/DL
MAGNESIUM SERPL-MCNC: 2 MG/DL (ref 1.8–2.4)
MCH RBC QN AUTO: 29.6 PG (ref 26–34)
MCHC RBC AUTO-ENTMCNC: 32.8 G/DL (ref 31–36)
MCV RBC AUTO: 90.3 FL (ref 80–100)
PERFORMED ON: ABNORMAL
PHOSPHATE SERPL-MCNC: 3 MG/DL (ref 2.5–4.9)
PLATELET # BLD AUTO: 255 K/UL (ref 135–450)
PMV BLD AUTO: 7.8 FL (ref 5–10.5)
POTASSIUM SERPL-SCNC: 3.7 MMOL/L (ref 3.5–5.1)
RBC # BLD AUTO: 3.94 M/UL (ref 4.2–5.9)
SODIUM SERPL-SCNC: 138 MMOL/L (ref 136–145)
TRIGL SERPL-MCNC: 107 MG/DL (ref 0–150)
TROPONIN, HIGH SENSITIVITY: 27 NG/L (ref 0–22)
TROPONIN, HIGH SENSITIVITY: 33 NG/L (ref 0–22)
TROPONIN, HIGH SENSITIVITY: 35 NG/L (ref 0–22)
TROPONIN, HIGH SENSITIVITY: 36 NG/L (ref 0–22)
VLDLC SERPL CALC-MCNC: 21 MG/DL
WBC # BLD AUTO: 7.3 K/UL (ref 4–11)

## 2024-02-11 PROCEDURE — 2060000000 HC ICU INTERMEDIATE R&B

## 2024-02-11 PROCEDURE — 36415 COLL VENOUS BLD VENIPUNCTURE: CPT

## 2024-02-11 PROCEDURE — 6370000000 HC RX 637 (ALT 250 FOR IP): Performed by: NURSE PRACTITIONER

## 2024-02-11 PROCEDURE — 6370000000 HC RX 637 (ALT 250 FOR IP)

## 2024-02-11 PROCEDURE — 2580000003 HC RX 258: Performed by: STUDENT IN AN ORGANIZED HEALTH CARE EDUCATION/TRAINING PROGRAM

## 2024-02-11 PROCEDURE — 6360000002 HC RX W HCPCS: Performed by: STUDENT IN AN ORGANIZED HEALTH CARE EDUCATION/TRAINING PROGRAM

## 2024-02-11 PROCEDURE — 85027 COMPLETE CBC AUTOMATED: CPT

## 2024-02-11 PROCEDURE — 80048 BASIC METABOLIC PNL TOTAL CA: CPT

## 2024-02-11 PROCEDURE — 6370000000 HC RX 637 (ALT 250 FOR IP): Performed by: STUDENT IN AN ORGANIZED HEALTH CARE EDUCATION/TRAINING PROGRAM

## 2024-02-11 PROCEDURE — 99232 SBSQ HOSP IP/OBS MODERATE 35: CPT | Performed by: NURSE PRACTITIONER

## 2024-02-11 PROCEDURE — 83036 HEMOGLOBIN GLYCOSYLATED A1C: CPT

## 2024-02-11 PROCEDURE — 84100 ASSAY OF PHOSPHORUS: CPT

## 2024-02-11 PROCEDURE — 83735 ASSAY OF MAGNESIUM: CPT

## 2024-02-11 PROCEDURE — 84484 ASSAY OF TROPONIN QUANT: CPT

## 2024-02-11 RX ORDER — DORZOLAMIDE HYDROCHLORIDE AND TIMOLOL MALEATE 20; 5 MG/ML; MG/ML
1 SOLUTION/ DROPS OPHTHALMIC 2 TIMES DAILY
Status: DISCONTINUED | OUTPATIENT
Start: 2024-02-11 | End: 2024-02-13 | Stop reason: HOSPADM

## 2024-02-11 RX ORDER — LISINOPRIL 10 MG/1
10 TABLET ORAL DAILY
Status: DISCONTINUED | OUTPATIENT
Start: 2024-02-11 | End: 2024-02-11

## 2024-02-11 RX ORDER — LATANOPROST 50 UG/ML
1 SOLUTION/ DROPS OPHTHALMIC NIGHTLY
Status: DISCONTINUED | OUTPATIENT
Start: 2024-02-11 | End: 2024-02-13 | Stop reason: HOSPADM

## 2024-02-11 RX ORDER — ALLOPURINOL 300 MG/1
300 TABLET ORAL DAILY
Status: DISCONTINUED | OUTPATIENT
Start: 2024-02-11 | End: 2024-02-13 | Stop reason: HOSPADM

## 2024-02-11 RX ORDER — VITAMIN B COMPLEX
1000 TABLET ORAL DAILY
Status: DISCONTINUED | OUTPATIENT
Start: 2024-02-11 | End: 2024-02-13 | Stop reason: HOSPADM

## 2024-02-11 RX ORDER — VALSARTAN 80 MG/1
80 TABLET ORAL DAILY
Status: DISCONTINUED | OUTPATIENT
Start: 2024-02-11 | End: 2024-02-13 | Stop reason: HOSPADM

## 2024-02-11 RX ADMIN — LATANOPROST 1 DROP: 50 SOLUTION OPHTHALMIC at 20:36

## 2024-02-11 RX ADMIN — INSULIN LISPRO 4 UNITS: 100 INJECTION, SOLUTION INTRAVENOUS; SUBCUTANEOUS at 18:14

## 2024-02-11 RX ADMIN — ALLOPURINOL 300 MG: 300 TABLET ORAL at 10:11

## 2024-02-11 RX ADMIN — ASPIRIN 81 MG 81 MG: 81 TABLET ORAL at 09:06

## 2024-02-11 RX ADMIN — INSULIN LISPRO 2 UNITS: 100 INJECTION, SOLUTION INTRAVENOUS; SUBCUTANEOUS at 09:12

## 2024-02-11 RX ADMIN — PANTOPRAZOLE SODIUM 40 MG: 40 TABLET, DELAYED RELEASE ORAL at 05:01

## 2024-02-11 RX ADMIN — INSULIN LISPRO 2 UNITS: 100 INJECTION, SOLUTION INTRAVENOUS; SUBCUTANEOUS at 13:12

## 2024-02-11 RX ADMIN — APIXABAN 5 MG: 5 TABLET, FILM COATED ORAL at 20:35

## 2024-02-11 RX ADMIN — APIXABAN 5 MG: 5 TABLET, FILM COATED ORAL at 11:39

## 2024-02-11 RX ADMIN — ATORVASTATIN CALCIUM 40 MG: 40 TABLET, FILM COATED ORAL at 20:36

## 2024-02-11 RX ADMIN — DORZOLAMIDE HYDROCHLORIDE AND TIMOLOL MALEATE 1 DROP: 20; 5 SOLUTION/ DROPS OPHTHALMIC at 20:36

## 2024-02-11 RX ADMIN — FUROSEMIDE 40 MG: 10 INJECTION, SOLUTION INTRAMUSCULAR; INTRAVENOUS at 18:13

## 2024-02-11 RX ADMIN — FUROSEMIDE 40 MG: 10 INJECTION, SOLUTION INTRAMUSCULAR; INTRAVENOUS at 09:06

## 2024-02-11 RX ADMIN — Medication 1000 UNITS: at 10:11

## 2024-02-11 RX ADMIN — Medication 10 ML: at 20:36

## 2024-02-11 RX ADMIN — INSULIN GLARGINE 30 UNITS: 100 INJECTION, SOLUTION SUBCUTANEOUS at 20:36

## 2024-02-11 RX ADMIN — VALSARTAN 80 MG: 80 TABLET, FILM COATED ORAL at 10:11

## 2024-02-11 NOTE — CARE COORDINATION
Case Management Assessment  Initial Evaluation    Date/Time of Evaluation: 2/11/2024 3:20 PM  Assessment Completed by: Bette Rios RN    If patient is discharged prior to next notation, then this note serves as note for discharge by case management.    Patient Name: Wallace Nieves                   YOB: 1950  Diagnosis: Heart failure (HCC) [I50.9]                   Date / Time: 2/10/2024  6:25 PM    Patient Admission Status: Inpatient   Readmission Risk (Low < 19, Mod (19-27), High > 27): Readmission Risk Score: 18.8    Current PCP: Carlos A Malagon MD  PCP verified by CM? Yes    Chart Reviewed: Yes      History Provided by: Patient, Spouse  Patient Orientation: Alert and Oriented    Patient Cognition: Alert    Hospitalization in the last 30 days (Readmission):  No    If yes, Readmission Assessment in CM Navigator will be completed.    Advance Directives:      Code Status: Limited   Patient's Primary Decision Maker is: Legal Next of Kin      Discharge Planning:    Patient lives with: Spouse/Significant Other Type of Home: Independent Living  Primary Care Giver: Self  Patient Support Systems include: Spouse/Significant Other   Current Financial resources:    Current community resources: None  Current services prior to admission: C-pap            Current DME:              Type of Home Care services:  None    ADLS  Prior functional level: Independent in ADLs/IADLs  Current functional level: Independent in ADLs/IADLs    PT AM-PAC:   /24  OT AM-PAC:   /24    Family can provide assistance at DC: Yes  Would you like Case Management to discuss the discharge plan with any other family members/significant others, and if so, who?    Plans to Return to Present Housing: Yes  Other Identified Issues/Barriers to RETURNING to current housing: none  Potential Assistance needed at discharge: N/A            Potential DME:    Patient expects to discharge to: House  Plan for transportation at discharge:

## 2024-02-12 ENCOUNTER — ANESTHESIA EVENT (OUTPATIENT)
Dept: CARDIAC CATH/INVASIVE PROCEDURES | Age: 74
DRG: 273 | End: 2024-02-12
Payer: MEDICARE

## 2024-02-12 ENCOUNTER — ANESTHESIA (OUTPATIENT)
Dept: CARDIAC CATH/INVASIVE PROCEDURES | Age: 74
DRG: 273 | End: 2024-02-12
Payer: MEDICARE

## 2024-02-12 ENCOUNTER — APPOINTMENT (OUTPATIENT)
Dept: CARDIAC CATH/INVASIVE PROCEDURES | Age: 74
DRG: 273 | End: 2024-02-12
Attending: INTERNAL MEDICINE
Payer: MEDICARE

## 2024-02-12 LAB
ANION GAP SERPL CALCULATED.3IONS-SCNC: 10 MMOL/L (ref 3–16)
BUN SERPL-MCNC: 15 MG/DL (ref 7–20)
CALCIUM SERPL-MCNC: 8.8 MG/DL (ref 8.3–10.6)
CHLORIDE SERPL-SCNC: 101 MMOL/L (ref 99–110)
CO2 SERPL-SCNC: 26 MMOL/L (ref 21–32)
CREAT SERPL-MCNC: 0.8 MG/DL (ref 0.8–1.3)
DEPRECATED RDW RBC AUTO: 16.2 % (ref 12.4–15.4)
EST. AVERAGE GLUCOSE BLD GHB EST-MCNC: 145.6 MG/DL
GFR SERPLBLD CREATININE-BSD FMLA CKD-EPI: >60 ML/MIN/{1.73_M2}
GLUCOSE BLD-MCNC: 207 MG/DL (ref 70–99)
GLUCOSE BLD-MCNC: 237 MG/DL (ref 70–99)
GLUCOSE BLD-MCNC: 241 MG/DL (ref 70–99)
GLUCOSE BLD-MCNC: 368 MG/DL (ref 70–99)
GLUCOSE SERPL-MCNC: 206 MG/DL (ref 70–99)
HBA1C MFR BLD: 6.7 %
HCT VFR BLD AUTO: 34.5 % (ref 40.5–52.5)
HGB BLD-MCNC: 11.3 G/DL (ref 13.5–17.5)
MAGNESIUM SERPL-MCNC: 1.9 MG/DL (ref 1.8–2.4)
MCH RBC QN AUTO: 29.4 PG (ref 26–34)
MCHC RBC AUTO-ENTMCNC: 32.7 G/DL (ref 31–36)
MCV RBC AUTO: 90 FL (ref 80–100)
PERFORMED ON: ABNORMAL
PHOSPHATE SERPL-MCNC: 3.4 MG/DL (ref 2.5–4.9)
PLATELET # BLD AUTO: 252 K/UL (ref 135–450)
PMV BLD AUTO: 7.5 FL (ref 5–10.5)
POTASSIUM SERPL-SCNC: 3.3 MMOL/L (ref 3.5–5.1)
RBC # BLD AUTO: 3.83 M/UL (ref 4.2–5.9)
SODIUM SERPL-SCNC: 137 MMOL/L (ref 136–145)
TROPONIN, HIGH SENSITIVITY: 39 NG/L (ref 0–22)
TROPONIN, HIGH SENSITIVITY: 80 NG/L (ref 0–22)
WBC # BLD AUTO: 7 K/UL (ref 4–11)

## 2024-02-12 PROCEDURE — 2720000010 HC SURG SUPPLY STERILE: Performed by: INTERNAL MEDICINE

## 2024-02-12 PROCEDURE — 6360000002 HC RX W HCPCS: Performed by: NURSE ANESTHETIST, CERTIFIED REGISTERED

## 2024-02-12 PROCEDURE — 84100 ASSAY OF PHOSPHORUS: CPT

## 2024-02-12 PROCEDURE — 7100000001 HC PACU RECOVERY - ADDTL 15 MIN: Performed by: ANESTHESIOLOGY

## 2024-02-12 PROCEDURE — 93623 PRGRMD STIMJ&PACG IV RX NFS: CPT | Performed by: INTERNAL MEDICINE

## 2024-02-12 PROCEDURE — 93312 ECHO TRANSESOPHAGEAL: CPT

## 2024-02-12 PROCEDURE — 93005 ELECTROCARDIOGRAM TRACING: CPT | Performed by: INTERNAL MEDICINE

## 2024-02-12 PROCEDURE — C1894 INTRO/SHEATH, NON-LASER: HCPCS | Performed by: INTERNAL MEDICINE

## 2024-02-12 PROCEDURE — 2580000003 HC RX 258: Performed by: NURSE ANESTHETIST, CERTIFIED REGISTERED

## 2024-02-12 PROCEDURE — 36415 COLL VENOUS BLD VENIPUNCTURE: CPT

## 2024-02-12 PROCEDURE — C1759 CATH, INTRA ECHOCARDIOGRAPHY: HCPCS | Performed by: INTERNAL MEDICINE

## 2024-02-12 PROCEDURE — 93325 DOPPLER ECHO COLOR FLOW MAPG: CPT

## 2024-02-12 PROCEDURE — 2709999900 HC NON-CHARGEABLE SUPPLY: Performed by: INTERNAL MEDICINE

## 2024-02-12 PROCEDURE — C1732 CATH, EP, DIAG/ABL, 3D/VECT: HCPCS | Performed by: INTERNAL MEDICINE

## 2024-02-12 PROCEDURE — 84484 ASSAY OF TROPONIN QUANT: CPT

## 2024-02-12 PROCEDURE — 83735 ASSAY OF MAGNESIUM: CPT

## 2024-02-12 PROCEDURE — 02583ZZ DESTRUCTION OF CONDUCTION MECHANISM, PERCUTANEOUS APPROACH: ICD-10-PCS | Performed by: INTERNAL MEDICINE

## 2024-02-12 PROCEDURE — C1893 INTRO/SHEATH, FIXED,NON-PEEL: HCPCS | Performed by: INTERNAL MEDICINE

## 2024-02-12 PROCEDURE — 2060000000 HC ICU INTERMEDIATE R&B

## 2024-02-12 PROCEDURE — 2580000003 HC RX 258

## 2024-02-12 PROCEDURE — 99223 1ST HOSP IP/OBS HIGH 75: CPT | Performed by: INTERNAL MEDICINE

## 2024-02-12 PROCEDURE — 93662 INTRACARDIAC ECG (ICE): CPT | Performed by: INTERNAL MEDICINE

## 2024-02-12 PROCEDURE — 6370000000 HC RX 637 (ALT 250 FOR IP): Performed by: STUDENT IN AN ORGANIZED HEALTH CARE EDUCATION/TRAINING PROGRAM

## 2024-02-12 PROCEDURE — 85027 COMPLETE CBC AUTOMATED: CPT

## 2024-02-12 PROCEDURE — 6360000002 HC RX W HCPCS: Performed by: STUDENT IN AN ORGANIZED HEALTH CARE EDUCATION/TRAINING PROGRAM

## 2024-02-12 PROCEDURE — 3700000001 HC ADD 15 MINUTES (ANESTHESIA): Performed by: ANESTHESIOLOGY

## 2024-02-12 PROCEDURE — 2580000003 HC RX 258: Performed by: INTERNAL MEDICINE

## 2024-02-12 PROCEDURE — 2500000003 HC RX 250 WO HCPCS: Performed by: NURSE ANESTHETIST, CERTIFIED REGISTERED

## 2024-02-12 PROCEDURE — 7100000000 HC PACU RECOVERY - FIRST 15 MIN: Performed by: ANESTHESIOLOGY

## 2024-02-12 PROCEDURE — 93653 COMPRE EP EVAL TX SVT: CPT | Performed by: INTERNAL MEDICINE

## 2024-02-12 PROCEDURE — 6370000000 HC RX 637 (ALT 250 FOR IP)

## 2024-02-12 PROCEDURE — 3700000000 HC ANESTHESIA ATTENDED CARE: Performed by: ANESTHESIOLOGY

## 2024-02-12 PROCEDURE — 6360000002 HC RX W HCPCS

## 2024-02-12 PROCEDURE — 2580000003 HC RX 258: Performed by: STUDENT IN AN ORGANIZED HEALTH CARE EDUCATION/TRAINING PROGRAM

## 2024-02-12 PROCEDURE — 93653 COMPRE EP EVAL TX SVT: CPT

## 2024-02-12 PROCEDURE — 2700000000 HC OXYGEN THERAPY PER DAY

## 2024-02-12 PROCEDURE — C1730 CATH, EP, 19 OR FEW ELECT: HCPCS | Performed by: INTERNAL MEDICINE

## 2024-02-12 PROCEDURE — 80048 BASIC METABOLIC PNL TOTAL CA: CPT

## 2024-02-12 PROCEDURE — 2500000003 HC RX 250 WO HCPCS

## 2024-02-12 PROCEDURE — 94761 N-INVAS EAR/PLS OXIMETRY MLT: CPT

## 2024-02-12 PROCEDURE — 99232 SBSQ HOSP IP/OBS MODERATE 35: CPT | Performed by: NURSE PRACTITIONER

## 2024-02-12 PROCEDURE — 6370000000 HC RX 637 (ALT 250 FOR IP): Performed by: NURSE PRACTITIONER

## 2024-02-12 PROCEDURE — 93320 DOPPLER ECHO COMPLETE: CPT

## 2024-02-12 PROCEDURE — 6370000000 HC RX 637 (ALT 250 FOR IP): Performed by: INTERNAL MEDICINE

## 2024-02-12 PROCEDURE — 02K83ZZ MAP CONDUCTION MECHANISM, PERCUTANEOUS APPROACH: ICD-10-PCS | Performed by: INTERNAL MEDICINE

## 2024-02-12 RX ORDER — MEPERIDINE HYDROCHLORIDE 50 MG/ML
12.5 INJECTION INTRAMUSCULAR; INTRAVENOUS; SUBCUTANEOUS EVERY 5 MIN PRN
Status: DISCONTINUED | OUTPATIENT
Start: 2024-02-12 | End: 2024-02-12 | Stop reason: HOSPADM

## 2024-02-12 RX ORDER — PROPOFOL 10 MG/ML
INJECTION, EMULSION INTRAVENOUS PRN
Status: DISCONTINUED | OUTPATIENT
Start: 2024-02-12 | End: 2024-02-12 | Stop reason: SDUPTHER

## 2024-02-12 RX ORDER — SODIUM CHLORIDE 0.9 % (FLUSH) 0.9 %
5-40 SYRINGE (ML) INJECTION PRN
Status: DISCONTINUED | OUTPATIENT
Start: 2024-02-12 | End: 2024-02-13 | Stop reason: HOSPADM

## 2024-02-12 RX ORDER — ONDANSETRON 2 MG/ML
4 INJECTION INTRAMUSCULAR; INTRAVENOUS
Status: DISCONTINUED | OUTPATIENT
Start: 2024-02-12 | End: 2024-02-12 | Stop reason: HOSPADM

## 2024-02-12 RX ORDER — POTASSIUM CHLORIDE 20 MEQ/1
40 TABLET, EXTENDED RELEASE ORAL ONCE
Status: COMPLETED | OUTPATIENT
Start: 2024-02-12 | End: 2024-02-12

## 2024-02-12 RX ORDER — DIPHENHYDRAMINE HYDROCHLORIDE 50 MG/ML
12.5 INJECTION INTRAMUSCULAR; INTRAVENOUS
Status: DISCONTINUED | OUTPATIENT
Start: 2024-02-12 | End: 2024-02-12 | Stop reason: HOSPADM

## 2024-02-12 RX ORDER — LABETALOL HYDROCHLORIDE 5 MG/ML
5 INJECTION, SOLUTION INTRAVENOUS EVERY 10 MIN PRN
Status: DISCONTINUED | OUTPATIENT
Start: 2024-02-12 | End: 2024-02-12 | Stop reason: HOSPADM

## 2024-02-12 RX ORDER — SODIUM CHLORIDE 0.9 % (FLUSH) 0.9 %
5-40 SYRINGE (ML) INJECTION EVERY 12 HOURS SCHEDULED
Status: CANCELLED | OUTPATIENT
Start: 2024-02-12

## 2024-02-12 RX ORDER — PHENYLEPHRINE HCL IN 0.9% NACL 1 MG/10 ML
SYRINGE (ML) INTRAVENOUS PRN
Status: DISCONTINUED | OUTPATIENT
Start: 2024-02-12 | End: 2024-02-12 | Stop reason: SDUPTHER

## 2024-02-12 RX ORDER — DEXAMETHASONE SODIUM PHOSPHATE 10 MG/ML
INJECTION INTRAMUSCULAR; INTRAVENOUS PRN
Status: DISCONTINUED | OUTPATIENT
Start: 2024-02-12 | End: 2024-02-12 | Stop reason: SDUPTHER

## 2024-02-12 RX ORDER — SODIUM CHLORIDE 9 MG/ML
INJECTION, SOLUTION INTRAVENOUS PRN
Status: DISCONTINUED | OUTPATIENT
Start: 2024-02-12 | End: 2024-02-13 | Stop reason: HOSPADM

## 2024-02-12 RX ORDER — OXYCODONE HYDROCHLORIDE 5 MG/1
10 TABLET ORAL PRN
Status: DISCONTINUED | OUTPATIENT
Start: 2024-02-12 | End: 2024-02-12 | Stop reason: HOSPADM

## 2024-02-12 RX ORDER — SODIUM CHLORIDE 0.9 % (FLUSH) 0.9 %
5-40 SYRINGE (ML) INJECTION EVERY 12 HOURS SCHEDULED
Status: DISCONTINUED | OUTPATIENT
Start: 2024-02-12 | End: 2024-02-13 | Stop reason: HOSPADM

## 2024-02-12 RX ORDER — SODIUM CHLORIDE 9 MG/ML
INJECTION, SOLUTION INTRAVENOUS PRN
Status: CANCELLED | OUTPATIENT
Start: 2024-02-12

## 2024-02-12 RX ORDER — ACETAMINOPHEN 325 MG/1
650 TABLET ORAL EVERY 4 HOURS PRN
Status: DISCONTINUED | OUTPATIENT
Start: 2024-02-12 | End: 2024-02-12 | Stop reason: SDUPTHER

## 2024-02-12 RX ORDER — METOPROLOL SUCCINATE 25 MG/1
12.5 TABLET, EXTENDED RELEASE ORAL DAILY
Status: DISCONTINUED | OUTPATIENT
Start: 2024-02-12 | End: 2024-02-13 | Stop reason: HOSPADM

## 2024-02-12 RX ORDER — SODIUM CHLORIDE 0.9 % (FLUSH) 0.9 %
5-40 SYRINGE (ML) INJECTION PRN
Status: CANCELLED | OUTPATIENT
Start: 2024-02-12

## 2024-02-12 RX ORDER — SPIRONOLACTONE 25 MG/1
25 TABLET ORAL DAILY
Status: DISCONTINUED | OUTPATIENT
Start: 2024-02-13 | End: 2024-02-13 | Stop reason: HOSPADM

## 2024-02-12 RX ORDER — OXYCODONE HYDROCHLORIDE 5 MG/1
5 TABLET ORAL PRN
Status: DISCONTINUED | OUTPATIENT
Start: 2024-02-12 | End: 2024-02-12 | Stop reason: HOSPADM

## 2024-02-12 RX ORDER — ROCURONIUM BROMIDE 10 MG/ML
INJECTION, SOLUTION INTRAVENOUS PRN
Status: DISCONTINUED | OUTPATIENT
Start: 2024-02-12 | End: 2024-02-12 | Stop reason: SDUPTHER

## 2024-02-12 RX ORDER — FENTANYL CITRATE 50 UG/ML
INJECTION, SOLUTION INTRAMUSCULAR; INTRAVENOUS PRN
Status: DISCONTINUED | OUTPATIENT
Start: 2024-02-12 | End: 2024-02-12 | Stop reason: SDUPTHER

## 2024-02-12 RX ORDER — ONDANSETRON 2 MG/ML
INJECTION INTRAMUSCULAR; INTRAVENOUS PRN
Status: DISCONTINUED | OUTPATIENT
Start: 2024-02-12 | End: 2024-02-12 | Stop reason: SDUPTHER

## 2024-02-12 RX ORDER — OXYCODONE HYDROCHLORIDE AND ACETAMINOPHEN 5; 325 MG/1; MG/1
1 TABLET ORAL EVERY 4 HOURS PRN
Status: DISCONTINUED | OUTPATIENT
Start: 2024-02-12 | End: 2024-02-13 | Stop reason: HOSPADM

## 2024-02-12 RX ORDER — SODIUM CHLORIDE 9 MG/ML
INJECTION, SOLUTION INTRAVENOUS CONTINUOUS PRN
Status: DISCONTINUED | OUTPATIENT
Start: 2024-02-12 | End: 2024-02-12 | Stop reason: SDUPTHER

## 2024-02-12 RX ORDER — SUCCINYLCHOLINE/SOD CL,ISO/PF 100 MG/5ML
SYRINGE (ML) INTRAVENOUS PRN
Status: DISCONTINUED | OUTPATIENT
Start: 2024-02-12 | End: 2024-02-12 | Stop reason: SDUPTHER

## 2024-02-12 RX ADMIN — SUGAMMADEX 400 MG: 100 INJECTION, SOLUTION INTRAVENOUS at 12:41

## 2024-02-12 RX ADMIN — ALLOPURINOL 300 MG: 300 TABLET ORAL at 09:16

## 2024-02-12 RX ADMIN — APIXABAN 5 MG: 5 TABLET, FILM COATED ORAL at 09:16

## 2024-02-12 RX ADMIN — ROCURONIUM BROMIDE 30 MG: 50 INJECTION, SOLUTION INTRAVENOUS at 11:51

## 2024-02-12 RX ADMIN — Medication 200 MG: at 10:37

## 2024-02-12 RX ADMIN — METOPROLOL SUCCINATE 12.5 MG: 25 TABLET, EXTENDED RELEASE ORAL at 14:46

## 2024-02-12 RX ADMIN — ROCURONIUM BROMIDE 65 MG: 50 INJECTION, SOLUTION INTRAVENOUS at 10:52

## 2024-02-12 RX ADMIN — FUROSEMIDE 40 MG: 10 INJECTION, SOLUTION INTRAMUSCULAR; INTRAVENOUS at 17:20

## 2024-02-12 RX ADMIN — Medication 100 MCG: at 10:43

## 2024-02-12 RX ADMIN — PROPOFOL 50 MG: 10 INJECTION, EMULSION INTRAVENOUS at 10:51

## 2024-02-12 RX ADMIN — Medication 100 MCG: at 10:49

## 2024-02-12 RX ADMIN — PROPOFOL 50 MG: 10 INJECTION, EMULSION INTRAVENOUS at 12:30

## 2024-02-12 RX ADMIN — DORZOLAMIDE HYDROCHLORIDE AND TIMOLOL MALEATE 1 DROP: 20; 5 SOLUTION/ DROPS OPHTHALMIC at 20:24

## 2024-02-12 RX ADMIN — FENTANYL CITRATE 50 MCG: 50 INJECTION, SOLUTION INTRAMUSCULAR; INTRAVENOUS at 12:17

## 2024-02-12 RX ADMIN — Medication 10 ML: at 20:29

## 2024-02-12 RX ADMIN — Medication 100 MCG: at 11:04

## 2024-02-12 RX ADMIN — DEXAMETHASONE SODIUM PHOSPHATE 10 MG: 10 INJECTION INTRAMUSCULAR; INTRAVENOUS at 12:17

## 2024-02-12 RX ADMIN — APIXABAN 5 MG: 5 TABLET, FILM COATED ORAL at 20:24

## 2024-02-12 RX ADMIN — Medication 10 ML: at 09:17

## 2024-02-12 RX ADMIN — FENTANYL CITRATE 50 MCG: 50 INJECTION, SOLUTION INTRAMUSCULAR; INTRAVENOUS at 10:37

## 2024-02-12 RX ADMIN — POTASSIUM CHLORIDE 40 MEQ: 1500 TABLET, EXTENDED RELEASE ORAL at 15:14

## 2024-02-12 RX ADMIN — PROPOFOL 50 MG: 10 INJECTION, EMULSION INTRAVENOUS at 12:19

## 2024-02-12 RX ADMIN — Medication 1000 UNITS: at 09:16

## 2024-02-12 RX ADMIN — INSULIN LISPRO 4 UNITS: 100 INJECTION, SOLUTION INTRAVENOUS; SUBCUTANEOUS at 20:24

## 2024-02-12 RX ADMIN — PANTOPRAZOLE SODIUM 40 MG: 40 TABLET, DELAYED RELEASE ORAL at 05:36

## 2024-02-12 RX ADMIN — Medication 10 ML: at 20:24

## 2024-02-12 RX ADMIN — ASPIRIN 81 MG 81 MG: 81 TABLET ORAL at 09:16

## 2024-02-12 RX ADMIN — PHENYLEPHRINE HYDROCHLORIDE 40 MCG/MIN: 10 INJECTION INTRAVENOUS at 11:16

## 2024-02-12 RX ADMIN — Medication 100 MCG: at 11:09

## 2024-02-12 RX ADMIN — DORZOLAMIDE HYDROCHLORIDE AND TIMOLOL MALEATE 1 DROP: 20; 5 SOLUTION/ DROPS OPHTHALMIC at 09:21

## 2024-02-12 RX ADMIN — PROPOFOL 250 MG: 10 INJECTION, EMULSION INTRAVENOUS at 10:37

## 2024-02-12 RX ADMIN — LATANOPROST 1 DROP: 50 SOLUTION OPHTHALMIC at 20:24

## 2024-02-12 RX ADMIN — ATORVASTATIN CALCIUM 40 MG: 40 TABLET, FILM COATED ORAL at 20:24

## 2024-02-12 RX ADMIN — INSULIN GLARGINE 30 UNITS: 100 INJECTION, SOLUTION SUBCUTANEOUS at 20:25

## 2024-02-12 RX ADMIN — INSULIN LISPRO 2 UNITS: 100 INJECTION, SOLUTION INTRAVENOUS; SUBCUTANEOUS at 17:20

## 2024-02-12 RX ADMIN — SODIUM CHLORIDE: 9 INJECTION, SOLUTION INTRAVENOUS at 10:27

## 2024-02-12 RX ADMIN — ONDANSETRON 4 MG: 2 INJECTION INTRAMUSCULAR; INTRAVENOUS at 12:17

## 2024-02-12 RX ADMIN — INSULIN LISPRO 2 UNITS: 100 INJECTION, SOLUTION INTRAVENOUS; SUBCUTANEOUS at 09:16

## 2024-02-12 RX ADMIN — VALSARTAN 80 MG: 80 TABLET, FILM COATED ORAL at 09:16

## 2024-02-12 RX ADMIN — Medication 100 MCG: at 10:41

## 2024-02-12 RX ADMIN — ROCURONIUM BROMIDE 5 MG: 50 INJECTION, SOLUTION INTRAVENOUS at 10:37

## 2024-02-12 RX ADMIN — Medication 200 MCG: at 11:14

## 2024-02-12 RX ADMIN — Medication 200 MCG: at 11:22

## 2024-02-12 NOTE — ANESTHESIA POSTPROCEDURE EVALUATION
Department of Anesthesiology  Postprocedure Note    Patient: Wallace Nieves  MRN: [id number]  YOB: 1950  Date of evaluation: 2/12/2024    Procedure Summary       Date: 02/12/24 Room / Location: [facility] Cardiac Cath Lab    Anesthesia Start: 1031 Anesthesia Stop: 1307    Procedure: ABLATION Diagnosis:     Scheduled Providers:  Responsible Provider: Gregory Long MD    Anesthesia Type: general ASA Status: 4            Anesthesia Type: No value filed.    Yaneth Phase I: Yaneth Score: 8    Yaneth Phase II:      Anesthesia Post Evaluation    Patient location during evaluation: PACU  Patient participation: complete - patient participated  Level of consciousness: awake and alert  Pain score: 0  Airway patency: patent  Nausea & Vomiting: no nausea and no vomiting  Cardiovascular status: blood pressure returned to baseline  Respiratory status: acceptable  Hydration status: stable  Pain management: adequate    No notable events documented.

## 2024-02-12 NOTE — CARE COORDINATION
Urology, cardiology, Internal Medicine following. Had DELPHINE . He underwent  aflutter ablation today with EP . IPTA. Following for needs/barriers.

## 2024-02-12 NOTE — ANESTHESIA PRE PROCEDURE
Department of Anesthesiology  Preprocedure Note       Name:  Wallace Nieves   Age:  74 y.o.  :  1950                                          MRN:  [id number]         Date:  2024      Surgeon: * No surgeons listed *    Procedure: * No procedures listed *    Medications prior to admission:   Prior to Admission medications    Medication Sig Start Date End Date Taking? Authorizing Provider   allopurinol (ZYLOPRIM) 300 MG tablet Take 1 tablet by mouth daily [facility]    Danay Wylie MD   potassium chloride (MICRO-K) 10 MEQ extended release capsule Take 2 capsules by mouth daily [facility].    Danay Wylie MD   vitamin D (CHOLECALCIFEROL) 25 MCG (1000 UT) TABS tablet Take 1 tablet by mouth daily [facility].    Danay Wylie MD   dorzolamide-timolol (COSOPT) 2-0.5 % ophthalmic solution Place 1 drop into both eyes 2 times daily [facility].    Danay Wylie MD   ferrous sulfate (IRON 325) 325 (65 Fe) MG tablet Take 1 tablet by mouth daily (with breakfast) [facility] by [facility].  Patient not taking: Reported on 2/10/2024    Danay Wylie MD   insulin NPH (HUMULIN N;NOVOLIN N) 100 UNIT/ML injection vial Inject 30 Units into the skin 2 times daily (before meals) [facility].  20 units morning and night, 15 units at lunch time    Danay Wylie MD   ondansetron (ZOFRAN) 8 MG tablet Take 1 tablet by mouth every 8 hours as needed for Nausea  Patient not taking: Reported on 2/10/2024 9/27/23   Nolan Small MD   omeprazole (PRILOSEC OTC) 20 MG tablet Take 2 tablets by mouth daily  Patient not taking: Reported on 2/10/2024 9/27/23   Nolan Small MD   torsemide (DEMADEX) 20 MG tablet Take 1 tablet by mouth in the morning and at bedtime  Patient taking differently: Take 2 tablets by mouth daily 23   Onel López MD   apixaban (ELIQUIS) 5 MG TABS tablet Take 1 tablet by mouth 2 times daily  Patient taking differently: Take 1

## 2024-02-12 NOTE — PROCEDURES
[facility]  Electrophysiology Study Report    Procedures Atrial flutter ablation    3D mapping  Electrophysiology study    Attending   Dandre Lang MD    Indications  Typical atrial flutter with slow ventricular response    Description of Procedure   After informed consents for sedation and the ablation procedure were obtained, the patient was brought to the clinical electrophysiology laboratory in the fasting and nonsedated state.  ECG and vital signs, including SpO2, were monitored.  General sedation was administered by anesthesia services.  DELPHINE was performed.  The right groin and left groin were prepared and draped in a sterile fashion.  Buffered lidocaine was used for local analgesia. Using the modified Seldinger technique, the following sheaths and catheters were inserted under ultrasound guidance (difficult access given patient's mass).    A 6 F deflectable multipolar catheter was inserted through a 6 F left femoral vein sheath and advanced to the region of the clement terminalis.  A 8.5 F ICE catheter was inserted through a 9 F left femoral vein sheath and advanced to the region of the His region.  A 7 F deflectable decanav catheter was inserted through a 8 F right femoral vein sheath and advanced to the region of the coronary sinus.  A 7 F ablation catheter with an 4 mm tip was inserted through an 8 F right femoral vein sheath and advanced to the cavotricuspid isthmus for mapping and ablation.      The patient arrived in the EP lab in atrial flutter.  After determining that the cavotricuspid isthmus was in the circuit, the decision to proceed with the cavotricuspid isthmus ablation was made. The ablation catheter was positioned across the isthmus, and a linear ablation was performed from the AV groove through the cavotricuspid isthmus to the insertion of the inferior vena cava.  During the application of energy, atrial flutter was terminated.  Pacing was performed at both sides of the isthmus

## 2024-02-13 VITALS
TEMPERATURE: 97.3 F | HEIGHT: 67 IN | SYSTOLIC BLOOD PRESSURE: 119 MMHG | DIASTOLIC BLOOD PRESSURE: 77 MMHG | OXYGEN SATURATION: 98 % | HEART RATE: 87 BPM | RESPIRATION RATE: 18 BRPM | WEIGHT: 315 LBS | BODY MASS INDEX: 49.44 KG/M2

## 2024-02-13 LAB
ANION GAP SERPL CALCULATED.3IONS-SCNC: 10 MMOL/L (ref 3–16)
BUN SERPL-MCNC: 16 MG/DL (ref 7–20)
CALCIUM SERPL-MCNC: 8.9 MG/DL (ref 8.3–10.6)
CHLORIDE SERPL-SCNC: 100 MMOL/L (ref 99–110)
CO2 SERPL-SCNC: 26 MMOL/L (ref 21–32)
CREAT SERPL-MCNC: 0.9 MG/DL (ref 0.8–1.3)
DEPRECATED RDW RBC AUTO: 15.9 % (ref 12.4–15.4)
GFR SERPLBLD CREATININE-BSD FMLA CKD-EPI: >60 ML/MIN/{1.73_M2}
GLUCOSE BLD-MCNC: 354 MG/DL (ref 70–99)
GLUCOSE BLD-MCNC: 444 MG/DL (ref 70–99)
GLUCOSE BLD-MCNC: 493 MG/DL (ref 70–99)
GLUCOSE SERPL-MCNC: 338 MG/DL (ref 70–99)
HCT VFR BLD AUTO: 35.6 % (ref 40.5–52.5)
HGB BLD-MCNC: 11.6 G/DL (ref 13.5–17.5)
MAGNESIUM SERPL-MCNC: 2.1 MG/DL (ref 1.8–2.4)
MCH RBC QN AUTO: 29.4 PG (ref 26–34)
MCHC RBC AUTO-ENTMCNC: 32.5 G/DL (ref 31–36)
MCV RBC AUTO: 90.5 FL (ref 80–100)
PERFORMED ON: ABNORMAL
PHOSPHATE SERPL-MCNC: 3.3 MG/DL (ref 2.5–4.9)
PLATELET # BLD AUTO: 239 K/UL (ref 135–450)
PMV BLD AUTO: 7.6 FL (ref 5–10.5)
POTASSIUM SERPL-SCNC: 4.3 MMOL/L (ref 3.5–5.1)
RBC # BLD AUTO: 3.94 M/UL (ref 4.2–5.9)
SODIUM SERPL-SCNC: 136 MMOL/L (ref 136–145)
TROPONIN, HIGH SENSITIVITY: 88 NG/L (ref 0–22)
TROPONIN, HIGH SENSITIVITY: 93 NG/L (ref 0–22)
WBC # BLD AUTO: 8.8 K/UL (ref 4–11)

## 2024-02-13 PROCEDURE — 6370000000 HC RX 637 (ALT 250 FOR IP): Performed by: NURSE PRACTITIONER

## 2024-02-13 PROCEDURE — 84484 ASSAY OF TROPONIN QUANT: CPT

## 2024-02-13 PROCEDURE — 6360000002 HC RX W HCPCS: Performed by: STUDENT IN AN ORGANIZED HEALTH CARE EDUCATION/TRAINING PROGRAM

## 2024-02-13 PROCEDURE — 83735 ASSAY OF MAGNESIUM: CPT

## 2024-02-13 PROCEDURE — 2580000003 HC RX 258: Performed by: ANESTHESIOLOGY

## 2024-02-13 PROCEDURE — 93005 ELECTROCARDIOGRAM TRACING: CPT | Performed by: INTERNAL MEDICINE

## 2024-02-13 PROCEDURE — 99232 SBSQ HOSP IP/OBS MODERATE 35: CPT | Performed by: NURSE PRACTITIONER

## 2024-02-13 PROCEDURE — 85027 COMPLETE CBC AUTOMATED: CPT

## 2024-02-13 PROCEDURE — 84100 ASSAY OF PHOSPHORUS: CPT

## 2024-02-13 PROCEDURE — 6370000000 HC RX 637 (ALT 250 FOR IP): Performed by: INTERNAL MEDICINE

## 2024-02-13 PROCEDURE — 36415 COLL VENOUS BLD VENIPUNCTURE: CPT

## 2024-02-13 PROCEDURE — 6370000000 HC RX 637 (ALT 250 FOR IP)

## 2024-02-13 PROCEDURE — 6370000000 HC RX 637 (ALT 250 FOR IP): Performed by: STUDENT IN AN ORGANIZED HEALTH CARE EDUCATION/TRAINING PROGRAM

## 2024-02-13 PROCEDURE — 80048 BASIC METABOLIC PNL TOTAL CA: CPT

## 2024-02-13 RX ORDER — INSULIN LISPRO 100 [IU]/ML
0-16 INJECTION, SOLUTION INTRAVENOUS; SUBCUTANEOUS
Status: DISCONTINUED | OUTPATIENT
Start: 2024-02-13 | End: 2024-02-13 | Stop reason: HOSPADM

## 2024-02-13 RX ORDER — INSULIN LISPRO 100 [IU]/ML
5 INJECTION, SOLUTION INTRAVENOUS; SUBCUTANEOUS
Status: DISCONTINUED | OUTPATIENT
Start: 2024-02-13 | End: 2024-02-13 | Stop reason: HOSPADM

## 2024-02-13 RX ORDER — METOPROLOL SUCCINATE 25 MG/1
12.5 TABLET, EXTENDED RELEASE ORAL DAILY
Qty: 30 TABLET | Refills: 3 | Status: SHIPPED | OUTPATIENT
Start: 2024-02-14

## 2024-02-13 RX ORDER — TORSEMIDE 20 MG/1
40 TABLET ORAL DAILY
Status: DISCONTINUED | OUTPATIENT
Start: 2024-02-14 | End: 2024-02-13 | Stop reason: HOSPADM

## 2024-02-13 RX ORDER — INSULIN GLARGINE 100 [IU]/ML
20 INJECTION, SOLUTION SUBCUTANEOUS DAILY
Status: DISCONTINUED | OUTPATIENT
Start: 2024-02-13 | End: 2024-02-13 | Stop reason: HOSPADM

## 2024-02-13 RX ORDER — VALSARTAN 80 MG/1
80 TABLET ORAL DAILY
Qty: 30 TABLET | Refills: 3 | Status: SHIPPED | OUTPATIENT
Start: 2024-02-14

## 2024-02-13 RX ORDER — INSULIN LISPRO 100 [IU]/ML
0-4 INJECTION, SOLUTION INTRAVENOUS; SUBCUTANEOUS NIGHTLY
Status: DISCONTINUED | OUTPATIENT
Start: 2024-02-13 | End: 2024-02-13 | Stop reason: HOSPADM

## 2024-02-13 RX ORDER — TORSEMIDE 20 MG/1
40 TABLET ORAL DAILY
Qty: 60 TABLET | Refills: 2 | Status: SHIPPED
Start: 2024-02-13

## 2024-02-13 RX ORDER — SPIRONOLACTONE 25 MG/1
25 TABLET ORAL DAILY
Qty: 30 TABLET | Refills: 3 | Status: SHIPPED | OUTPATIENT
Start: 2024-02-14

## 2024-02-13 RX ADMIN — INSULIN GLARGINE 20 UNITS: 100 INJECTION, SOLUTION SUBCUTANEOUS at 09:26

## 2024-02-13 RX ADMIN — ASPIRIN 81 MG 81 MG: 81 TABLET ORAL at 09:24

## 2024-02-13 RX ADMIN — ALLOPURINOL 300 MG: 300 TABLET ORAL at 09:23

## 2024-02-13 RX ADMIN — Medication 1000 UNITS: at 09:23

## 2024-02-13 RX ADMIN — SPIRONOLACTONE 25 MG: 25 TABLET ORAL at 09:23

## 2024-02-13 RX ADMIN — INSULIN LISPRO 8 UNITS: 100 INJECTION, SOLUTION INTRAVENOUS; SUBCUTANEOUS at 09:26

## 2024-02-13 RX ADMIN — METOPROLOL SUCCINATE 12.5 MG: 25 TABLET, EXTENDED RELEASE ORAL at 09:23

## 2024-02-13 RX ADMIN — FUROSEMIDE 40 MG: 10 INJECTION, SOLUTION INTRAMUSCULAR; INTRAVENOUS at 09:22

## 2024-02-13 RX ADMIN — DORZOLAMIDE HYDROCHLORIDE AND TIMOLOL MALEATE 1 DROP: 20; 5 SOLUTION/ DROPS OPHTHALMIC at 09:27

## 2024-02-13 RX ADMIN — VALSARTAN 80 MG: 80 TABLET, FILM COATED ORAL at 09:23

## 2024-02-13 RX ADMIN — SODIUM CHLORIDE, PRESERVATIVE FREE 10 ML: 5 INJECTION INTRAVENOUS at 09:27

## 2024-02-13 RX ADMIN — INSULIN LISPRO 16 UNITS: 100 INJECTION, SOLUTION INTRAVENOUS; SUBCUTANEOUS at 13:18

## 2024-02-13 RX ADMIN — PANTOPRAZOLE SODIUM 40 MG: 40 TABLET, DELAYED RELEASE ORAL at 09:23

## 2024-02-13 RX ADMIN — APIXABAN 5 MG: 5 TABLET, FILM COATED ORAL at 09:24

## 2024-02-13 RX ADMIN — INSULIN LISPRO 5 UNITS: 100 INJECTION, SOLUTION INTRAVENOUS; SUBCUTANEOUS at 13:18

## 2024-02-13 NOTE — PLAN OF CARE
Problem: Safety - Adult  Goal: Free from fall injury  Outcome: Progressing     Problem: Chronic Conditions and Co-morbidities  Goal: Patient's chronic conditions and co-morbidity symptoms are monitored and maintained or improved  Outcome: Progressing     Problem: ABCDS Injury Assessment  Goal: Absence of physical injury  Outcome: Progressing     Problem: Pain  Goal: Verbalizes/displays adequate comfort level or baseline comfort level  Outcome: Progressing     Problem: Chronic Conditions and Co-morbidities  Goal: Patient's chronic conditions and co-morbidity symptoms are monitored and maintained or improved  DM: BS will be monitored and medication given as ordered.   Outcome: Progressing     Problem: ABCDS Injury Assessment  Goal: Absence of physical injury  Outcome: Progressing     Problem: Pain  Goal: Verbalizes/displays adequate comfort level or baseline comfort level  Outcome: Progressing     
  Problem: Safety - Adult  Goal: Free from fall injury  Outcome: Progressing     Problem: Chronic Conditions and Co-morbidities  Goal: Patient's chronic conditions and co-morbidity symptoms are monitored and maintained or improved  Outcome: Progressing     Problem: Discharge Planning  Goal: Discharge to home or other facility with appropriate resources  Outcome: Progressing     Problem: Pain  Goal: Verbalizes/displays adequate comfort level or baseline comfort level  Outcome: Progressing     Problem: Skin/Tissue Integrity  Goal: Absence of new skin breakdown  Description: 1.  Monitor for areas of redness and/or skin breakdown  2.  Assess vascular access sites hourly  3.  Every 4-6 hours minimum:  Change oxygen saturation probe site  4.  Every 4-6 hours:  If on nasal continuous positive airway pressure, respiratory therapy assess nares and determine need for appliance change or resting period.  Outcome: Progressing     Problem: Nutrition Deficit:  Goal: Optimize nutritional status  Outcome: Progressing     CHF Care Plan      Patient's EF (Ejection Fraction) is greater than 40%    Heart Failure Medications:  Diuretics:: Furosemide    (One of the following REQUIRED for EF </= 40%/SYSTOLIC FAILURE but MAY be used in EF% >40%/DIASTOLIC FAILURE)        ACE:: None        ARB:: Valsartan         ARNI:: None    (Beta Blockers)  NON- Evidenced Based Beta Blocker (for EF% >40%/DIASTOLIC FAILURE): None    Evidenced Based Beta Blocker::(REQUIRED for EF% <40%/SYSTOLIC FAILURE) Metoprolol SUCCinate- Toprol XL  ...................................................................................................................................................          Patient's weights and intake/output reviewed    Daily Weight log at bedside, patient/family participation in use of log: no    Patient's current weight today shows a difference of 15 lbs less than last documented weight.      Intake/Output Summary (Last 24 hours) at 
  Problem: Safety - Adult  Goal: Free from fall injury  Outcome: Progressing     Problem: Chronic Conditions and Co-morbidities  Goal: Patient's chronic conditions and co-morbidity symptoms are monitored and maintained or improved  Outcome: Progressing   DM: monitoring and covering with insulin as needed.      Problem: Pain  Goal: Verbalizes/displays adequate comfort level or baseline comfort level  Outcome: Progressing     Problem: Skin/Tissue Integrity  Goal: Absence of new skin breakdown  Description: 1.  Monitor for areas of redness and/or skin breakdown  2.  Assess vascular access sites hourly  3.  Every 4-6 hours minimum:  Change oxygen saturation probe site  4.  Every 4-6 hours:  If on nasal continuous positive airway pressure, respiratory therapy assess nares and determine need for appliance change or resting period.  Outcome: Progressing    Cleaning and turning as needed.      
CHF Care Plan      Patient's EF (Ejection Fraction) is greater than 40%    Heart Failure Medications:  Diuretics:: Furosemide    (One of the following REQUIRED for EF </= 40%/SYSTOLIC FAILURE but MAY be used in EF% >40%/DIASTOLIC FAILURE)        ACE:: None        ARB:: None         ARNI:: None    (Beta Blockers)  NON- Evidenced Based Beta Blocker (for EF% >40%/DIASTOLIC FAILURE): None    Evidenced Based Beta Blocker::(REQUIRED for EF% <40%/SYSTOLIC FAILURE) Metoprolol SUCCinate- Toprol XL  ...................................................................................................................................................          Patient's weights and intake/output reviewed    Daily Weight log at bedside, patient/family participation in use of log: \"yes    Patient's current weight today Patient refused daily weight.    Intake/Output Summary (Last 24 hours) at 2/13/2024 0654  Last data filed at 2/12/2024 2347  Gross per 24 hour   Intake 290 ml   Output 3025 ml   Net -2735 ml         Education Booklet Provided: yes    Comorbidities Reviewed Yes    Patient has a past medical history of Arthritis, Asthma, BiPAP (biphasic positive airway pressure) dependence, CHF (congestive heart failure) (HCC), Colon cancer (HCC), CPAP (continuous positive airway pressure) dependence, Depression, Diabetes mellitus (HCC), MRSA (methicillin resistant staph aureus) culture positive, PE (pulmonary embolism), Sleep apnea, and Vitamin D deficiency.     >>For CHF and Comorbidity documentation on Education Time and Topics, please see Education Tab      Pt resting in bed at this time on room air. Pt with complaints of shortness of breath. Pt with pitting lower extremity edema.     Patient and/or Family's stated Goal of Care this Admission: reduce shortness of breath, increase activity tolerance, better understand heart failure and disease management, be more comfortable, and reduce lower extremity edema prior to 
CHF Care Plan      Patient's EF (Ejection Fraction) is greater than 40%    Heart Failure Medications:  Diuretics:: Furosemide    (One of the following REQUIRED for EF </= 40%/SYSTOLIC FAILURE but MAY be used in EF% >40%/DIASTOLIC FAILURE)        ACE:: None        ARB:: None         ARNI:: None    (Beta Blockers)  NON- Evidenced Based Beta Blocker (for EF% >40%/DIASTOLIC FAILURE): None    Evidenced Based Beta Blocker::(REQUIRED for EF% <40%/SYSTOLIC FAILURE) Metoprolol SUCCinate- Toprol XL  ...................................................................................................................................................          Patient's weights and intake/output reviewed    Daily Weight log at bedside, patient/family participation in use of log: \"yes    Patient's current weight today shows a difference of 4 lbs less than last documented weight.      Intake/Output Summary (Last 24 hours) at 2/11/2024 0503  Last data filed at 2/11/2024 0447  Gross per 24 hour   Intake 240 ml   Output 1800 ml   Net -1560 ml       Education Booklet Provided: yes    Comorbidities Reviewed Yes    Patient has a past medical history of Arthritis, Asthma, BiPAP (biphasic positive airway pressure) dependence, CHF (congestive heart failure) (HCC), Colon cancer (HCC), CPAP (continuous positive airway pressure) dependence, Depression, Diabetes mellitus (HCC), MRSA (methicillin resistant staph aureus) culture positive, PE (pulmonary embolism), Sleep apnea, and Vitamin D deficiency.     >>For CHF and Comorbidity documentation on Education Time and Topics, please see Education Tab      Pt resting in bed at this time on room air. Pt with complaints of shortness of breath. Pt with pitting lower extremity edema.     Patient and/or Family's stated Goal of Care this Admission: reduce shortness of breath, increase activity tolerance, better understand heart failure and disease management, be more comfortable, and reduce lower extremity 
Colon cancer (HCC), CPAP (continuous positive airway pressure) dependence, Depression, Diabetes mellitus (HCC), MRSA (methicillin resistant staph aureus) culture positive, PE (pulmonary embolism), Sleep apnea, and Vitamin D deficiency.     >>For CHF and Comorbidity documentation on Education Time and Topics, please see Education Tab      Pt up in chair at this time on room air. Pt denies shortness of breath. Pt with pitting lower extremity edema.     Patient and/or Family's stated Goal of Care this Admission: reduce shortness of breath, increase activity tolerance, better understand heart failure and disease management, be more comfortable, and reduce lower extremity edema prior to discharge        :

## 2024-02-13 NOTE — CONSULTS
Comprehensive Nutrition Assessment    Type and Reason for Visit:  Initial, Positive Nutrition Screen, Consult    Nutrition Recommendations/Plan:   Continue cardiac diet  Encourage po intakes  Monitor po intakes, nutrition adequacy, weights, pertinent labs, BMs     Malnutrition Assessment:  Malnutrition Status:  At risk for malnutrition (Comment) (02/12/24 9983)      Nutrition Assessment:    Consult for CHF diet education. Positive nutrition screen for weight loss and decreased appetite. Pt [facility] to [facility] for worsening shortness of breath with swelling in the legs. Pt out of room in cath lab today. Pt currently on a cardiac diet. PO intakes % per EMR. Left handout on CHF nutrition therapy at . Handout discusses low sodium diet, weight monitoring, and fluid restriction. Noted 43 lb loss over the past 6 months per EMR. Will continue cardiac diet and monitor need for ONS. Will readdress diet education on follow up. Will monitor.    Nutrition Related Findings:    +3 BLE edema Wound Type: None       Current Nutrition Intake & Therapies:    Average Meal Intake: %  Average Supplements Intake: None Ordered  ADULT DIET; Regular; Low Fat/Low Chol/High Fiber/2 gm Na    Anthropometric Measures:  Height: 170.2 cm (5' 7\")  Ideal Body Weight (IBW): 148 lbs (67 kg)       Current Body Weight: 185.7 kg (409 lb 6.3 oz),   IBW. Weight Source: Standing Scale  Current BMI (kg/m2): 64.1                          BMI Categories: Obese Class 3 (BMI 40.0 or greater)    Estimated Daily Nutrient Needs:  Energy Requirements Based On: Kcal/kg  Weight Used for Energy Requirements: Ideal  Energy (kcal/day): 1182-1802  Weight Used for Protein Requirements: Ideal  Protein (g/day): 67-80  Method Used for Fluid Requirements: Other (Comment)  Fluid (ml/day): 2000 ml/day    Nutrition Diagnosis:   Predicted inadequate energy intake related to inadequate protein-energy intake as evidenced by weight loss    Nutrition Interventions: 
cancer (HCC) 2000    CPAP (continuous positive airway pressure) dependence     Depression     Diabetes mellitus ([facility])     MRSA (methicillin resistant staph aureus) culture positive 06/04/2017    leg culture    PE (pulmonary embolism)     Sleep apnea     Vitamin D deficiency         Past Surgical History:   Procedure Laterality Date    CARDIAC CATHETERIZATION  04/27/2017    Dr. Osorio    COLON SURGERY      CORONARY ANGIOPLASTY WITH STENT PLACEMENT  03/04/2015    mid LAD Promus Premiere [id number]    CORONARY ARTERY BYPASS GRAFT  05/02/2017    Coronary artery bypass ×2 reverse saphenous vein to LAD reverse saphenous vein to circumflex    HERNIA REPAIR      KNEE ARTHROPLASTY Left     SHOULDER ARTHROPLASTY Right        Allergies   Allergen Reactions    Adhesive Tape Other (See Comments)     Blisters    Duloxetine Headaches    Stadol [Butorphanol Tartrate] Other (See Comments)     Hypotension       Social History:  Reviewed.  reports that he quit smoking about 9 years ago. His smoking use included cigars and cigarettes. He started smoking about 14 years ago. He has a 1.3 pack-year smoking history. He has never used smokeless tobacco. He reports current alcohol use. He reports current drug use. Drug: Marijuana (Weed).     Family History:  Reviewed. family history includes Heart Disease in his brother and mother.   No premature CAD.     Review of System:  All other systems reviewed except for that noted above. Pertinent negatives and positives are:     General: negative for fever, chills   Ophthalmic ROS: negative for - eye pain or loss of vision  ENT ROS: negative for - headaches, sore throat   Respiratory: negative for - cough, sputum  Cardiovascular: Reviewed in HPI  Gastrointestinal: negative for - abdominal pain, diarrhea, N/V  Hematology: negative for - bleeding, blood clots, bruising or jaundice  Genito-Urinary:  negative for - Dysuria or incontinence  Musculoskeletal: negative for - Joint swelling, muscle 
cardiology    Thank you for allowing us to participate in the care of this patient.      HISTORY     CC: shortness of breath   HPI: The patient is a 74 y.o. male with a pmh of CAD (CABG), CHF, history of atrial fibrillation, morbid obesity and JAKE (on CPAP) presenting with bilateral chest pain and shortness of breath on exertion for 2 days.  Transfer from [facility]          Palliative Performance Scale:     [] 60%  Amb reduced; Sig dz. Can't do hobbies/housework; Intake normal or reduced, Occasional assist; LOC full/confusion   [] 50%  Mainly sit/lie; Extensive disease. Mainly assist, Intake normal or reduced; Occasional assist; LOC full/confusion   [x] 40%  Mainly in bed; Extensive disease; Mainly assist; Intake normal or reduced; Occasional assist; LOC full/confusion   [] 30%  Bed bound, Extensive disease; Total care; Intake reduced; LOC full/confusion   [] 20%  Bed bound; Extensive disease; Total care; Intake minimal; Drowsy/coma   [] 10%  Bed bound; Extensive disease; Total care; Mouth care only; Drowsy/coma   []  0%   Death       Home med list and hospital medications reviewed in chart as of 2/13/2024     EXAM     Vitals:    02/13/24 0800   BP: (!) 121/51   Pulse: 74   Resp:    Temp: 98.4 °F (36.9 °C)   SpO2: 91%                OBJECTIVE   BP (!) 121/51   Pulse 74   Temp 98.4 °F (36.9 °C)   Resp 20   Ht 1.702 m (5' 7\")   Wt (!) 185.7 kg (409 lb 6.3 oz)   SpO2 91%   BMI 64.12 kg/m²   I/O last 3 completed shifts:  In: 290 [P.O.:290]  Out: 5425 [Urine:5425]  No intake/output data recorded.      Palliative Medicine Interventions:    patient/family support  Goals of Care discussions with patient/surrogate  Spiritual Interventions: none identified          DATA:  Current labs in the epic chart reviewed as of 2/13/2024   Review of previous notes, admits, labs, radiology and testing relevant to this consult done in this chart today 2/13/2024  Review of advance directives (if any) in chart    Medical Decision

## 2024-02-13 NOTE — CARE COORDINATION
Discharge order noted.   Spoke with RN who states patient has no needs from [facility].  Patient has insurance and a PCP.

## 2024-02-13 NOTE — PROGRESS NOTES
[facility]   Heart Failure Daily Progress Note    Admit Date:  2/10/2024  HPI:  No chief complaint on file.       Wallace Nieves is being followed for shortness of breath chest pain.  Admitted to [facility] on 2/8/24 for shortness of breath and chest pain, transferred to [facility] on 2/10/2024 for further evaluation of slow atrial flutter.  Also treated for acute on chronic diastolic heart failure    Interval history:  s/p aflutter ablation     Subjective:  Mr. Nieves wants to go home  Wife at the bedside.     Objective:   /63   Pulse 72   Temp 97.3 °F (36.3 °C) (Oral)   Resp 18   Ht 1.702 m (5' 7\")   Wt (!) 185.7 kg (409 lb 6.3 oz)   SpO2 100%   BMI 64.12 kg/m²     Intake/Output Summary (Last 24 hours) at 2/12/2024 1407  Last data filed at 2/12/2024 0445  Gross per 24 hour   Intake --   Output 2400 ml   Net -2400 ml       NYHA: III    Physical Exam:  General:  Awake, alert, NAD, obese, laying flat in the bed   Skin:  Warm and dry  Neck:  JVD difficult to assess due to morbid obesity   Chest:  Clear to auscultation, no wheezes/rhonchi/rales  Cardiovascular:  irregular, reg  S1S2, no m/r/g   Abdomen:  Soft, nontender, +bowel sounds  Extremities:  ++ bilateral lower extremity edema, chronic venous stasis changes.     Medications:    sodium chloride flush  5-40 mL IntraVENous 2 times per day    metoprolol succinate  12.5 mg Oral Daily    sodium chloride flush  5-40 mL IntraVENous 2 times per day    allopurinol  300 mg Oral Daily    Vitamin D  1,000 Units Oral Daily    latanoprost  1 drop Both Eyes Nightly    dorzolamide-timolol  1 drop Both Eyes BID    valsartan  80 mg Oral Daily    apixaban  5 mg Oral BID    sodium chloride flush  5-40 mL IntraVENous 2 times per day    furosemide  40 mg IntraVENous BID    atorvastatin  40 mg Oral Nightly    aspirin  81 mg Oral Daily    insulin glargine  30 Units SubCUTAneous Nightly    insulin lispro  0-8 Units SubCUTAneous TID WC    insulin lispro  0-4 Units 
     [facility]   Heart Failure Daily Progress Note    Admit Date:  2/10/2024  HPI:  No chief complaint on file.       Wallace Nieves is being followed for shortness of breath chest pain.  Admitted to [facility] on 2/8/24 for shortness of breath and chest pain, transferred to [facility] on 2/10/2024 for further evaluation of slow atrial flutter.  Also treated for acute on chronic diastolic heart failure    Interval history:  overnight- hematuria improving.   HR ranging from 's aflutter.     Subjective:  Mr. Nieves wants to go home. Doesn't want to be in the hospital. Chest pain is improved. Breathing is stable.   Wife at the bedside- wants him to be around for a few more years.   No dizziness or lightheadedness, no palpitations.     Objective:   BP (!) 186/94   Pulse 58   Temp 97.9 °F (36.6 °C)   Resp 18   Ht 1.702 m (5' 7\")   Wt (!) 192.6 kg (424 lb 8 oz)   SpO2 93%   BMI 66.49 kg/m²     Intake/Output Summary (Last 24 hours) at 2/11/2024 0921  Last data filed at 2/11/2024 0447  Gross per 24 hour   Intake 240 ml   Output 1800 ml   Net -1560 ml       NYHA: III    Physical Exam:  General:  Awake, alert, NAD, obese, up in the chair   Skin:  Warm and dry  Neck:  JVD difficult to assess due to morbid obesity   Chest:  Clear to auscultation, no wheezes/rhonchi/rales  Cardiovascular:  irregular, reg  S1S2, no m/r/g   Abdomen:  Soft, nontender, +bowel sounds  Extremities:  ++ bilateral lower extremity edema, chronic venous stasis changes.     Medications:    allopurinol  300 mg Oral Daily    vitamin D  1,000 Units Oral Daily    latanoprost  1 drop Both Eyes Nightly    dorzolamide-timolol  1 drop Both Eyes BID    sodium chloride flush  5-40 mL IntraVENous 2 times per day    furosemide  40 mg IntraVENous BID    atorvastatin  40 mg Oral Nightly    aspirin  81 mg Oral Daily    insulin glargine  30 Units SubCUTAneous Nightly    insulin lispro  0-8 Units SubCUTAneous TID WC    insulin lispro  0-4 Units SubCUTAneous 
  Hospital Medicine Progress Note      Date of Admission: 2/10/2024  Hospital Day: 3    Chief Admission Complaint:  heart failure    Subjective:  denies chest pain, dyspnea, dizziness, s/p EP ablation. Wife at bedside.    Presenting Admission History: \"[facility] because of concerns for worsening shortness of breath with swelling in the legs. It got worse to the point patient wanted to come to the hospital to get checked. She will patient has extensive cardiac history raising concerns for heart failure. In the ED patient with classic symptoms of heart failure CT chest did not show any pulmonary embolism or acute pulmonary abnormalities. Chest x-ray shows stable cardiomegaly. BNP was elevated at 1522. With a BMI of 67.19 it is an underestimation. To be admitted to the hospital for CHF exacerbation. While the patient was evaluated at [facility] patient was found to have atrial flutter with variable heart block and slow ventricular response. Has been off all AV kim blocking agents for more than 48 hours. Discussed with the patient by cardiology regarding pacemaker at [place] patient will be transferred here. On top of that patient was found to have gross hematuria urology was consulted Soliz draining pink urine. Continue to hold Eliquis\"    Assessment/Plan:      Current Principal Problem:  Heart failure (HCC)    Acute on chronic diastolic heart failure.  Hypertension and ischemia likely contributing.  Limited echo 2/9/24: EF 55%; no obvious regional WMAs; elevated filling pressure.  Daily STANDING weights. On admission 192.6kg.  Strict I&O.  Continue IV lasix.  No beta blocker at this time 2/2 bradycardia.  BNP 1,522 on arrival.  CHF orderset is in place. Current wt 185.7    Gross hematuria.  Hgb stable at 11.3.  Urology consulted at previous hospital with recommendation to resume eliquis 2/11 if urine was still clear.    Atrial fibrillation, paroxsymal/a-flutter.  Rate is controlled with episodes of 
Patient converted out of NS to  A-fib. EKG performed and on-call Cardiologist notified.   
Patient declined to have his morning weight checked.   
Pt brought to PACU. Report obtained from OR RN and anesthesia. Pt placed on monitor and O2 at 4lpm via nasal cannula. Vitals taken at this time are below:  Vitals:    02/12/24 1305   BP: 129/62   Pulse: 83   Resp: 16   Temp: 97.3 °F (36.3 °C)   SpO2: 98%     Pt rousable to voice/stimulation. Pt following commands appropriately. Pt denies complaints of pain at this time. Pt's bilateral groin sites are clean, dry, and intact with gauze and tegaderm dressings in place. Pt's pedal pulses are palpable +2 with capillary refill <3sec. Pt's BLE with +3 pitting edema. Pt's chairez catheter remains in place; currently draining pink tinged/brown colored urine.     
Pt d/c'd home via private vehicle.  Removed peripheral IV and stopped bleeding.  Catheter intact. Pt tolerated well. No redness noted at site.  Notified CMU and removed tele box. Reviewed d/c instructions, home meds, and  f/u information utilizing teach-back method.  Scripts for Aldactone, valsartan, and toprol given to patient. Patient verbalized understanding.      
Specialty bed ordered for patient.   
Urology Progress Note   Wallace Nieves   :  1950   MRN:  [id number]     Length of Stay:  1      Assessment:  73yo M with gross hematuria after cath insertion for urinary management in setting of lasix use      Plan:  1) Gross hematuria:  -Minimal.  Urine thin and mostly clear in tubing, pink to red tinge when concentrated.  No clots or occlusion  -Manually irrigate cath PRN.    -Voiding trial when medically appropriate         Medications:     Scheduled Meds:   allopurinol  300 mg Oral Daily    Vitamin D  1,000 Units Oral Daily    latanoprost  1 drop Both Eyes Nightly    dorzolamide-timolol  1 drop Both Eyes BID    valsartan  80 mg Oral Daily    sodium chloride flush  5-40 mL IntraVENous 2 times per day    furosemide  40 mg IntraVENous BID    atorvastatin  40 mg Oral Nightly    aspirin  81 mg Oral Daily    insulin glargine  30 Units SubCUTAneous Nightly    insulin lispro  0-8 Units SubCUTAneous TID WC    insulin lispro  0-4 Units SubCUTAneous Nightly    pantoprazole  40 mg Oral QAM AC        Continuous Infusions:   sodium chloride      dextrose          PRN Meds:sodium chloride flush, sodium chloride, ondansetron **OR** ondansetron, polyethylene glycol, acetaminophen **OR** acetaminophen, glucose, dextrose bolus **OR** dextrose bolus, glucagon (rDNA), dextrose        24-hour Events:  No acute     Subjective: no complaints         Objective:     Vital signs in last 24 hours:  BP (!) 186/94   Pulse 58   Temp 97.9 °F (36.6 °C)   Resp 18   Ht 1.702 m (5' 7\")   Wt (!) 192.6 kg (424 lb 8 oz)   SpO2 93%   BMI 66.49 kg/m²   Temp  Av.8 °F (36.6 °C)  Min: 97.5 °F (36.4 °C)  Max: 98.1 °F (36.7 °C)    Intake/Output Summary (Last 24 hours) at 2024 0921  Last data filed at 2024 0447  Gross per 24 hour   Intake 240 ml   Output 1800 ml   Net -1560 ml     EXAM:    Gen: No acute distress; resting comfortably in hospital bed  : chairez in place and draining well; urine thin in tubing and minimally blood 
Urology Progress Note   Wallace Nieves   :  1950   MRN:  [id number]     Length of Stay:  3      Assessment:  75yo M with gross hematuria after cath insertion for urinary management in setting of lasix use      Plan:  1) Gross hematuria:  -Resolved.  Urine thin and clear today  -Voiding trial when medically appropriate   - No further urologic input.  Will sign off, please call with any questions         Medications:     Scheduled Meds:   insulin glargine  20 Units SubCUTAneous Daily    sodium chloride flush  5-40 mL IntraVENous 2 times per day    metoprolol succinate  12.5 mg Oral Daily    sodium chloride flush  5-40 mL IntraVENous 2 times per day    spironolactone  25 mg Oral Daily    allopurinol  300 mg Oral Daily    Vitamin D  1,000 Units Oral Daily    latanoprost  1 drop Both Eyes Nightly    dorzolamide-timolol  1 drop Both Eyes BID    valsartan  80 mg Oral Daily    apixaban  5 mg Oral BID    sodium chloride flush  5-40 mL IntraVENous 2 times per day    furosemide  40 mg IntraVENous BID    atorvastatin  40 mg Oral Nightly    aspirin  81 mg Oral Daily    insulin glargine  30 Units SubCUTAneous Nightly    insulin lispro  0-8 Units SubCUTAneous TID WC    insulin lispro  0-4 Units SubCUTAneous Nightly    pantoprazole  40 mg Oral QAM AC        Continuous Infusions:   sodium chloride      sodium chloride      sodium chloride      dextrose          PRN Meds:sodium chloride flush, sodium chloride, sodium chloride flush, sodium chloride, HYDROmorphone, oxyCODONE-acetaminophen, sodium chloride flush, sodium chloride, ondansetron **OR** ondansetron, polyethylene glycol, acetaminophen **OR** acetaminophen, glucose, dextrose bolus **OR** dextrose bolus, glucagon (rDNA), dextrose        24-hour Events:  No acute     Subjective: no complaints         Objective:     Vital signs in last 24 hours:  BP (!) 121/51   Pulse 74   Temp 98.4 °F (36.9 °C)   Resp 20   Ht 1.702 m (5' 7\")   Wt (!) 187.1 kg (412 lb 6.4 oz)   
Urology Progress Note   Wallace Nieves   :  1950   MRN:  [id number]     Length of Stay:  2      Assessment:  73yo M with gross hematuria after cath insertion for urinary management in setting of lasix use      Plan:  1) Gross hematuria:  -Minimal.  Urine thin and mostly clear in tubing, pink to red tinge when concentrated.  No clots or occlusion  -Manually irrigate cath PRN.    -Voiding trial when medically appropriate         Medications:     Scheduled Meds:   sodium chloride flush  5-40 mL IntraVENous 2 times per day    metoprolol succinate  12.5 mg Oral Daily    sodium chloride flush  5-40 mL IntraVENous 2 times per day    [START ON 2024] spironolactone  25 mg Oral Daily    allopurinol  300 mg Oral Daily    Vitamin D  1,000 Units Oral Daily    latanoprost  1 drop Both Eyes Nightly    dorzolamide-timolol  1 drop Both Eyes BID    valsartan  80 mg Oral Daily    apixaban  5 mg Oral BID    sodium chloride flush  5-40 mL IntraVENous 2 times per day    furosemide  40 mg IntraVENous BID    atorvastatin  40 mg Oral Nightly    aspirin  81 mg Oral Daily    insulin glargine  30 Units SubCUTAneous Nightly    insulin lispro  0-8 Units SubCUTAneous TID WC    insulin lispro  0-4 Units SubCUTAneous Nightly    pantoprazole  40 mg Oral QAM AC        Continuous Infusions:   sodium chloride      sodium chloride      sodium chloride      dextrose          PRN Meds:sodium chloride flush, sodium chloride, sodium chloride flush, sodium chloride, HYDROmorphone, oxyCODONE-acetaminophen, sodium chloride flush, sodium chloride, ondansetron **OR** ondansetron, polyethylene glycol, acetaminophen **OR** acetaminophen, glucose, dextrose bolus **OR** dextrose bolus, glucagon (rDNA), dextrose        24-hour Events:  No acute     Subjective: no complaints         Objective:     Vital signs in last 24 hours:  /72   Pulse 72   Temp 97.3 °F (36.3 °C) (Oral)   Resp 18   Ht 1.702 m (5' 7\")   Wt (!) 185.7 kg (409 lb 6.3 oz)   
valve appears sclerotic but opens adequately.   8- 50-55%, LVH, REYNOLD, AV sclerosis          02/13/24 0930 187.1 kg (412 lb 6.4 oz) Standing scale      02/13/24 0352 -- Standing scale     02/12/24 0445 185.7 kg (409 lb 6.3 oz) Standing scale     02/11/24 0447 192.6 kg (424 lb 8 oz) Standing scale     02/10/24 1815 194.6 kg (429 lb) Standing scale;Actual           Principal Problem:    Heart failure (HCC)  Active Problems:    ASHD (arteriosclerotic heart disease)  Resolved Problems:    * No resolved hospital problems. *    Assessment:  Acute on chronic HFpEF exacerbation  Atrial flutter with intermittent slow ventricular response s/p aflutter ablation 2/12/2024  PAF   CAD s/p CABG x2 2017  HTN- uncontrolled   History of PE  Hematuria  Morbid obese  Diabetes    Plan:  Continue valsartan 80mg daily   Continue aspirin and statin  Spironolactone (aldactone) 25 mg daily   D/c IV lasix and change to torsemide 40mg daily home dose  Would hold home potassium at discharge since starting the Spironolactone (aldactone)  Okay to d/c mihaela from CHF perspective     Repeat BMP in 5 days after discharge    Okay for discharge from CHF perspective  when okay with others     MIROSLAVA Raines - CNP,  2/13/2024, 10:43 AM  
associated risk factors:    ----------------------------------------------------------------------  C. Data (any 2)  [] Discussed management of the case with:    [] Discussed the discharge plan in detail with case mgt including timing/barriers to discharge, need for support services and placement decision   [x] Imaging personally reviewed and interpreted, includes:   [x] Telemetry monitoring w/ a-fib 80s   [x] Data Review (any 3)  [] Collateral history obtained from:    [x] All available Consultant notes from yesterday/today were reviewed  [x] All current labs were reviewed and interpreted for clinical significance   [x] Appropriate follow-up labs were ordered    Medications:  Personally reviewed in detail in conjunction w/ labs as documented for evidence of drug toxicity.     Infusion Medications    sodium chloride      dextrose       Scheduled Medications    allopurinol  300 mg Oral Daily    Vitamin D  1,000 Units Oral Daily    latanoprost  1 drop Both Eyes Nightly    dorzolamide-timolol  1 drop Both Eyes BID    valsartan  80 mg Oral Daily    apixaban  5 mg Oral BID    sodium chloride flush  5-40 mL IntraVENous 2 times per day    furosemide  40 mg IntraVENous BID    atorvastatin  40 mg Oral Nightly    aspirin  81 mg Oral Daily    insulin glargine  30 Units SubCUTAneous Nightly    insulin lispro  0-8 Units SubCUTAneous TID WC    insulin lispro  0-4 Units SubCUTAneous Nightly    pantoprazole  40 mg Oral QAM AC     PRN Meds: sodium chloride flush, sodium chloride, ondansetron **OR** ondansetron, polyethylene glycol, acetaminophen **OR** acetaminophen, glucose, dextrose bolus **OR** dextrose bolus, glucagon (rDNA), dextrose     Labs:  Personally reviewed and interpreted for clinical significance.     Recent Labs     02/08/24  1627 02/10/24  0501 02/11/24  0508   WBC 8.4 10.4 7.3   HGB 10.3* 10.6* 11.7*   HCT 31.6* 33.0* 35.6*    218 255     Recent Labs     02/09/24  0447 02/10/24  0501 02/11/24  0508    
lower extremity edema   Skin: warm and dry  Bilateral access points stable   Neurological:  Alert and oriented  Moves all extremities well  No abnormalities of mood, affect, memory, mentation, or behavior are noted    Data      Echo 2/2024:   Summary   This is a limited study for systolic and diastolic CHF.   Definity contrast administered.   Left ventricular systolic function is normal with ejection fraction   estimated at 55 %.   Endocardium not entirely well visualized but no obvious segmental wall   motion abnormalities.   There is mild concentric left ventricular hypertrophy.   Elevated left ventricular diastolic filling pressure.   Mild mitral annular calcification.   Aortic valve appears sclerotic but opens adequately.   8- 50-55%, LVH, REYNOLD, AV sclerosis        All labs and testing reviewed.  Lab Review     Renal Profile:   Lab Results   Component Value Date/Time    CREATININE 0.9 02/13/2024 04:49 AM    BUN 16 02/13/2024 04:49 AM     02/13/2024 04:49 AM    K 4.3 02/13/2024 04:49 AM    K 3.9 02/08/2024 04:27 PM     02/13/2024 04:49 AM    CO2 26 02/13/2024 04:49 AM     CBC:    Lab Results   Component Value Date/Time    WBC 8.8 02/13/2024 04:49 AM    RBC 3.94 02/13/2024 04:49 AM    HGB 11.6 02/13/2024 04:49 AM    HCT 35.6 02/13/2024 04:49 AM    MCV 90.5 02/13/2024 04:49 AM    RDW 15.9 02/13/2024 04:49 AM     02/13/2024 04:49 AM     BNP:    Lab Results   Component Value Date/Time    BNP 15.6 02/09/2012 08:35 PM     Fasting Lipid Panel:    Lab Results   Component Value Date/Time    CHOL 131 02/10/2024 07:35 PM    HDL 43 02/10/2024 07:35 PM    HDL 33 04/06/2011 04:05 AM    TRIG 107 02/10/2024 07:35 PM     Cardiac Enzymes:  CK/MbTroponin  Lab Results   Component Value Date/Time    CKTOTAL 195 08/14/2023 03:25 PM    TROPONINI <0.01 10/18/2018 12:43 AM     PT/ INR   Lab Results   Component Value Date/Time    INR 2.04 07/15/2017 02:52 PM    INR 1.13 05/16/2017 07:29 AM    INR 1.11 12/01/2015

## 2024-02-14 LAB
EKG ATRIAL RATE: 40 BPM
EKG ATRIAL RATE: 43 BPM
EKG DIAGNOSIS: NORMAL
EKG DIAGNOSIS: NORMAL
EKG Q-T INTERVAL: 462 MS
EKG Q-T INTERVAL: 472 MS
EKG QRS DURATION: 142 MS
EKG QRS DURATION: 144 MS
EKG QTC CALCULATION (BAZETT): 501 MS
EKG QTC CALCULATION (BAZETT): 515 MS
EKG R AXIS: -59 DEGREES
EKG R AXIS: -61 DEGREES
EKG T AXIS: 135 DEGREES
EKG T AXIS: 140 DEGREES
EKG VENTRICULAR RATE: 68 BPM
EKG VENTRICULAR RATE: 75 BPM

## 2024-02-15 LAB
GLUCOSE BLD-MCNC: 245 MG/DL (ref 70–99)
PERFORMED ON: ABNORMAL

## 2024-02-16 ENCOUNTER — FOLLOWUP TELEPHONE ENCOUNTER (OUTPATIENT)
Dept: TELEMETRY | Age: 74
End: 2024-02-16

## 2024-02-16 NOTE — TELEPHONE ENCOUNTER
1st Attempt; No Answer- Left HIPAA compliant voicemail with Non-Urgent Heart Failure Resource Line number for call back.     Emma Reyes RN

## 2024-02-16 NOTE — TELEPHONE ENCOUNTER
2nd Attempt; No Answer- Left HIPAA compliant voicemail with Non-Urgent Heart Failure Resource Line number for call back.     Emma Reyes RN

## 2024-02-16 NOTE — TELEPHONE ENCOUNTER
Care Transitions Initial Follow Up Call    Call within 2 business days of discharge: Yes     Patient: Wallace Nieves Patient : 1950 MRN: [id number]    [unfilled]    RARS: Readmission Risk Score: 17.6       Spoke with: pt wife    Discharge department/facility: home    Non-face-to-face services provided:  Scheduled appointment with PCP-2/15/24    Spoke to patient wife for hospital follow up.  Per wife pt has been monitoring diet and fluid intake.   States that he has been doing well with this.   Verified diuretic dosing.  Denies any additional needs at this time.     Follow Up  Future Appointments   Date Time Provider Department Center   2024 10:45 AM JEROMY Lang Jr., MD [facility]   2024 11:00 AM Onel López MD P [facility]       Emma Reyes RN

## 2024-05-16 NOTE — PROGRESS NOTES
[facility] [place]   Cardiac Evaulation    Referring Provider:  Carlos A Malagon MD     No chief complaint on file.     Subjective: Wallace Nieves presents today for cardiology follow up; no complaints    History of present Illness:   Mr. Nieves is a 74 y.o. male with PMH morbid obesity, CAD s/p LAD DELFINO 3/15 and 2V CABG 5/17, DM, atrial flutter dx 8/23 on eliquis (off amiodarone now), s/p aflutter ablation 2/12/24, JAKE on CPAP, hx PE, colon cancer s/p surgery, and OA. Admitted to [facility] 4/25/2017 with acute SOB and ischemic EKG changes. [facility] 4/27/17 MVCAD. Underwent s/p 2V CABG 5/2/17 by Dr. Ramos.  He developed A fib/Aflutter post-op but resolved until 2023.    Presented ER 8/14/23 for right ankle pain and incidentally found AFlutter on EKG. EKG 8/14/23 Atrial flutter with 4:1 A-V conduction. Echo 8/15/23 EF=50-55% (EF=55-60% in 5/17); severe cLVH.   EKG 9/11/23 aflutter 58; RBBB; LV; left axis (no change 8/15/23)   Admitted 2/24 right after ablation with aflutter and diastolic CHF. Limited ECHO 2/9/24 EF=55%, mild LVH, elevated LV filling pressure; MAC. DELPHINE 2/21/24 EF=55%, mild-mod TR, mild MR.   Today, he is doing well. Presents in his motorized scooter, wife is with him. Wife states EP Dr. Lang told him to take metoprolol at night. He reports cardiac monitor placed Thursday and developed severe rash so he removed it (note adhesive tape allergy also).  He states Dr. Lang mentioned possible loop procedure in future. EKG 5/34/24 possible NSR with prolonged AV delay with progression; RBBB; left axis; LV. Patient denies current edema, chest pain, shortness of breath, palpitations, dizziness or syncope. Patient is taking all cardiac medications as prescribed and tolerates them well.     Weight today is 408# (down 32# from 9/2023)    Patient is vaccinated against Covid. Moderna    Past Medical History:   has a past medical history of Arthritis, Asthma, BiPAP (biphasic positive airway pressure)

## 2024-05-21 ENCOUNTER — HOSPITAL ENCOUNTER (OUTPATIENT)
Age: 74
Discharge: HOME | End: 2024-05-21
Payer: MEDICARE

## 2024-05-21 DIAGNOSIS — I50.33 ACUTE ON CHRONIC DIASTOLIC HEART FAILURE (HCC): ICD-10-CM

## 2024-05-21 LAB
ANION GAP SERPL CALCULATED.3IONS-SCNC: 10 MMOL/L (ref 3–16)
BUN SERPL-MCNC: 17 MG/DL (ref 7–20)
CALCIUM SERPL-MCNC: 9.4 MG/DL (ref 8.3–10.6)
CHLORIDE SERPL-SCNC: 102 MMOL/L (ref 99–110)
CO2 SERPL-SCNC: 28 MMOL/L (ref 21–32)
CREAT SERPL-MCNC: 1 MG/DL (ref 0.8–1.3)
GFR SERPLBLD CREATININE-BSD FMLA CKD-EPI: 79 ML/MIN/{1.73_M2}
GLUCOSE SERPL-MCNC: 139 MG/DL (ref 70–99)
POTASSIUM SERPL-SCNC: 3.9 MMOL/L (ref 3.5–5.1)
SODIUM SERPL-SCNC: 140 MMOL/L (ref 136–145)

## 2024-05-21 PROCEDURE — 36415 COLL VENOUS BLD VENIPUNCTURE: CPT

## 2024-05-21 PROCEDURE — 80048 BASIC METABOLIC PNL TOTAL CA: CPT

## 2024-05-22 ENCOUNTER — TELEPHONE (OUTPATIENT)
Dept: CARDIOLOGY CLINIC | Age: 74
End: 2024-05-22

## 2024-05-22 NOTE — TELEPHONE ENCOUNTER
----- Message from Diane M Enzweiler, APRN - CNP sent at 5/21/2024  5:06 PM EDT -----  Blood sugar slightly elevated, other wise okay. Continue same medications. Please call.

## 2024-05-23 ENCOUNTER — TELEPHONE (OUTPATIENT)
Dept: CARDIOLOGY CLINIC | Age: 74
End: 2024-05-23

## 2024-05-23 ENCOUNTER — OFFICE VISIT (OUTPATIENT)
Dept: CARDIOLOGY CLINIC | Age: 74
End: 2024-05-23
Payer: MEDICARE

## 2024-05-23 ENCOUNTER — ANCILLARY PROCEDURE (OUTPATIENT)
Dept: CARDIOLOGY CLINIC | Age: 74
End: 2024-05-23
Payer: MEDICARE

## 2024-05-23 VITALS
HEART RATE: 56 BPM | SYSTOLIC BLOOD PRESSURE: 98 MMHG | WEIGHT: 315 LBS | BODY MASS INDEX: 65.31 KG/M2 | DIASTOLIC BLOOD PRESSURE: 62 MMHG

## 2024-05-23 DIAGNOSIS — I48.0 PAROXYSMAL ATRIAL FIBRILLATION (HCC): ICD-10-CM

## 2024-05-23 DIAGNOSIS — Z09 HOSPITAL DISCHARGE FOLLOW-UP: ICD-10-CM

## 2024-05-23 DIAGNOSIS — I48.0 PAF (PAROXYSMAL ATRIAL FIBRILLATION) (HCC): ICD-10-CM

## 2024-05-23 DIAGNOSIS — I48.0 PAROXYSMAL ATRIAL FIBRILLATION (HCC): Primary | ICD-10-CM

## 2024-05-23 PROCEDURE — 3017F COLORECTAL CA SCREEN DOC REV: CPT | Performed by: INTERNAL MEDICINE

## 2024-05-23 PROCEDURE — 99214 OFFICE O/P EST MOD 30 MIN: CPT | Performed by: INTERNAL MEDICINE

## 2024-05-23 PROCEDURE — 1123F ACP DISCUSS/DSCN MKR DOCD: CPT | Performed by: INTERNAL MEDICINE

## 2024-05-23 PROCEDURE — 93270 REMOTE 30 DAY ECG REV/REPORT: CPT | Performed by: INTERNAL MEDICINE

## 2024-05-23 PROCEDURE — 1111F DSCHRG MED/CURRENT MED MERGE: CPT | Performed by: INTERNAL MEDICINE

## 2024-05-23 PROCEDURE — 1036F TOBACCO NON-USER: CPT | Performed by: INTERNAL MEDICINE

## 2024-05-23 PROCEDURE — 3074F SYST BP LT 130 MM HG: CPT | Performed by: INTERNAL MEDICINE

## 2024-05-23 PROCEDURE — 93000 ELECTROCARDIOGRAM COMPLETE: CPT | Performed by: INTERNAL MEDICINE

## 2024-05-23 PROCEDURE — G8417 CALC BMI ABV UP PARAM F/U: HCPCS | Performed by: INTERNAL MEDICINE

## 2024-05-23 PROCEDURE — G8427 DOCREV CUR MEDS BY ELIG CLIN: HCPCS | Performed by: INTERNAL MEDICINE

## 2024-05-23 PROCEDURE — 3078F DIAST BP <80 MM HG: CPT | Performed by: INTERNAL MEDICINE

## 2024-05-23 NOTE — PROGRESS NOTES
[facility]   Electrophysiology Consult Note            Date: 5/23/24  Patient Name: Wallace Nieves  YOB: 1950    Primary Care Physician: Carlos A Malagon MD    CHIEF COMPLAINT:   Chief Complaint   Patient presents with    Follow-Up from Hospital     S/p ablation     HISTORY OF PRESENT ILLNESS: Wallace Nieves is a 74 y.o. male  who was admitted on 2/10/2024 with chest pain and shortness of breath. EP consulted for AFL with slow ventricular response. Has also been treated for hematuria and CHF. On 2/12/2024, he had an EPS and RFCA of AFL. He had a normal HV interval on EPS and so a pacemaker was not indicated.     Today, 5/23/2024, EKG demonstrates possible Wenckebach 53 bpm. He reports that he is doing ok. He has SOB at times. He is taking his medications as prescribed. Denies recent issues with bleeding or bruising. Patient denies current edema, chest pain, palpitations, dizziness or syncope.     Past Medical History:   has a past medical history of Arthritis, Asthma, BiPAP (biphasic positive airway pressure) dependence, CHF (congestive heart failure) (HCC), Colon cancer (HCC), CPAP (continuous positive airway pressure) dependence, Depression, Diabetes mellitus (HCC), MRSA (methicillin resistant staph aureus) culture positive, PE (pulmonary embolism), Sleep apnea, and Vitamin D deficiency.    Past Surgical History:   has a past surgical history that includes Knee Arthroplasty (Left); Colon surgery; hernia repair; Coronary angioplasty with stent (03/04/2015); Total shoulder arthroplasty (Right); Cardiac catheterization (04/27/2017); and Coronary artery bypass graft (05/02/2017).     Allergies:  Adhesive tape, Duloxetine, and Stadol [butorphanol tartrate]    Social History:   reports that he quit smoking about 10 years ago. His smoking use included cigars and cigarettes. He started smoking about 15 years ago. He has a 1.3 pack-year smoking history. He has never used smokeless tobacco.

## 2024-05-23 NOTE — PATIENT INSTRUCTIONS
RECOMMENDATIONS:  Start taking metoprolol 12.5 mg at night.   Continue Eliquis.  Consider implantable loop monitor for long term monitoring.   Let us know if you would like to proceed.  3 week event monitor.   Follow up in 6 months with DOROTA NP.

## 2024-05-23 NOTE — TELEPHONE ENCOUNTER
Monitor placed by [facility]  Monitor company [employer]  Length of monitor 3 weeks  Monitor ordered by [employer]  Bluetooth ID [id number]  Activation successful prior to pt leaving office? Yes

## 2024-05-24 ENCOUNTER — TELEPHONE (OUTPATIENT)
Dept: CARDIOLOGY CLINIC | Age: 74
End: 2024-05-24

## 2024-05-24 NOTE — TELEPHONE ENCOUNTER
[employer] Event Report detecting AF, please see media tab. Patient with hx of AF per last OV note 05/23/24. On AC Eliquis therapy. Will send to EP for review. SINDI ATKINSON.

## 2024-05-28 ENCOUNTER — TELEPHONE (OUTPATIENT)
Dept: CARDIOLOGY CLINIC | Age: 74
End: 2024-05-28

## 2024-05-28 ENCOUNTER — OFFICE VISIT (OUTPATIENT)
Dept: CARDIOLOGY CLINIC | Age: 74
End: 2024-05-28
Payer: MEDICARE

## 2024-05-28 VITALS
WEIGHT: 315 LBS | SYSTOLIC BLOOD PRESSURE: 94 MMHG | BODY MASS INDEX: 49.44 KG/M2 | OXYGEN SATURATION: 94 % | HEART RATE: 62 BPM | HEIGHT: 67 IN | DIASTOLIC BLOOD PRESSURE: 50 MMHG

## 2024-05-28 DIAGNOSIS — I50.31 ACUTE DIASTOLIC CONGESTIVE HEART FAILURE (HCC): Primary | ICD-10-CM

## 2024-05-28 DIAGNOSIS — E78.2 MIXED HYPERLIPIDEMIA: Chronic | ICD-10-CM

## 2024-05-28 DIAGNOSIS — I25.110 CORONARY ARTERY DISEASE INVOLVING NATIVE CORONARY ARTERY OF NATIVE HEART WITH UNSTABLE ANGINA PECTORIS (HCC): Chronic | ICD-10-CM

## 2024-05-28 DIAGNOSIS — I50.32 CHRONIC DIASTOLIC CHF (CONGESTIVE HEART FAILURE) (HCC): Chronic | ICD-10-CM

## 2024-05-28 DIAGNOSIS — I48.0 PAROXYSMAL ATRIAL FIBRILLATION (HCC): Primary | ICD-10-CM

## 2024-05-28 DIAGNOSIS — G47.33 OSA (OBSTRUCTIVE SLEEP APNEA): ICD-10-CM

## 2024-05-28 DIAGNOSIS — I10 ESSENTIAL HYPERTENSION: ICD-10-CM

## 2024-05-28 PROCEDURE — G8427 DOCREV CUR MEDS BY ELIG CLIN: HCPCS | Performed by: INTERNAL MEDICINE

## 2024-05-28 PROCEDURE — 3074F SYST BP LT 130 MM HG: CPT | Performed by: INTERNAL MEDICINE

## 2024-05-28 PROCEDURE — 99214 OFFICE O/P EST MOD 30 MIN: CPT | Performed by: INTERNAL MEDICINE

## 2024-05-28 PROCEDURE — 3078F DIAST BP <80 MM HG: CPT | Performed by: INTERNAL MEDICINE

## 2024-05-28 PROCEDURE — G8417 CALC BMI ABV UP PARAM F/U: HCPCS | Performed by: INTERNAL MEDICINE

## 2024-05-28 PROCEDURE — 1036F TOBACCO NON-USER: CPT | Performed by: INTERNAL MEDICINE

## 2024-05-28 PROCEDURE — 1123F ACP DISCUSS/DSCN MKR DOCD: CPT | Performed by: INTERNAL MEDICINE

## 2024-05-28 PROCEDURE — 3017F COLORECTAL CA SCREEN DOC REV: CPT | Performed by: INTERNAL MEDICINE

## 2024-05-28 RX ORDER — METOPROLOL SUCCINATE 25 MG/1
12.5 TABLET, EXTENDED RELEASE ORAL NIGHTLY
Qty: 30 TABLET | Refills: 3
Start: 2024-05-28

## 2024-05-28 NOTE — TELEPHONE ENCOUNTER
MIHIR DELONG 5/23/2024  RECOMMENDATIONS:  Start taking metoprolol 12.5 mg at night.   Continue Eliquis.  Consider implantable loop monitor for long term monitoring.   Let us know if you would like to proceed.  3 week event monitor.   Follow up in 6 months with EP NP.       SINDI ATKINSON-SARA patient has upcoming appointment with you 12/5/2024...are you able to review and advise since SINDI is OOT?    Typically, insurance requires a monitor prior to approval for ILR.

## 2024-05-28 NOTE — TELEPHONE ENCOUNTER
Patient saw SMANDRÉS today and reports he removed his monitor due to the patch causing a rash. His wife reports that Dr. Lang talked about doing a loop procedure, JOSLYN would like Dr. Lang be aware that they removed the monitor and would like the next steps moving forward for them.     Please advise.

## 2024-05-28 NOTE — PATIENT INSTRUCTIONS
Labs reviewed in epic and discussed with patient.  Medications reviewed in office. Medications refilled as warranted

## 2024-05-30 NOTE — TELEPHONE ENCOUNTER
Aixa Howell, APRN - CNP  [facility] EpYesterday (7:30 AM)     if patient is interested in proceeding with loop recorder then we can set that up to be done when Dr. Lang returns. Thanks!       LVM for patient. Insurance will require 15 day event monitor before approving ILR. Not sure how long he wore monitor.

## 2024-05-31 NOTE — TELEPHONE ENCOUNTER
5/31- pt is now scheduled 6/3/2024 at 4pm for 15 day land monitor. Do I need to call patient to inform that it is now scheduled?

## 2024-05-31 NOTE — TELEPHONE ENCOUNTER
Patient was informed of appointment time per RNJC-she just needed assistance with the scheduling part. Thanks.

## 2024-05-31 NOTE — TELEPHONE ENCOUNTER
I spoke with the patients wife and we will attempt a [employer] monitor. Order placed. Patient agreeable to come in at 4 pm on Monday 06/03/2024. Please schedule the patient as it will not let me.

## 2024-05-31 NOTE — TELEPHONE ENCOUNTER
Pts wife Soo returned call. Per HIPAA message was given. She said that pt wore the monitor from 5/23-5/26 when he broke out in a rash. They would like to know what his options are. Please advise.

## 2024-06-03 ENCOUNTER — ANCILLARY PROCEDURE (OUTPATIENT)
Dept: CARDIOLOGY CLINIC | Age: 74
End: 2024-06-03
Payer: MEDICARE

## 2024-06-03 ENCOUNTER — TELEPHONE (OUTPATIENT)
Dept: CARDIOLOGY CLINIC | Age: 74
End: 2024-06-03

## 2024-06-03 NOTE — TELEPHONE ENCOUNTER
Monitor placed by JULISSA Trujillo  Monitor company [employer]  Length of monitor 21 days  Monitor ordered by JOSE E  Serial number [id number]  Activation successful prior to pt leaving office? Yes

## 2024-06-05 NOTE — TELEPHONE ENCOUNTER
Spoke with pt's wife, stated monitor won't stick. When pt sweats patch monitor will off. Pt has already tried [employer] and had reaction to [employer] monitor that's why pt is wearing land.

## 2024-06-05 NOTE — TELEPHONE ENCOUNTER
Pts wife stated that pt keeps sweating and the patches are not sticking. She has placed tape and the patches keep falling off. They are not sure if they need more patches or try something else. Please advise.

## 2024-06-06 NOTE — TELEPHONE ENCOUNTER
I'd advise to keep trying, we just need to document we tried for 2 weeks regardless of data.  Thanks.

## 2024-06-07 ENCOUNTER — TELEPHONE (OUTPATIENT)
Dept: CARDIOLOGY CLINIC | Age: 74
End: 2024-06-07

## 2024-06-07 PROCEDURE — 93272 ECG/REVIEW INTERPRET ONLY: CPT | Performed by: INTERNAL MEDICINE

## 2024-06-07 NOTE — TELEPHONE ENCOUNTER
Result Text  Extremely poor data with high noise.  Rate controlled atrial fibrillation with nocturnal bradycardia.  Consider decreasing kim agents if applicable.    Patient is currently taking Metoprolol 12.5 mg daily. Prior to calling patient with results, would you like to further decrease? Of note, a 21 day monitor was placed on 06/03/2024 if you would like to await those results before making a decision?

## 2024-06-14 PROCEDURE — 93272 ECG/REVIEW INTERPRET ONLY: CPT | Performed by: INTERNAL MEDICINE

## 2024-06-17 ENCOUNTER — TELEPHONE (OUTPATIENT)
Dept: CARDIOLOGY CLINIC | Age: 74
End: 2024-06-17

## 2024-06-17 NOTE — TELEPHONE ENCOUNTER
Called and spoke to Anh the pt's Wife. Okay per HIPAA. Relayed SMM message, Anh v/u. Anh is aware we may be calling back with more recs.

## 2024-06-17 NOTE — TELEPHONE ENCOUNTER
----- Message from Onel López MD sent at 6/15/2024  7:15 AM EDT -----  Monitor showed persistent afib avg HR 63. Nocturnal bradycardia. He should continue eliquis for AC. He takes Toprol XL 12.5mg daily. Will defer to Dr. Lang if safe to continue or if it should be d/c'd due to lower HR's. Will await his response. Thanks.

## 2024-06-21 NOTE — TELEPHONE ENCOUNTER
I spoke with the patients wife. He will hold Metoprolol and continue to wear current cardiac monitor.

## 2024-07-09 ENCOUNTER — TELEPHONE (OUTPATIENT)
Dept: CARDIOLOGY CLINIC | Age: 74
End: 2024-07-09

## 2024-07-09 NOTE — TELEPHONE ENCOUNTER
Intermediate risk clinically for low risk procedure. Proceed as planned. OK to hold eliquis for least amount of time if needed.

## 2024-07-09 NOTE — TELEPHONE ENCOUNTER
[facility] is requesting most recent ov note and EKG tracing for his upcoming catartact surgery with Dr. Janeth Cheek that is scheduled for 7/15/24. If any questions phone number is [phone] and fax is [phone]. Please advise.

## 2024-07-09 NOTE — TELEPHONE ENCOUNTER
Everything, letter EKG and ov notes, have been faxed over and will be scanned into media when confirmation is received.

## 2024-07-09 NOTE — TELEPHONE ENCOUNTER
[facility] returned call stating hey are unsure if clearance is needed, they are gathering info to determine. If so med to be stopped would be ozempic for 7 days

## 2024-07-09 NOTE — TELEPHONE ENCOUNTER
[facility] is requesting most recent ov note and EKG tracing for his upcoming catartact surgery with Dr. Janeth Cheek that is scheduled for 7/15/24. If any questions phone number is [phone] and fax is [phone]. Please advise.      Called [facility] number they left to clarify on cardiac clearance being needed or not, and also to ask if any medications needed stopped. Did not , Left a vm on their line. Completed cardiac clearance with known information as much as possible.    Wallace Nieves, 1950    Cardiac Risk Assessment    What type of procedure are you having?  Cataract surgery     When is your procedure scheduled for?  7/15/24    Medications to be stopped.  N/a    What physician is performing your procedure?  Dr. Janeth Cheek    Phone Number:   [phone]    Fax number to send the letter:   [phone]    Cardiologist:   JOSLYN    Last Appointment:   5/28/24 JOSLYN    Next Appointment:   N/a

## 2024-07-12 LAB
ESTIMATED AVERAGE GLUCOSE: NORMAL
HBA1C MFR BLD: 7.5 %

## 2024-11-18 NOTE — PROGRESS NOTES
Last Year: No     Number of Times Moved in the Last Year: Not on file     Homeless in the Last Year: No        Family History   Problem Relation Age of Onset    Heart Disease Mother     Heart Disease Brother        PE  Vitals:    11/19/24 0935 11/19/24 0946   BP:  100/60   Site:  Left Lower Arm   Position:  Sitting   Cuff Size:  Medium Adult   Pulse:  64   SpO2:  95%   Weight: (!) 186 kg (410 lb)      Estimated body mass index is 64.22 kg/m² as calculated from the following:    Height as of 5/28/24: 1.702 m (5' 7\").    Weight as of this encounter: 186 kg (410 lb).    Physical Exam  Constitutional:       Appearance: He is obese. He is not toxic-appearing.      Comments: Seated in motorized wheelchair   HENT:      Mouth/Throat:      Mouth: Mucous membranes are moist.      Comments: Teeth absent  Neck:      Comments: Negative spurling test  Cardiovascular:      Rate and Rhythm: Normal rate and regular rhythm.      Pulses: Normal pulses.   Pulmonary:      Effort: Pulmonary effort is normal.      Breath sounds: Normal breath sounds.   Abdominal:      General: Bowel sounds are normal.      Palpations: Abdomen is soft.      Tenderness: There is no abdominal tenderness.   Musculoskeletal:      Right hand: No deformity or tenderness. Decreased sensation. Normal pulse.      Right lower leg: Edema (1+) present.      Left lower leg: Edema (1+) present.   Skin:     General: Skin is warm.   Neurological:      Mental Status: He is alert.      Sensory: Sensory deficit (left hand, excluding thumb) present.      Motor: No weakness, tremor or abnormal muscle tone.      Comments: Negative Tinel's and Phalen's test   Psychiatric:         Attention and Perception: He does not perceive auditory or visual hallucinations.         Speech: Speech normal.         Behavior: Behavior normal. Behavior is cooperative.         Thought Content: Thought content is not paranoid. Thought content does not include homicidal or suicidal ideation.

## 2024-11-19 ENCOUNTER — OFFICE VISIT (OUTPATIENT)
Dept: FAMILY MEDICINE CLINIC | Age: 74
End: 2024-11-19
Payer: MEDICARE

## 2024-11-19 VITALS
OXYGEN SATURATION: 95 % | WEIGHT: 315 LBS | HEART RATE: 64 BPM | DIASTOLIC BLOOD PRESSURE: 60 MMHG | BODY MASS INDEX: 64.22 KG/M2 | SYSTOLIC BLOOD PRESSURE: 100 MMHG

## 2024-11-19 DIAGNOSIS — E11.8 DIABETES MELLITUS TYPE 2 WITH COMPLICATIONS (HCC): ICD-10-CM

## 2024-11-19 DIAGNOSIS — F33.2 SEVERE EPISODE OF RECURRENT MAJOR DEPRESSIVE DISORDER, WITHOUT PSYCHOTIC FEATURES (HCC): Chronic | ICD-10-CM

## 2024-11-19 DIAGNOSIS — R20.2 LEFT HAND PARESTHESIA: Primary | ICD-10-CM

## 2024-11-19 PROCEDURE — G8417 CALC BMI ABV UP PARAM F/U: HCPCS

## 2024-11-19 PROCEDURE — G8484 FLU IMMUNIZE NO ADMIN: HCPCS

## 2024-11-19 PROCEDURE — 99204 OFFICE O/P NEW MOD 45 MIN: CPT

## 2024-11-19 PROCEDURE — 3017F COLORECTAL CA SCREEN DOC REV: CPT

## 2024-11-19 PROCEDURE — 1159F MED LIST DOCD IN RCRD: CPT

## 2024-11-19 PROCEDURE — 3044F HG A1C LEVEL LT 7.0%: CPT

## 2024-11-19 PROCEDURE — 1036F TOBACCO NON-USER: CPT

## 2024-11-19 PROCEDURE — 3078F DIAST BP <80 MM HG: CPT

## 2024-11-19 PROCEDURE — 1123F ACP DISCUSS/DSCN MKR DOCD: CPT

## 2024-11-19 PROCEDURE — G8427 DOCREV CUR MEDS BY ELIG CLIN: HCPCS

## 2024-11-19 PROCEDURE — 3074F SYST BP LT 130 MM HG: CPT

## 2024-11-19 PROCEDURE — 2022F DILAT RTA XM EVC RTNOPTHY: CPT

## 2024-11-19 RX ORDER — SEMAGLUTIDE 1.34 MG/ML
1 INJECTION, SOLUTION SUBCUTANEOUS
COMMUNITY

## 2024-11-19 SDOH — ECONOMIC STABILITY: INCOME INSECURITY: HOW HARD IS IT FOR YOU TO PAY FOR THE VERY BASICS LIKE FOOD, HOUSING, MEDICAL CARE, AND HEATING?: NOT HARD AT ALL

## 2024-11-19 SDOH — ECONOMIC STABILITY: FOOD INSECURITY: WITHIN THE PAST 12 MONTHS, THE FOOD YOU BOUGHT JUST DIDN'T LAST AND YOU DIDN'T HAVE MONEY TO GET MORE.: NEVER TRUE

## 2024-11-19 SDOH — ECONOMIC STABILITY: FOOD INSECURITY: WITHIN THE PAST 12 MONTHS, YOU WORRIED THAT YOUR FOOD WOULD RUN OUT BEFORE YOU GOT MONEY TO BUY MORE.: NEVER TRUE

## 2024-11-19 ASSESSMENT — COLUMBIA-SUICIDE SEVERITY RATING SCALE - C-SSRS
5. HAVE YOU STARTED TO WORK OUT OR WORKED OUT THE DETAILS OF HOW TO KILL YOURSELF? DO YOU INTEND TO CARRY OUT THIS PLAN?: NO
3. HAVE YOU BEEN THINKING ABOUT HOW YOU MIGHT KILL YOURSELF?: NO
4. HAVE YOU HAD THESE THOUGHTS AND HAD SOME INTENTION OF ACTING ON THEM?: NO
6. HAVE YOU EVER DONE ANYTHING, STARTED TO DO ANYTHING, OR PREPARED TO DO ANYTHING TO END YOUR LIFE?: NO
1. WITHIN THE PAST MONTH, HAVE YOU WISHED YOU WERE DEAD OR WISHED YOU COULD GO TO SLEEP AND NOT WAKE UP?: YES
2. HAVE YOU ACTUALLY HAD ANY THOUGHTS OF KILLING YOURSELF?: YES

## 2024-11-19 ASSESSMENT — PATIENT HEALTH QUESTIONNAIRE - PHQ9
2. FEELING DOWN, DEPRESSED OR HOPELESS: NEARLY EVERY DAY
SUM OF ALL RESPONSES TO PHQ9 QUESTIONS 1 & 2: 3
4. FEELING TIRED OR HAVING LITTLE ENERGY: NEARLY EVERY DAY
SUM OF ALL RESPONSES TO PHQ QUESTIONS 1-9: 12
SUM OF ALL RESPONSES TO PHQ QUESTIONS 1-9: 10
8. MOVING OR SPEAKING SO SLOWLY THAT OTHER PEOPLE COULD HAVE NOTICED. OR THE OPPOSITE, BEING SO FIGETY OR RESTLESS THAT YOU HAVE BEEN MOVING AROUND A LOT MORE THAN USUAL: NOT AT ALL
SUM OF ALL RESPONSES TO PHQ QUESTIONS 1-9: 12
SUM OF ALL RESPONSES TO PHQ QUESTIONS 1-9: 12
10. IF YOU CHECKED OFF ANY PROBLEMS, HOW DIFFICULT HAVE THESE PROBLEMS MADE IT FOR YOU TO DO YOUR WORK, TAKE CARE OF THINGS AT HOME, OR GET ALONG WITH OTHER PEOPLE: NOT DIFFICULT AT ALL
5. POOR APPETITE OR OVEREATING: NOT AT ALL
3. TROUBLE FALLING OR STAYING ASLEEP: SEVERAL DAYS
1. LITTLE INTEREST OR PLEASURE IN DOING THINGS: NOT AT ALL
7. TROUBLE CONCENTRATING ON THINGS, SUCH AS READING THE NEWSPAPER OR WATCHING TELEVISION: NOT AT ALL
6. FEELING BAD ABOUT YOURSELF - OR THAT YOU ARE A FAILURE OR HAVE LET YOURSELF OR YOUR FAMILY DOWN: NEARLY EVERY DAY
9. THOUGHTS THAT YOU WOULD BE BETTER OFF DEAD, OR OF HURTING YOURSELF: MORE THAN HALF THE DAYS

## 2024-11-19 ASSESSMENT — ENCOUNTER SYMPTOMS
ABDOMINAL PAIN: 0
SHORTNESS OF BREATH: 1
CONSTIPATION: 0
DIARRHEA: 0

## 2024-11-19 NOTE — ASSESSMENT & PLAN NOTE
[facility].       On this date 11/8/2024 I have spent over 60 minutes reviewing previous notes, test results and face to face with the patient discussing the diagnosis and importance of compliance with the treatment plan as well as documenting on the day of the visit.

## 2024-11-19 NOTE — ASSESSMENT & PLAN NOTE
Chronic, not at goal (unstable), per patient, ongoing since CABG surgery.  Lower suspicion for carpal tunnel versus diabetic peripheral neuropathy.  Will refer to hand specialty, as he would likely benefit from nerve conduction study for his left upper extremity.    Orders:    Myrtle Lewis MD, Orthopedic Surgery (Hand, Wrist), [facility]

## 2024-11-19 NOTE — ASSESSMENT & PLAN NOTE
Chronic, not at goal (unstable), extensive discussion in regards to patient's mood, depression, and safety plan.  He does have suicidal ideation without plan, and without previous attempt.  He is high risk considering he is a male, over the age of 50 with firearms at home.  Previous  service. He is not interested in counseling or medical management for depression.  Patient has good support from his wife, and they do have access to the [facility] hotline.

## 2024-12-03 ENCOUNTER — OFFICE VISIT (OUTPATIENT)
Dept: ORTHOPEDIC SURGERY | Age: 74
End: 2024-12-03
Payer: MEDICARE

## 2024-12-03 VITALS — HEIGHT: 67 IN | WEIGHT: 315 LBS | BODY MASS INDEX: 49.44 KG/M2

## 2024-12-03 DIAGNOSIS — G56.22 CUBITAL TUNNEL SYNDROME ON LEFT: ICD-10-CM

## 2024-12-03 DIAGNOSIS — G56.02 LEFT CARPAL TUNNEL SYNDROME: Primary | ICD-10-CM

## 2024-12-03 PROCEDURE — 1123F ACP DISCUSS/DSCN MKR DOCD: CPT | Performed by: STUDENT IN AN ORGANIZED HEALTH CARE EDUCATION/TRAINING PROGRAM

## 2024-12-03 PROCEDURE — G8484 FLU IMMUNIZE NO ADMIN: HCPCS | Performed by: STUDENT IN AN ORGANIZED HEALTH CARE EDUCATION/TRAINING PROGRAM

## 2024-12-03 PROCEDURE — G8427 DOCREV CUR MEDS BY ELIG CLIN: HCPCS | Performed by: STUDENT IN AN ORGANIZED HEALTH CARE EDUCATION/TRAINING PROGRAM

## 2024-12-03 PROCEDURE — 1036F TOBACCO NON-USER: CPT | Performed by: STUDENT IN AN ORGANIZED HEALTH CARE EDUCATION/TRAINING PROGRAM

## 2024-12-03 PROCEDURE — 99203 OFFICE O/P NEW LOW 30 MIN: CPT | Performed by: STUDENT IN AN ORGANIZED HEALTH CARE EDUCATION/TRAINING PROGRAM

## 2024-12-03 PROCEDURE — 3017F COLORECTAL CA SCREEN DOC REV: CPT | Performed by: STUDENT IN AN ORGANIZED HEALTH CARE EDUCATION/TRAINING PROGRAM

## 2024-12-03 PROCEDURE — L3908 WHO COCK-UP NONMOLDE PRE OTS: HCPCS | Performed by: STUDENT IN AN ORGANIZED HEALTH CARE EDUCATION/TRAINING PROGRAM

## 2024-12-03 PROCEDURE — G8417 CALC BMI ABV UP PARAM F/U: HCPCS | Performed by: STUDENT IN AN ORGANIZED HEALTH CARE EDUCATION/TRAINING PROGRAM

## 2024-12-03 PROCEDURE — 1159F MED LIST DOCD IN RCRD: CPT | Performed by: STUDENT IN AN ORGANIZED HEALTH CARE EDUCATION/TRAINING PROGRAM

## 2024-12-03 NOTE — PROGRESS NOTES
Hand, Upper Extremity and Reconstructive Surgery                Myrtle Martinez MD                                           Summary of Upper Extremity Problems and Interventions     Diagnosis: Left hand concerns    History of Present Illness     Wallace Nieves is a 74 y.o. right hand dominant male who presents with left hand concerns.  Patient reports left hand numbness and tingling since 2016.  He correlates this with about the time that he got his open heart surgery.  Simultaneously the patient reports that his left hand always feels cold.  He denies cold sensitivity necessarily.  His symptoms are in all of his fingers except his thumb.  He has worn a brace at night but states it makes his symptoms worse.  He presents today with his wife.  Denies neck pain or stiffness.  He is on Eliquis.    Pertinent past medical history:    Diabetes positive, last hemoglobin A1c of 6.7 in our medical record   Thyroid disease negative   Autoimmune disease negative   Wrist trauma denies change in visual acuity   Generalized peripheral neuropathy negative    Allergies     Allergies   Allergen Reactions    Adhesive Tape Other (See Comments)     Blisters    Duloxetine Headaches    Stadol [Butorphanol Tartrate] Other (See Comments)     Hypotension       Home Medications     Current Outpatient Medications   Medication Instructions    allopurinol (ZYLOPRIM) 300 mg, Oral, DAILY, Supplied by the [facility].    apixaban (ELIQUIS) 5 mg, Oral, 2 TIMES DAILY    aspirin 81 mg, Oral, DAILY    atorvastatin (LIPITOR) 20 mg, Oral, Nightly, Supplied by the [facility].    betamethasone valerate (VALISONE) 0.1 % cream Topical, DAILY    dorzolamide-timolol (COSOPT) 2-0.5 % ophthalmic solution 1 drop, Both Eyes, 2 TIMES DAILY, Supplied by the [facility].    insulin aspart (NOVOLOG) 20 Units, SubCUTAneous, 2 TIMES DAILY    insulin NPH (HUMULIN N;NOVOLIN N) 33 Units, SubCUTAneous, 2 TIMES DAILY BEFORE MEALS, Supplied by the [facility].<BR>33 units

## 2024-12-23 ENCOUNTER — TELEPHONE (OUTPATIENT)
Dept: FAMILY MEDICINE CLINIC | Age: 74
End: 2024-12-23

## 2024-12-27 ENCOUNTER — TELEPHONE (OUTPATIENT)
Dept: ORTHOPEDIC SURGERY | Age: 74
End: 2024-12-27

## 2024-12-27 NOTE — TELEPHONE ENCOUNTER
General Question     Subject: REQUESTING A CALL ABOUT HIS EMG.  Patient and /or Facility Request: Soo Nieves   Contact Number: [phone]

## 2025-01-02 RX ORDER — VALSARTAN 80 MG/1
80 TABLET ORAL DAILY
Qty: 30 TABLET | Refills: 2 | Status: SHIPPED | OUTPATIENT
Start: 2025-01-02

## 2025-01-02 RX ORDER — SPIRONOLACTONE 25 MG/1
25 TABLET ORAL DAILY
Qty: 30 TABLET | Refills: 2 | Status: SHIPPED | OUTPATIENT
Start: 2025-01-02

## 2025-01-02 RX ORDER — TORSEMIDE 20 MG/1
40 TABLET ORAL DAILY
Qty: 60 TABLET | Refills: 2 | Status: SHIPPED | OUTPATIENT
Start: 2025-01-02

## 2025-01-02 RX ORDER — ATORVASTATIN CALCIUM 40 MG/1
20 TABLET, FILM COATED ORAL NIGHTLY
Qty: 30 TABLET | Refills: 2 | Status: SHIPPED | OUTPATIENT
Start: 2025-01-02

## 2025-01-02 NOTE — TELEPHONE ENCOUNTER
Refill Request     CONFIRM preferrred pharmacy with the patient.    If Mail Order Rx - Pend for 90 day refill.      Last Seen: Last Seen Department: 11/19/2024  Last Seen by PCP: 11/19/2024    Last Written:     If no future appointment scheduled, route STAFF MESSAGE with patient name to the  Pool for scheduling.      Next Appointment:   Future Appointments   Date Time Provider Department Center   1/7/2025  1:45 PM Myrtle Martinez MD [facility]   1/30/2025  1:00 PM Aixa Howell APRN - CNP [facility]   5/20/2025  2:40 PM [facility]   5/21/2025 10:30 AM Rafaela Wong MD [facility] DE[facility]       Message sent to  to schedule appt with patient?  NO      Requested Prescriptions     Pending Prescriptions Disp Refills    valsartan (DIOVAN) 80 MG tablet 30 tablet 3     Sig: Take 1 tablet by mouth daily    spironolactone (ALDACTONE) 25 MG tablet 30 tablet 3     Sig: Take 1 tablet by mouth daily    torsemide (DEMADEX) 20 MG tablet 60 tablet 2     Sig: Take 2 tablets by mouth daily

## 2025-01-07 ENCOUNTER — TELEPHONE (OUTPATIENT)
Dept: FAMILY MEDICINE CLINIC | Age: 75
End: 2025-01-07

## 2025-01-07 NOTE — TELEPHONE ENCOUNTER
Pt's wife called and stated that we called in pt's atorvastatin for 40 mg. Was wanting to see if we could call it in for the 20 mg so she don't have to cut it into. And they use [facility] pharmacy

## 2025-01-08 DIAGNOSIS — E78.5 HYPERLIPIDEMIA, UNSPECIFIED HYPERLIPIDEMIA TYPE: Primary | ICD-10-CM

## 2025-01-08 RX ORDER — ATORVASTATIN CALCIUM 20 MG/1
20 TABLET, FILM COATED ORAL DAILY
Qty: 90 TABLET | Refills: 0 | Status: SHIPPED | OUTPATIENT
Start: 2025-01-08

## 2025-01-14 ENCOUNTER — OFFICE VISIT (OUTPATIENT)
Dept: ORTHOPEDIC SURGERY | Age: 75
End: 2025-01-14
Payer: COMMERCIAL

## 2025-01-14 VITALS — BODY MASS INDEX: 49.44 KG/M2 | WEIGHT: 315 LBS | HEIGHT: 67 IN

## 2025-01-14 DIAGNOSIS — R20.0 HAND NUMBNESS: Primary | ICD-10-CM

## 2025-01-14 PROCEDURE — 1159F MED LIST DOCD IN RCRD: CPT | Performed by: STUDENT IN AN ORGANIZED HEALTH CARE EDUCATION/TRAINING PROGRAM

## 2025-01-14 PROCEDURE — 99213 OFFICE O/P EST LOW 20 MIN: CPT | Performed by: STUDENT IN AN ORGANIZED HEALTH CARE EDUCATION/TRAINING PROGRAM

## 2025-01-14 PROCEDURE — 1123F ACP DISCUSS/DSCN MKR DOCD: CPT | Performed by: STUDENT IN AN ORGANIZED HEALTH CARE EDUCATION/TRAINING PROGRAM

## 2025-01-14 NOTE — PROGRESS NOTES
sensitivity.  EMG was reviewed with the patient.  It was explained that his paresthesias are likely secondary to neuropathy from diabetes.  He was encouraged to continue to have good glycemic control to help prevent further nerve damage.  Based on his EMG findings at do not think he would benefit from carpal tunnel release.  His hand cold sensitivity is likely secondary to radial artery clotting.  He was encouraged to wear gloves and keep his hands warm and cold weather.  Patient expressed understanding and agrees with the plan.  He can follow-up on as-needed basis.  No orders of the defined types were placed in this encounter.

## 2025-01-29 NOTE — PROGRESS NOTES
[facility]   Electrophysiology Outpatient Note              Date:  January 29, 2025  Patient name: Wallace Nieves  YOB: 1950    Primary Care physician: Rafaela Wong MD    HISTORY OF PRESENT ILLNESS: The patient is a 75 y.o.  male with a history of atrial flutter, atrial flutter ablation, heart failure  Patient follows with Dr. Lang in EP clinic.  2/10/2024 patient was admitted with chest pain and shortness of breath and found to be in atrial flutter.  On 2/12/2024 patient had EPS with atrial flutter ablation.  On 5/23/2024 patient was seen in EP clinic.  ECG reveals possible Wenkebach 53 bpm.  He wore 2-week cardiac monitor which revealed average heart rate 63 bpm with normal AV conduction.  He had no pauses and rare PVCs.  Will consider ILR    Today he is being seen for history of paroxysmal patient in atrial flutter. ECG shows AF rate 66.  Patient is accompanied by his wife today. patient denies chest pain, palpitations, and shortness of breath.  Patient states he is doing well overall.  His weight today is 410 pounds.  Patient states he has been on Ozempic for approximately a year and is lost about 50 to 60 pounds.  He recently spoke to his primary care physician and they are going to increase the dose as his weight loss has stopped.  Since starting Ozempic he is able to walk about 20 feet.  He is hoping he can increase his activity after losing more weight.  Also discussed about monitoring heart rates at home.  Patient and wife have a finger probe and can check his heart rates to ensure that he is not consistently dropping below 50    Past Medical History:   has a past medical history of Arthritis, Asthma, BiPAP (biphasic positive airway pressure) dependence, CHF (congestive heart failure) (HCC), Colon cancer (HCC), CPAP (continuous positive airway pressure) dependence, Depression, Diabetes mellitus (HCC), MRSA (methicillin resistant staph aureus) culture positive, PE

## 2025-01-30 ENCOUNTER — OFFICE VISIT (OUTPATIENT)
Dept: CARDIOLOGY CLINIC | Age: 75
End: 2025-01-30
Payer: COMMERCIAL

## 2025-01-30 VITALS
WEIGHT: 315 LBS | SYSTOLIC BLOOD PRESSURE: 130 MMHG | DIASTOLIC BLOOD PRESSURE: 66 MMHG | HEART RATE: 66 BPM | OXYGEN SATURATION: 95 % | HEIGHT: 67 IN | BODY MASS INDEX: 49.44 KG/M2

## 2025-01-30 DIAGNOSIS — I48.0 PAF (PAROXYSMAL ATRIAL FIBRILLATION) (HCC): Primary | ICD-10-CM

## 2025-01-30 DIAGNOSIS — R00.1 BRADYCARDIA: ICD-10-CM

## 2025-01-30 DIAGNOSIS — I48.3 TYPICAL ATRIAL FLUTTER (HCC): ICD-10-CM

## 2025-01-30 PROCEDURE — 1160F RVW MEDS BY RX/DR IN RCRD: CPT

## 2025-01-30 PROCEDURE — 99214 OFFICE O/P EST MOD 30 MIN: CPT

## 2025-01-30 PROCEDURE — 1159F MED LIST DOCD IN RCRD: CPT

## 2025-01-30 PROCEDURE — 3075F SYST BP GE 130 - 139MM HG: CPT

## 2025-01-30 PROCEDURE — 1123F ACP DISCUSS/DSCN MKR DOCD: CPT

## 2025-01-30 PROCEDURE — 93000 ELECTROCARDIOGRAM COMPLETE: CPT

## 2025-01-30 PROCEDURE — 3078F DIAST BP <80 MM HG: CPT

## 2025-01-30 NOTE — PATIENT INSTRUCTIONS
Continue Eliquis 5 mg twice daily for stroke risk reduction  Continue aspirin 81 mg daily  Continue spironolactone 25 mg daily  Continue Diovan 80 mg daily  Continue Demadex 40 mg daily  Continue with Ozempic as directed  Use finger probe to assess heart rates    Follow up in 6 months Aixa IRELAND

## 2025-03-28 DIAGNOSIS — E78.5 HYPERLIPIDEMIA, UNSPECIFIED HYPERLIPIDEMIA TYPE: ICD-10-CM

## 2025-03-28 RX ORDER — ATORVASTATIN CALCIUM 20 MG/1
20 TABLET, FILM COATED ORAL DAILY
Qty: 90 TABLET | Refills: 0 | Status: SHIPPED | OUTPATIENT
Start: 2025-03-28

## 2025-03-28 NOTE — TELEPHONE ENCOUNTER
Refill Request     CONFIRM preferrred pharmacy with the patient.    If Mail Order Rx - Pend for 90 day refill.      Last Seen: Last Seen Department: 11/19/2024  Last Seen by PCP: 11/19/2024    Last Written: 1/8/25    If no future appointment scheduled, route STAFF MESSAGE with patient name to the  Pool for scheduling.      Next Appointment:   Future Appointments   Date Time Provider Department Center   5/21/2025 10:30 AM [facility]   6/13/2025  9:30 AM Onel López MD [facility]   7/30/2025  1:00 PM Aixa Howell, MIROSLAVA - CNP [facility]       Message sent to  to schedule appt with patient?  N/A      Requested Prescriptions     Pending Prescriptions Disp Refills    atorvastatin (LIPITOR) 20 MG tablet 90 tablet 0     Sig: Take 1 tablet by mouth daily

## 2025-04-22 NOTE — PROGRESS NOTES
Patient's  shows they are overdue for Hemoglobin A1C  Care Everywhere and  files searched.   updated with 7/12/24 A1C result.

## 2025-05-04 ENCOUNTER — APPOINTMENT (OUTPATIENT)
Dept: GENERAL RADIOLOGY | Age: 75
End: 2025-05-04
Payer: COMMERCIAL

## 2025-05-04 ENCOUNTER — HOSPITAL ENCOUNTER (EMERGENCY)
Age: 75
Discharge: HOME | End: 2025-05-04
Attending: STUDENT IN AN ORGANIZED HEALTH CARE EDUCATION/TRAINING PROGRAM
Payer: COMMERCIAL

## 2025-05-04 ENCOUNTER — APPOINTMENT (OUTPATIENT)
Dept: CT IMAGING | Age: 75
End: 2025-05-04
Payer: COMMERCIAL

## 2025-05-04 DIAGNOSIS — S09.90XA CLOSED HEAD INJURY, INITIAL ENCOUNTER: Primary | ICD-10-CM
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
## 2025-05-04 LAB
ALBUMIN SERPL-MCNC: 3.5 G/DL (ref 3.4–5)
ALBUMIN/GLOB SERPL: 1.4 {RATIO} (ref 1.1–2.2)
ALP SERPL-CCNC: 82 U/L (ref 40–129)
ALT SERPL-CCNC: 16 U/L (ref 10–40)
ANION GAP SERPL CALCULATED.3IONS-SCNC: 12 MMOL/L (ref 3–16)
AST SERPL-CCNC: 20 U/L (ref 15–37)
BASOPHILS # BLD: 0 K/UL (ref 0–0.2)
BASOPHILS NFR BLD: 0.3 %
BILIRUB SERPL-MCNC: 0.5 MG/DL (ref 0–1)
BUN SERPL-MCNC: 17 MG/DL (ref 7–20)
CALCIUM SERPL-MCNC: 9.6 MG/DL (ref 8.3–10.6)
CHLORIDE SERPL-SCNC: 101 MMOL/L (ref 99–110)
CO2 SERPL-SCNC: 25 MMOL/L (ref 21–32)
CREAT SERPL-MCNC: 1.1 MG/DL (ref 0.8–1.3)
DEPRECATED RDW RBC AUTO: 16.1 % (ref 12.4–15.4)
EOSINOPHIL # BLD: 1.1 K/UL (ref 0–0.6)
EOSINOPHIL NFR BLD: 10.2 %
GFR SERPLBLD CREATININE-BSD FMLA CKD-EPI: 70 ML/MIN/{1.73_M2}
GLUCOSE SERPL-MCNC: 98 MG/DL (ref 70–99)
HCT VFR BLD AUTO: 36.4 % (ref 40.5–52.5)
HGB BLD-MCNC: 12 G/DL (ref 13.5–17.5)
LYMPHOCYTES # BLD: 2.4 K/UL (ref 1–5.1)
LYMPHOCYTES NFR BLD: 22.2 %
MCH RBC QN AUTO: 30.1 PG (ref 26–34)
MCHC RBC AUTO-ENTMCNC: 32.9 G/DL (ref 31–36)
MCV RBC AUTO: 91.4 FL (ref 80–100)
MONOCYTES # BLD: 0.9 K/UL (ref 0–1.3)
MONOCYTES NFR BLD: 8.1 %
NEUTROPHILS # BLD: 6.5 K/UL (ref 1.7–7.7)
NEUTROPHILS NFR BLD: 59.2 %
NT-PROBNP SERPL-MCNC: 216 PG/ML (ref 0–449)
PLATELET # BLD AUTO: 220 K/UL (ref 135–450)
PMV BLD AUTO: 8 FL (ref 5–10.5)
POTASSIUM SERPL-SCNC: 4.3 MMOL/L (ref 3.5–5.1)
PROT SERPL-MCNC: 6 G/DL (ref 6.4–8.2)
RBC # BLD AUTO: 3.98 M/UL (ref 4.2–5.9)
SODIUM SERPL-SCNC: 138 MMOL/L (ref 136–145)
TROPONIN, HIGH SENSITIVITY: 23 NG/L (ref 0–22)
TROPONIN, HIGH SENSITIVITY: 24 NG/L (ref 0–22)
WBC # BLD AUTO: 10.9 K/UL (ref 4–11)

## 2025-05-04 PROCEDURE — 96374 THER/PROPH/DIAG INJ IV PUSH: CPT

## 2025-05-04 PROCEDURE — 72125 CT NECK SPINE W/O DYE: CPT

## 2025-05-04 PROCEDURE — 6360000002 HC RX W HCPCS

## 2025-05-04 PROCEDURE — 84484 ASSAY OF TROPONIN QUANT: CPT

## 2025-05-04 PROCEDURE — 96375 TX/PRO/DX INJ NEW DRUG ADDON: CPT

## 2025-05-04 PROCEDURE — 6360000002 HC RX W HCPCS: Performed by: STUDENT IN AN ORGANIZED HEALTH CARE EDUCATION/TRAINING PROGRAM

## 2025-05-04 PROCEDURE — 72131 CT LUMBAR SPINE W/O DYE: CPT

## 2025-05-04 PROCEDURE — 2580000003 HC RX 258: Performed by: STUDENT IN AN ORGANIZED HEALTH CARE EDUCATION/TRAINING PROGRAM

## 2025-05-04 PROCEDURE — 99285 EMERGENCY DEPT VISIT HI MDM: CPT

## 2025-05-04 PROCEDURE — 71045 X-RAY EXAM CHEST 1 VIEW: CPT

## 2025-05-04 PROCEDURE — 73110 X-RAY EXAM OF WRIST: CPT

## 2025-05-04 PROCEDURE — 80053 COMPREHEN METABOLIC PANEL: CPT

## 2025-05-04 PROCEDURE — 85025 COMPLETE CBC W/AUTO DIFF WBC: CPT

## 2025-05-04 PROCEDURE — 72128 CT CHEST SPINE W/O DYE: CPT

## 2025-05-04 PROCEDURE — 83880 ASSAY OF NATRIURETIC PEPTIDE: CPT

## 2025-05-04 PROCEDURE — 73090 X-RAY EXAM OF FOREARM: CPT

## 2025-05-04 PROCEDURE — 93005 ELECTROCARDIOGRAM TRACING: CPT | Performed by: STUDENT IN AN ORGANIZED HEALTH CARE EDUCATION/TRAINING PROGRAM

## 2025-05-04 PROCEDURE — 70450 CT HEAD/BRAIN W/O DYE: CPT

## 2025-05-04 RX ORDER — DIPHENHYDRAMINE HYDROCHLORIDE 50 MG/ML
10 INJECTION, SOLUTION INTRAMUSCULAR; INTRAVENOUS ONCE
Status: COMPLETED | OUTPATIENT
Start: 2025-05-04 | End: 2025-05-04

## 2025-05-04 RX ORDER — SODIUM CHLORIDE, SODIUM LACTATE, POTASSIUM CHLORIDE, AND CALCIUM CHLORIDE .6; .31; .03; .02 G/100ML; G/100ML; G/100ML; G/100ML
500 INJECTION, SOLUTION INTRAVENOUS ONCE
Status: COMPLETED | OUTPATIENT
Start: 2025-05-04 | End: 2025-05-04

## 2025-05-04 RX ORDER — METOCLOPRAMIDE HYDROCHLORIDE 5 MG/ML
10 INJECTION INTRAMUSCULAR; INTRAVENOUS ONCE
Status: COMPLETED | OUTPATIENT
Start: 2025-05-04 | End: 2025-05-04

## 2025-05-04 RX ADMIN — DIPHENHYDRAMINE HYDROCHLORIDE 10 MG: 50 INJECTION INTRAMUSCULAR; INTRAVENOUS at 22:36

## 2025-05-04 RX ADMIN — METOCLOPRAMIDE 10 MG: 5 INJECTION, SOLUTION INTRAMUSCULAR; INTRAVENOUS at 22:37

## 2025-05-04 RX ADMIN — HYDROMORPHONE HYDROCHLORIDE 0.5 MG: 1 INJECTION, SOLUTION INTRAMUSCULAR; INTRAVENOUS; SUBCUTANEOUS at 21:05

## 2025-05-04 RX ADMIN — SODIUM CHLORIDE, SODIUM LACTATE, POTASSIUM CHLORIDE, AND CALCIUM CHLORIDE 500 ML: .6; .31; .03; .02 INJECTION, SOLUTION INTRAVENOUS at 22:41

## 2025-05-04 ASSESSMENT — PAIN SCALES - GENERAL: PAINLEVEL_OUTOF10: 10

## 2025-05-04 ASSESSMENT — PAIN DESCRIPTION - LOCATION: LOCATION: BACK;HEAD

## 2025-05-04 ASSESSMENT — PAIN DESCRIPTION - DESCRIPTORS: DESCRIPTORS: SHARP

## 2025-05-04 ASSESSMENT — LIFESTYLE VARIABLES
HOW MANY STANDARD DRINKS CONTAINING ALCOHOL DO YOU HAVE ON A TYPICAL DAY: 1 OR 2
HOW OFTEN DO YOU HAVE A DRINK CONTAINING ALCOHOL: MONTHLY OR LESS

## 2025-05-04 ASSESSMENT — PAIN - FUNCTIONAL ASSESSMENT: PAIN_FUNCTIONAL_ASSESSMENT: 0-10

## 2025-05-05 VITALS
WEIGHT: 315 LBS | OXYGEN SATURATION: 96 % | HEART RATE: 58 BPM | SYSTOLIC BLOOD PRESSURE: 121 MMHG | TEMPERATURE: 98.4 F | DIASTOLIC BLOOD PRESSURE: 64 MMHG | BODY MASS INDEX: 49.44 KG/M2 | HEIGHT: 67 IN | RESPIRATION RATE: 15 BRPM

## 2025-05-05 LAB
EKG DIAGNOSIS: NORMAL
EKG Q-T INTERVAL: 460 MS
EKG QRS DURATION: 140 MS
EKG QTC CALCULATION (BAZETT): 460 MS
EKG R AXIS: -58 DEGREES
EKG T AXIS: 107 DEGREES
EKG VENTRICULAR RATE: 60 BPM

## 2025-05-05 PROCEDURE — 93010 ELECTROCARDIOGRAM REPORT: CPT | Performed by: INTERNAL MEDICINE

## 2025-05-05 RX ORDER — VALSARTAN 80 MG/1
80 TABLET ORAL DAILY
Qty: 90 TABLET | Refills: 0 | Status: SHIPPED | OUTPATIENT
Start: 2025-05-05

## 2025-05-05 NOTE — DISCHARGE INSTRUCTIONS
To the nearest ED if you develop worsening dizziness, headaches, nausea and vomiting, other concerning symptoms.

## 2025-05-05 NOTE — TELEPHONE ENCOUNTER
Refill Request     CONFIRM preferrred pharmacy with the patient.    If Mail Order Rx - Pend for 90 day refill.      Last Seen: Last Seen Department: 11/19/2024  Last Seen by PCP: 11/19/2024    Last Written: 1/2/25    If no future appointment scheduled, route STAFF MESSAGE with patient name to the  Pool for scheduling.      Next Appointment:   Future Appointments   Date Time Provider Department Center   5/21/2025 10:30 AM [facility]   6/13/2025  9:30 AM Onel López MD [facility]   7/30/2025  1:00 PM Aixa Howell, MIROSLAVA - CNP [facility]       Message sent to  to schedule appt with patient?  N/A      Requested Prescriptions     Pending Prescriptions Disp Refills    valsartan (DIOVAN) 80 MG tablet 30 tablet 2     Sig: Take 1 tablet by mouth daily

## 2025-05-05 NOTE — ED PROVIDER NOTES
I independently examined and evaluated Wallace Nieves.  I personally saw the patient and performed a substantive portion of the visit including all aspects of the medical decision making.    In brief, this 75-year-old male is presenting after he tripped and fell, landing backwards, hitting his head.  States that since then he has been having a headache and some dizziness.  Also endorses diffuse pain including chest pain.  States that last week he had a stomach virus and was having nausea vomiting diarrhea, though states that has improved this week.  Denies any fevers, chills..    Focused exam revealed   General: Alert, no acute distress, patient resting comfortably   Skin: warm, intact, no pallor noted   Head: Normocephalic, atraumatic   Eye: Normal conjunctiva   Cardiac: Normal peripheral perfusion  Respiratory: No acute distress   Musculoskeletal: No deformity, full ROM.  Tenderness of the left wrist.  Neurological: alert and oriented, normal sensory and motor observed.   Psychiatric: Cooperative    ED course: Due to patient's chest pain cardiac workup was obtained and is overall reassuring.  Mildly elevated troponin remained stable on repeat.  No ischemic pattern EKG and no dysrhythmia noted.  CT head, C/T/L-spine obtained and shows no acute process.  Chest x-ray shows no acute process.  Due to later complaints of left arm pain x-ray of the left wrist and radius/ulna obtained and shows no acute process.  Treated with hydromorphone with improvement in pain.  On reevaluation patient still feeling dizzy just with sitting up.  Was given a 500 cc fluid bolus and given Reglan Benadryl for his headache.  On reevaluation he is greatly improved and is able to stand up without dizziness and walk around the room.  Due to the significant improvement and reassuring workup I do think patient is reasonable for discharge home.  He is given strict return precautions for worsening headaches, dizziness, other concerning 
03/04/2015    mid LAD Promus Premiere 2.75x28    CORONARY ARTERY BYPASS GRAFT  05/02/2017    Coronary artery bypass ×2 reverse saphenous vein to LAD reverse saphenous vein to circumflex    HERNIA REPAIR      KNEE ARTHROPLASTY Left     SHOULDER ARTHROPLASTY Right        CURRENTMEDICATIONS       Previous Medications    ALLOPURINOL (ZYLOPRIM) 300 MG TABLET    Take 1 tablet by mouth daily Supplied by the [facility].    APIXABAN (ELIQUIS) 5 MG TABS TABLET    Take 1 tablet by mouth 2 times daily    ASPIRIN 81 MG EC TABLET    Take 1 tablet by mouth daily    ATORVASTATIN (LIPITOR) 20 MG TABLET    Take 1 tablet by mouth daily    BETAMETHASONE VALERATE (VALISONE) 0.1 % CREAM    Apply topically daily    DORZOLAMIDE-TIMOLOL (COSOPT) 2-0.5 % OPHTHALMIC SOLUTION    Place 1 drop into both eyes 2 times daily Supplied by the [facility].    INSULIN ASPART (NOVOLOG) 100 UNIT/ML INJECTION VIAL    Inject 20 Units into the skin 2 times daily    INSULIN NPH (HUMULIN N;NOVOLIN N) 100 UNIT/ML INJECTION VIAL    Inject 33 Units into the skin 2 times daily (before meals) Supplied by the [facility].  33 units morning and night    LATANOPROST (XALATAN) 0.005 % OPHTHALMIC SOLUTION    Place 1 drop into both eyes nightly Supplied by the [facility].    MISC. DEVICES (NOVA BATH SEAT) MISC    by Does not apply route    OMEPRAZOLE (PRILOSEC OTC) 20 MG TABLET    Take 2 tablets by mouth daily    SEMAGLUTIDE, 1 MG/DOSE, (OZEMPIC, 1 MG/DOSE,) 4 MG/3ML SOPN SC INJECTION    Inject 1 mg into the skin every 7 days    SPIRONOLACTONE (ALDACTONE) 25 MG TABLET    Take 1 tablet by mouth daily    TIMOLOL (BETIMOL) 0.5 % OPHTHALMIC SOLUTION    1 drop 2 times daily    TORSEMIDE (DEMADEX) 20 MG TABLET    Take 2 tablets by mouth daily    VALSARTAN (DIOVAN) 80 MG TABLET    Take 1 tablet by mouth daily    VITAMIN D (CHOLECALCIFEROL) 25 MCG (1000 UT) TABS TABLET    Take 1 tablet by mouth daily Supplied by the [facility].       ALLERGIES     Adhesive tape, Duloxetine, and Stadol [butorphanol

## 2025-05-14 RX ORDER — SPIRONOLACTONE 25 MG/1
25 TABLET ORAL DAILY
Qty: 30 TABLET | Refills: 2 | Status: SHIPPED | OUTPATIENT
Start: 2025-05-14

## 2025-05-14 RX ORDER — TORSEMIDE 20 MG/1
40 TABLET ORAL DAILY
Qty: 60 TABLET | Refills: 2 | Status: SHIPPED | OUTPATIENT
Start: 2025-05-14

## 2025-05-14 NOTE — TELEPHONE ENCOUNTER
Refill Request     CONFIRM preferrred pharmacy with the patient.    If Mail Order Rx - Pend for 90 day refill.      Last Seen: Last Seen Department: 11/19/2024  Last Seen by PCP: 11/19/2024    Last Written: 1-1-2025    If no future appointment scheduled, route STAFF MESSAGE with patient name to the  Pool for scheduling.      Next Appointment:   Future Appointments   Date Time Provider Department Center   5/21/2025 10:30 AM [facility]   6/13/2025  9:30 AM Onel López MD [facility]   7/30/2025  1:00 PM Aixa Howell, APRN - CNP [facility]       Message sent to  to schedule appt with patient?  N/A      Requested Prescriptions     Pending Prescriptions Disp Refills    spironolactone (ALDACTONE) 25 MG tablet [Pharmacy Med Name: SPIRONOLACTONE 25 MG TABLET] 30 tablet 2     Sig: TAKE 1 TABLET BY MOUTH DAILY    torsemide (DEMADEX) 20 MG tablet [Pharmacy Med Name: TORSEMIDE 20 MG TABLET] 60 tablet 2     Sig: TAKE 2 TABLETS BY MOUTH DAILY       [General Appearance - Well Developed] : well developed [Normal Appearance] : normal appearance [Well Groomed] : well groomed [General Appearance - Well Nourished] : well nourished [No Deformities] : no deformities [General Appearance - In No Acute Distress] : no acute distress [Normal Conjunctiva] : the conjunctiva exhibited no abnormalities [Eyelids - No Xanthelasma] : the eyelids demonstrated no xanthelasmas [Normal Oral Mucosa] : normal oral mucosa [No Oral Pallor] : no oral pallor [No Oral Cyanosis] : no oral cyanosis [Normal Jugular Venous A Waves Present] : normal jugular venous A waves present [Normal Jugular Venous V Waves Present] : normal jugular venous V waves present [No Jugular Venous Emery A Waves] : no jugular venous emery A waves [Heart Rate And Rhythm] : heart rate and rhythm were normal [Heart Sounds] : normal S1 and S2 [Murmurs] : no murmurs present [Respiration, Rhythm And Depth] : normal respiratory rhythm and effort [Exaggerated Use Of Accessory Muscles For Inspiration] : no accessory muscle use [Auscultation Breath Sounds / Voice Sounds] : lungs were clear to auscultation bilaterally [Abdomen Soft] : soft [Abdomen Tenderness] : non-tender [Abdomen Mass (___ Cm)] : no abdominal mass palpated [Abnormal Walk] : normal gait [Gait - Sufficient For Exercise Testing] : the gait was sufficient for exercise testing [Nail Clubbing] : no clubbing of the fingernails [Cyanosis, Localized] : no localized cyanosis [Petechial Hemorrhages (___cm)] : no petechial hemorrhages [] : no ischemic changes

## 2025-05-17 NOTE — FLOWSHEET NOTE
02/09/24 0701   Vital Signs   Temp 97.4 °F (36.3 °C)   Temp Source Axillary   Pulse 56   Heart Rate Source Monitor   Respirations 20   BP (!) 108/48   MAP (Calculated) 68   BP Location Right lower arm   BP Method Automatic   Patient Position Lying left side   Oxygen Therapy   SpO2 94 %   O2 Device PAP (positive airway pressure)     Shift assessment completed. See flow sheet. Medications given. Patient is A&O x4. Vitals are stable and Patient denies further needs. Patient is currently on RACall light within reach.  
   02/09/24 1050   Vital Signs   Temp 97.3 °F (36.3 °C)   Temp Source Oral   Pulse 57   Heart Rate Source Monitor   Respirations 20   BP (!) 174/79   MAP (Calculated) 111   BP Location Right lower arm   BP Method Automatic   Patient Position Up in chair;Sitting   Oxygen Therapy   SpO2 93 %   O2 Device None (Room air)     Vitals are stable. Patient is up to chair. Wife present Patient denies further needs. Call light within reach  
   02/09/24 1519   Vital Signs   Temp 97.5 °F (36.4 °C)   Temp Source Oral   Pulse 58   Heart Rate Source Monitor   Respirations 20   BP (!) 171/73   MAP (Calculated) 106   BP Location Right lower arm   BP Method Automatic   Patient Position Up in chair;Sitting   Oxygen Therapy   SpO2 96 %   O2 Device None (Room air)     BP elevated. All other vitals stable. Patient denies further needs. Call light within reach.  
   02/09/24 2025   Pain Assessment   Pain Assessment None - Denies Pain   Opioid-Induced Sedation   POSS Score 1     Shift assessment is complete, see flow sheet. Pt denies c/o pain at this time. Pt given water per request. Pt A&OX4 with call light within reach and bed alarm on.   
   02/09/24 2245   Treatment Team Notification   Reason for Communication Abnormal vitals   Type of Critical Result Cardiology   Person Result Received From Tiff, RN   Critical Cardiology Result Type Other (comment)  (2.52 second pause)   Name of Team Member Notified Eber Perez NP   Treatment Team Role Advanced Practice Nurse   Method of Communication Secure Message   Notification Time 2245     NP notified of Pt's 2.52 second pause.   
   02/10/24 0655   Vital Signs   Temp 98 °F (36.7 °C)   Temp Source Axillary   Pulse 54   Heart Rate Source Monitor   Respirations 18   BP (!) 160/54   MAP (Calculated) 89   BP Location Right lower arm   BP Method Automatic   Patient Position Semi fowlers   Oxygen Therapy   SpO2 95 %   O2 Device PAP (positive airway pressure)     Shift assessment completed. See flow sheet. Medication given. Patient is resting but awaken easily. Vitals are stable. BP retaken and is 142/50. Patient denies further needs. Call light within reach.  
   02/10/24 1058   Vital Signs   Temp 97.6 °F (36.4 °C)   Temp Source Axillary   Pulse 59   Heart Rate Source Monitor   Respirations 18   BP (!) 120/49   MAP (Calculated) 73   BP Location Right lower arm   BP Method Automatic   Patient Position Lying left side   Oxygen Therapy   SpO2 94 %   O2 Device PAP (positive airway pressure)     Vitals remain stable. Patient is A&O x4 and denies further needs. Call light within reach.  
normal

## 2025-05-20 DIAGNOSIS — E11.8 DIABETES MELLITUS TYPE 2 WITH COMPLICATIONS (HCC): ICD-10-CM

## 2025-05-21 ENCOUNTER — OFFICE VISIT (OUTPATIENT)
Dept: FAMILY MEDICINE CLINIC | Age: 75
End: 2025-05-21
Payer: COMMERCIAL

## 2025-05-21 VITALS
WEIGHT: 315 LBS | OXYGEN SATURATION: 94 % | SYSTOLIC BLOOD PRESSURE: 122 MMHG | DIASTOLIC BLOOD PRESSURE: 73 MMHG | BODY MASS INDEX: 61.4 KG/M2 | RESPIRATION RATE: 20 BRPM

## 2025-05-21 DIAGNOSIS — F33.2 SEVERE EPISODE OF RECURRENT MAJOR DEPRESSIVE DISORDER, WITHOUT PSYCHOTIC FEATURES (HCC): Chronic | ICD-10-CM

## 2025-05-21 DIAGNOSIS — I10 ESSENTIAL HYPERTENSION: Primary | ICD-10-CM

## 2025-05-21 DIAGNOSIS — R01.1 MURMUR, CARDIAC: ICD-10-CM

## 2025-05-21 DIAGNOSIS — E78.2 MIXED HYPERLIPIDEMIA: Chronic | ICD-10-CM

## 2025-05-21 DIAGNOSIS — E11.8 DIABETES MELLITUS TYPE 2 WITH COMPLICATIONS (HCC): ICD-10-CM

## 2025-05-21 DIAGNOSIS — G47.33 OSA (OBSTRUCTIVE SLEEP APNEA): ICD-10-CM

## 2025-05-21 PROCEDURE — 36415 COLL VENOUS BLD VENIPUNCTURE: CPT

## 2025-05-21 PROCEDURE — 1123F ACP DISCUSS/DSCN MKR DOCD: CPT

## 2025-05-21 PROCEDURE — 3074F SYST BP LT 130 MM HG: CPT

## 2025-05-21 PROCEDURE — 99214 OFFICE O/P EST MOD 30 MIN: CPT

## 2025-05-21 PROCEDURE — 3078F DIAST BP <80 MM HG: CPT

## 2025-05-21 RX ORDER — TIRZEPATIDE 2.5 MG/.5ML
10 INJECTION, SOLUTION SUBCUTANEOUS
COMMUNITY
Start: 2025-04-24

## 2025-05-21 SDOH — ECONOMIC STABILITY: FOOD INSECURITY: WITHIN THE PAST 12 MONTHS, YOU WORRIED THAT YOUR FOOD WOULD RUN OUT BEFORE YOU GOT MONEY TO BUY MORE.: NEVER TRUE

## 2025-05-21 SDOH — ECONOMIC STABILITY: FOOD INSECURITY: WITHIN THE PAST 12 MONTHS, THE FOOD YOU BOUGHT JUST DIDN'T LAST AND YOU DIDN'T HAVE MONEY TO GET MORE.: NEVER TRUE

## 2025-05-21 ASSESSMENT — PATIENT HEALTH QUESTIONNAIRE - PHQ9
8. MOVING OR SPEAKING SO SLOWLY THAT OTHER PEOPLE COULD HAVE NOTICED. OR THE OPPOSITE, BEING SO FIGETY OR RESTLESS THAT YOU HAVE BEEN MOVING AROUND A LOT MORE THAN USUAL: NOT AT ALL
3. TROUBLE FALLING OR STAYING ASLEEP: SEVERAL DAYS
SUM OF ALL RESPONSES TO PHQ QUESTIONS 1-9: 8
6. FEELING BAD ABOUT YOURSELF - OR THAT YOU ARE A FAILURE OR HAVE LET YOURSELF OR YOUR FAMILY DOWN: NEARLY EVERY DAY
1. LITTLE INTEREST OR PLEASURE IN DOING THINGS: NOT AT ALL
5. POOR APPETITE OR OVEREATING: NOT AT ALL
10. IF YOU CHECKED OFF ANY PROBLEMS, HOW DIFFICULT HAVE THESE PROBLEMS MADE IT FOR YOU TO DO YOUR WORK, TAKE CARE OF THINGS AT HOME, OR GET ALONG WITH OTHER PEOPLE: NOT DIFFICULT AT ALL
2. FEELING DOWN, DEPRESSED OR HOPELESS: NEARLY EVERY DAY
4. FEELING TIRED OR HAVING LITTLE ENERGY: SEVERAL DAYS
7. TROUBLE CONCENTRATING ON THINGS, SUCH AS READING THE NEWSPAPER OR WATCHING TELEVISION: NOT AT ALL
SUM OF ALL RESPONSES TO PHQ QUESTIONS 1-9: 8

## 2025-05-21 NOTE — PROGRESS NOTES
Wallace Nieves (:  1950) is a 75 y.o. male, here for evaluation of the following chief complaint(s):  Follow-up (Pt was at the er for a fall recently ) and Diabetes (Fasting are 80's-90's)         Assessment & Plan  1. Diabetes Mellitus  - Last A1c 7.7%, up from 7.2% in 2024  - [facility]  - NovoLog adjusted to 12 units due to weight loss and improved readings  - [facility]he[facility] in 2025    2. Weight Management  - On Zepbound (tirzepatide) for 3.5 months, increased to 10 mg  - Lost 18 pounds  - Well tolerated  - Continue current dosage    3. Atrial Fibrillation  - History of atrial fibrillation, asymptomatic  - Heart murmur detected  - Discuss murmur with cardiologist next month    4. Left Hand Numbness  - Peripheral neuropathy likely secondary to diabetes  - EMG ordered for carpal tunnel syndrome, already evaluated by hand surgeon  - Wear gloves, keep hands warm for cold sensitivity recommended by his specialist  - No changes or interventions    5. Mood Disorder  - Suicidal thoughts without plans or intent. Not currently actively suicidal  - Coping with smoking joints, eases pain but increases thoughts  - Advised to contact hotlines or office for support  - No changes to management    6. Left Arm Swelling  - Mild edema of left wrist post-fall, x-ray shows no acute fracture or dislocation  - Keep arm elevated, especially during sleep  - No further intervention    7. Murmur  - New  - Appreciated on physical exam today, not previously noted  - Patient follows up with his cardiologist , recommended further evaluation for his murmur by his cardiologist  -Reviewed most recent echocardiogram from 2024, DELPHINE, showing EF of 55%, mild to moderate tricuspid regurg and mild mitral regurg.  Aortic valve, specifically coronary cusp is calcified and appears fixed in place per echocardiogram otherwise no evidence of aortic valve regurgitation  Follow-up  - Follow up in

## 2025-05-22 ENCOUNTER — RESULTS FOLLOW-UP (OUTPATIENT)
Dept: FAMILY MEDICINE CLINIC | Age: 75
End: 2025-05-22

## 2025-05-22 LAB
ALBUMIN SERPL-MCNC: 4.1 G/DL (ref 3.4–5)
ALBUMIN/GLOB SERPL: 1.7 {RATIO} (ref 1.1–2.2)
ALP SERPL-CCNC: 95 U/L (ref 40–129)
ALT SERPL-CCNC: 24 U/L (ref 10–40)
ANION GAP SERPL CALCULATED.3IONS-SCNC: 11 MMOL/L (ref 3–16)
AST SERPL-CCNC: 21 U/L (ref 15–37)
BASOPHILS # BLD: 0.1 K/UL (ref 0–0.2)
BASOPHILS NFR BLD: 1.2 %
BILIRUB SERPL-MCNC: 0.7 MG/DL (ref 0–1)
BUN SERPL-MCNC: 13 MG/DL (ref 7–20)
CALCIUM SERPL-MCNC: 9.3 MG/DL (ref 8.3–10.6)
CHLORIDE SERPL-SCNC: 102 MMOL/L (ref 99–110)
CHOLEST SERPL-MCNC: 104 MG/DL (ref 0–199)
CO2 SERPL-SCNC: 24 MMOL/L (ref 21–32)
CREAT SERPL-MCNC: 0.8 MG/DL (ref 0.8–1.3)
DEPRECATED RDW RBC AUTO: 15.7 % (ref 12.4–15.4)
EOSINOPHIL # BLD: 1 K/UL (ref 0–0.6)
EOSINOPHIL NFR BLD: 11.7 %
GFR SERPLBLD CREATININE-BSD FMLA CKD-EPI: >90 ML/MIN/{1.73_M2}
GLUCOSE SERPL-MCNC: 70 MG/DL (ref 70–99)
HCT VFR BLD AUTO: 37.1 % (ref 40.5–52.5)
HDLC SERPL-MCNC: 45 MG/DL (ref 40–60)
HGB BLD-MCNC: 12.5 G/DL (ref 13.5–17.5)
LDLC SERPL CALC-MCNC: 45 MG/DL
LYMPHOCYTES # BLD: 1.6 K/UL (ref 1–5.1)
LYMPHOCYTES NFR BLD: 19.3 %
MCH RBC QN AUTO: 30.6 PG (ref 26–34)
MCHC RBC AUTO-ENTMCNC: 33.9 G/DL (ref 31–36)
MCV RBC AUTO: 90.5 FL (ref 80–100)
MONOCYTES # BLD: 0.7 K/UL (ref 0–1.3)
MONOCYTES NFR BLD: 8.4 %
NEUTROPHILS # BLD: 5 K/UL (ref 1.7–7.7)
NEUTROPHILS NFR BLD: 59.4 %
PLATELET # BLD AUTO: 217 K/UL (ref 135–450)
PMV BLD AUTO: 8.7 FL (ref 5–10.5)
POTASSIUM SERPL-SCNC: 4.4 MMOL/L (ref 3.5–5.1)
PROT SERPL-MCNC: 6.5 G/DL (ref 6.4–8.2)
RBC # BLD AUTO: 4.09 M/UL (ref 4.2–5.9)
SODIUM SERPL-SCNC: 137 MMOL/L (ref 136–145)
TRIGL SERPL-MCNC: 72 MG/DL (ref 0–150)
VLDLC SERPL CALC-MCNC: 14 MG/DL
WBC # BLD AUTO: 8.4 K/UL (ref 4–11)

## 2025-06-05 NOTE — PROGRESS NOTES
cardiac medications as prescribed and tolerates them well.     Weight today is 393# (down 15# from 5/2024)  (Note initial weight [facility] 486#)    Patient is vaccinated against Covid. Moderna    Past Medical History:   has a past medical history of Arthritis, Asthma, BiPAP (biphasic positive airway pressure) dependence, CHF (congestive heart failure) (HCC), Colon cancer (HCC), CPAP (continuous positive airway pressure) dependence, Depression, Diabetes mellitus (HCC), MRSA (methicillin resistant staph aureus) culture positive, PE (pulmonary embolism), Sleep apnea, and Vitamin D deficiency.    Surgical History:   has a past surgical history that includes Knee Arthroplasty (Left); Colon surgery; hernia repair; Coronary angioplasty with stent (03/04/2015); Total shoulder arthroplasty (Right); Cardiac catheterization (04/27/2017); and Coronary artery bypass graft (05/02/2017).     Social History:   reports that he quit smoking about 2013. His smoking use included cigars. He has a 5.00 pack-year smoking history. He has never used smokeless tobacco. He reports current alcohol use. He reports current drug use. Drug: Marijuana.     Family History:  family history includes Heart Disease in his brother and mother.     Home Medications:  Prior to Admission medications    Medication Sig Start Date End Date Taking? Authorizing Provider   tirzepatide-weight management (ZEPBOUND) 2.5 MG/0.5ML SOLN subCUTAneous injection (VIAL) Inject 2 mLs into the skin 4/24/25  Yes ProviderDanay MD   spironolactone (ALDACTONE) 25 MG tablet TAKE 1 TABLET BY MOUTH DAILY 5/14/25  Yes Rafaela Wong MD   torsemide (DEMADEX) 20 MG tablet TAKE 2 TABLETS BY MOUTH DAILY 5/14/25  Yes Rafaela Wong MD   valsartan (DIOVAN) 80 MG tablet Take 1 tablet by mouth daily 5/5/25  Yes Rafaela Wong MD   atorvastatin (LIPITOR) 20 MG tablet Take 1 tablet by mouth daily 3/28/25  Yes Rafaela Wong MD   betamethasone valerate (VALISONE) 0.1 % cream Apply

## 2025-06-13 ENCOUNTER — TELEMEDICINE (OUTPATIENT)
Dept: FAMILY MEDICINE CLINIC | Age: 75
End: 2025-06-13

## 2025-06-13 ENCOUNTER — OFFICE VISIT (OUTPATIENT)
Dept: CARDIOLOGY CLINIC | Age: 75
End: 2025-06-13
Payer: COMMERCIAL

## 2025-06-13 VITALS
WEIGHT: 315 LBS | DIASTOLIC BLOOD PRESSURE: 58 MMHG | BODY MASS INDEX: 49.44 KG/M2 | OXYGEN SATURATION: 98 % | HEART RATE: 60 BPM | SYSTOLIC BLOOD PRESSURE: 94 MMHG | HEIGHT: 67 IN

## 2025-06-13 DIAGNOSIS — I48.0 PAF (PAROXYSMAL ATRIAL FIBRILLATION) (HCC): ICD-10-CM

## 2025-06-13 DIAGNOSIS — Z00.00 INITIAL MEDICARE ANNUAL WELLNESS VISIT: Primary | ICD-10-CM

## 2025-06-13 DIAGNOSIS — I50.32 CHRONIC DIASTOLIC CHF (CONGESTIVE HEART FAILURE) (HCC): Chronic | ICD-10-CM

## 2025-06-13 DIAGNOSIS — E78.2 MIXED HYPERLIPIDEMIA: ICD-10-CM

## 2025-06-13 DIAGNOSIS — I25.110 CORONARY ARTERY DISEASE INVOLVING NATIVE CORONARY ARTERY OF NATIVE HEART WITH UNSTABLE ANGINA PECTORIS (HCC): Primary | ICD-10-CM

## 2025-06-13 DIAGNOSIS — I10 ESSENTIAL HYPERTENSION: ICD-10-CM

## 2025-06-13 DIAGNOSIS — Z87.891 HISTORY OF TOBACCO ABUSE: ICD-10-CM

## 2025-06-13 DIAGNOSIS — Z95.1 S/P CABG (CORONARY ARTERY BYPASS GRAFT): ICD-10-CM

## 2025-06-13 PROCEDURE — 99214 OFFICE O/P EST MOD 30 MIN: CPT | Performed by: INTERNAL MEDICINE

## 2025-06-13 PROCEDURE — 3078F DIAST BP <80 MM HG: CPT | Performed by: INTERNAL MEDICINE

## 2025-06-13 PROCEDURE — 3074F SYST BP LT 130 MM HG: CPT | Performed by: INTERNAL MEDICINE

## 2025-06-13 PROCEDURE — 1123F ACP DISCUSS/DSCN MKR DOCD: CPT | Performed by: INTERNAL MEDICINE

## 2025-06-13 PROCEDURE — 1159F MED LIST DOCD IN RCRD: CPT | Performed by: INTERNAL MEDICINE

## 2025-06-13 ASSESSMENT — PATIENT HEALTH QUESTIONNAIRE - PHQ9
5. POOR APPETITE OR OVEREATING: NOT AT ALL
SUM OF ALL RESPONSES TO PHQ QUESTIONS 1-9: 9
4. FEELING TIRED OR HAVING LITTLE ENERGY: SEVERAL DAYS
1. LITTLE INTEREST OR PLEASURE IN DOING THINGS: SEVERAL DAYS
7. TROUBLE CONCENTRATING ON THINGS, SUCH AS READING THE NEWSPAPER OR WATCHING TELEVISION: NOT AT ALL
8. MOVING OR SPEAKING SO SLOWLY THAT OTHER PEOPLE COULD HAVE NOTICED. OR THE OPPOSITE, BEING SO FIGETY OR RESTLESS THAT YOU HAVE BEEN MOVING AROUND A LOT MORE THAN USUAL: NOT AT ALL
3. TROUBLE FALLING OR STAYING ASLEEP: SEVERAL DAYS
SUM OF ALL RESPONSES TO PHQ QUESTIONS 1-9: 9
SUM OF ALL RESPONSES TO PHQ QUESTIONS 1-9: 9
9. THOUGHTS THAT YOU WOULD BE BETTER OFF DEAD, OR OF HURTING YOURSELF: NOT AT ALL
1. LITTLE INTEREST OR PLEASURE IN DOING THINGS: SEVERAL DAYS
6. FEELING BAD ABOUT YOURSELF - OR THAT YOU ARE A FAILURE OR HAVE LET YOURSELF OR YOUR FAMILY DOWN: NEARLY EVERY DAY
2. FEELING DOWN, DEPRESSED OR HOPELESS: NEARLY EVERY DAY
3. TROUBLE FALLING OR STAYING ASLEEP: SEVERAL DAYS
2. FEELING DOWN, DEPRESSED OR HOPELESS: NEARLY EVERY DAY
SUM OF ALL RESPONSES TO PHQ QUESTIONS 1-9: 9
4. FEELING TIRED OR HAVING LITTLE ENERGY: SEVERAL DAYS
SUM OF ALL RESPONSES TO PHQ QUESTIONS 1-9: 9
SUM OF ALL RESPONSES TO PHQ QUESTIONS 1-9: 9
10. IF YOU CHECKED OFF ANY PROBLEMS, HOW DIFFICULT HAVE THESE PROBLEMS MADE IT FOR YOU TO DO YOUR WORK, TAKE CARE OF THINGS AT HOME, OR GET ALONG WITH OTHER PEOPLE: NOT DIFFICULT AT ALL
6. FEELING BAD ABOUT YOURSELF - OR THAT YOU ARE A FAILURE OR HAVE LET YOURSELF OR YOUR FAMILY DOWN: NEARLY EVERY DAY
5. POOR APPETITE OR OVEREATING: NOT AT ALL
SUM OF ALL RESPONSES TO PHQ QUESTIONS 1-9: 9
SUM OF ALL RESPONSES TO PHQ QUESTIONS 1-9: 9

## 2025-06-13 ASSESSMENT — LIFESTYLE VARIABLES
HOW OFTEN DO YOU HAVE A DRINK CONTAINING ALCOHOL: NEVER
HOW MANY STANDARD DRINKS CONTAINING ALCOHOL DO YOU HAVE ON A TYPICAL DAY: PATIENT DOES NOT DRINK

## 2025-06-13 NOTE — PATIENT INSTRUCTIONS
Labs reviewed in epic and discussed with patient.  Medications reviewed in office. Medications refilled as warranted  No cardiac testing at this time    Please call in November to make your next appointment. Our 2026 schedule is not out yet. [phone].

## 2025-06-13 NOTE — PROGRESS NOTES
General (Family Medicine)  Rafaela Wong MD as PCP - Empaneled Provider  Onel López MD as Consulting Physician (Cardiology)     Recommendations for Preventive Services Due: see orders and patient instructions/AVS.  Recommended screening schedule for the next 5-10 years is provided to the patient in written form: see Patient Instructions/AVS.     Reviewed and updated this visit:  Tobacco  Allergies  Meds  Problems  Med Hx  Surg Hx  Fam Hx  Sexual   Hx            I, Valorie Jaramillo LPN, 6/13/2025, performed the documented evaluation under the direct supervision of the attending physician.  Wallace Nieves, was evaluated through a synchronous (real-time) audio-video encounter. The patient (or guardian if applicable) is aware that this is a billable service, which includes applicable co-pays. This Virtual Visit was conducted with patient's (and/or legal guardian's) consent. Patient identification was verified, and a caregiver was present when appropriate.   The patient was located at Home: [address] [place] [address] [place] [address]  Provider was located at Facility (Appt Dept): [address] [place] [id number]  Confirm you are appropriately licensed, registered, or certified to deliver care in the state where the patient is located as indicated above. If you are not or unsure, please re-schedule the visit: Yes, I confirm.        This encounter was performed under myRafaela MD’s, direct supervision, 06/13/25.

## 2025-06-22 DIAGNOSIS — E78.5 HYPERLIPIDEMIA, UNSPECIFIED HYPERLIPIDEMIA TYPE: ICD-10-CM

## 2025-06-23 RX ORDER — ATORVASTATIN CALCIUM 20 MG/1
20 TABLET, FILM COATED ORAL DAILY
Qty: 90 TABLET | Refills: 1 | Status: SHIPPED | OUTPATIENT
Start: 2025-06-23

## 2025-06-23 NOTE — TELEPHONE ENCOUNTER
Refill Request     CONFIRM preferrred pharmacy with the patient.    If Mail Order Rx - Pend for 90 day refill.      Last Seen: Last Seen Department: 6/13/2025  Last Seen by PCP: 6/13/2025    Last Written: 3-    If no future appointment scheduled, route STAFF MESSAGE with patient name to the  Pool for scheduling.      Next Appointment:   Future Appointments   Date Time Provider Department Center   7/30/2025  1:00 PM Aixa Howell, MIROSLAVA - CNP [facility]   12/29/2025  8:50 AM [facility]   6/19/2026  1:30 PM SCHEDULE, [facility], LPN [facility] DE[facility]       Message sent to  to schedule appt with patient?  N/A      Requested Prescriptions     Pending Prescriptions Disp Refills    atorvastatin (LIPITOR) 20 MG tablet [Pharmacy Med Name: ATORVASTATIN 20 MG TABLET] 90 tablet 0     Sig: TAKE 1 TABLET BY MOUTH DAILY

## 2025-07-14 RX ORDER — SPIRONOLACTONE 25 MG/1
25 TABLET ORAL DAILY
Qty: 90 TABLET | Refills: 0 | Status: SHIPPED | OUTPATIENT
Start: 2025-07-14

## 2025-07-14 RX ORDER — VALSARTAN 80 MG/1
80 TABLET ORAL DAILY
Qty: 90 TABLET | Refills: 0 | Status: SHIPPED | OUTPATIENT
Start: 2025-07-14

## 2025-07-14 RX ORDER — TORSEMIDE 20 MG/1
40 TABLET ORAL DAILY
Qty: 180 TABLET | Refills: 0 | Status: SHIPPED | OUTPATIENT
Start: 2025-07-14

## 2025-07-14 NOTE — TELEPHONE ENCOUNTER
Refill Request     CONFIRM preferrred pharmacy with the patient.    If Mail Order Rx - Pend for 90 day refill.      Last Seen: Last Seen Department: 6/13/2025  Last Seen by PCP: 6/13/2025    Last Written: 5/14/25    If no future appointment scheduled, route STAFF MESSAGE with patient name to the  Pool for scheduling.      Next Appointment:   Future Appointments   Date Time Provider Department Center   7/30/2025  1:00 PM Aixa Howell, MIROSLAVA - CNP [facility]   12/29/2025  8:50 AM [facility]   6/19/2026  1:30 PM SCHEDULE, [facility], LPN [facility] DE[facility]       Message sent to  to schedule appt with patient?  N/A      Requested Prescriptions     Pending Prescriptions Disp Refills    spironolactone (ALDACTONE) 25 MG tablet 90 tablet 0     Sig: Take 1 tablet by mouth daily

## 2025-07-14 NOTE — TELEPHONE ENCOUNTER
Refill Request     CONFIRM preferrred pharmacy with the patient.    If Mail Order Rx - Pend for 90 day refill.      Last Seen: Last Seen Department: 6/13/2025  Last Seen by PCP: 6/13/2025    Last Written: 5/5/25 valsartan  Torsemide 5/14/25    If no future appointment scheduled, route STAFF MESSAGE with patient name to the  Pool for scheduling.      Next Appointment:   Future Appointments   Date Time Provider Department [place]   7/30/2025  1:00 PM Aixa Howell APRN - CNP [facility]   12/29/2025  8:50 AM [facility]a [facility] MD [facility]   6/19/2026  1:30 PM SCHEDULE, MHCX NOLBERTO SPENCER, GIDEON [facility] DE[facility]       Message sent to  to schedule appt with patient?  N/A      Requested Prescriptions     Pending Prescriptions Disp Refills    valsartan (DIOVAN) 80 MG tablet 90 tablet 0     Sig: Take 1 tablet by mouth daily

## 2025-07-26 ENCOUNTER — APPOINTMENT (OUTPATIENT)
Dept: GENERAL RADIOLOGY | Age: 75
End: 2025-07-26
Payer: COMMERCIAL

## 2025-07-26 ENCOUNTER — HOSPITAL ENCOUNTER (EMERGENCY)
Age: 75
Discharge: HOME | End: 2025-07-26
Payer: COMMERCIAL

## 2025-07-26 VITALS
HEART RATE: 67 BPM | SYSTOLIC BLOOD PRESSURE: 141 MMHG | DIASTOLIC BLOOD PRESSURE: 59 MMHG | TEMPERATURE: 97.9 F | OXYGEN SATURATION: 96 % | RESPIRATION RATE: 19 BRPM

## 2025-07-26 DIAGNOSIS — M25.532 LEFT WRIST PAIN: Primary | ICD-10-CM

## 2025-07-26 PROCEDURE — 73110 X-RAY EXAM OF WRIST: CPT

## 2025-07-26 PROCEDURE — 29125 APPL SHORT ARM SPLINT STATIC: CPT

## 2025-07-26 PROCEDURE — 99283 EMERGENCY DEPT VISIT LOW MDM: CPT

## 2025-07-26 PROCEDURE — 6370000000 HC RX 637 (ALT 250 FOR IP): Performed by: NURSE PRACTITIONER

## 2025-07-26 PROCEDURE — 73130 X-RAY EXAM OF HAND: CPT

## 2025-07-26 RX ORDER — TRAMADOL HYDROCHLORIDE 50 MG/1
50 TABLET ORAL EVERY 8 HOURS PRN
Qty: 6 TABLET | Refills: 0 | Status: SHIPPED | OUTPATIENT
Start: 2025-07-26 | End: 2025-07-28

## 2025-07-26 RX ORDER — OXYCODONE AND ACETAMINOPHEN 5; 325 MG/1; MG/1
1 TABLET ORAL ONCE
Refills: 0 | Status: COMPLETED | OUTPATIENT
Start: 2025-07-26 | End: 2025-07-26

## 2025-07-26 RX ADMIN — OXYCODONE HYDROCHLORIDE AND ACETAMINOPHEN 1 TABLET: 5; 325 TABLET ORAL at 11:17

## 2025-07-26 ASSESSMENT — ENCOUNTER SYMPTOMS
RHINORRHEA: 0
SHORTNESS OF BREATH: 0
ABDOMINAL PAIN: 0
FACIAL SWELLING: 0
SORE THROAT: 0
COLOR CHANGE: 0

## 2025-07-26 ASSESSMENT — PAIN - FUNCTIONAL ASSESSMENT
PAIN_FUNCTIONAL_ASSESSMENT: 0-10
PAIN_FUNCTIONAL_ASSESSMENT: 0-10

## 2025-07-26 ASSESSMENT — PAIN SCALES - GENERAL
PAINLEVEL_OUTOF10: 10
PAINLEVEL_OUTOF10: 8
PAINLEVEL_OUTOF10: 10

## 2025-07-26 NOTE — ED NOTES
Applied ulnar gutter to patients left arm. Patient understood instruction  on splint and how to monitor fingers and circulation. Patient also in sling

## 2025-07-26 NOTE — ED PROVIDER NOTES
[facility] EMERGENCY DEPARTMENT  EMERGENCY DEPARTMENT ENCOUNTER      I am the Primary Clinician of Record    Note started: 10:52 AM EDT 7/26/25    CONNIE. I have evaluated this patient.          Pt Name: Wallace Nieves  MRN: [id number]  Birthdate 1950  Dateof evaluation: 7/26/2025  Provider: Sophia Schilling, APRN - CNP  PCP: Rafaela Wong MD  ED Attending: No att. providers found      CHIEF COMPLAINT       Chief Complaint   Patient presents with    Wrist Injury     To left wrist, pain x 2 days, was helping load an electric wheelchair       HISTORY OF PRESENTILLNESS   (Location/Symptom, Timing/Onset, Context/Setting, Quality, Duration, Modifying Factors, Severity)  Note limiting factors.     Wallace Nieves is a 75 y.o. male for left wrist pain. Onset was 2 days.  Context includes patient states that he was helping a friend lift a motorized wheelchair when he injured his left wrist.  He denies falling.  Patient is right-hand dominant.  He did take ibuprofen earlier today with no relief.  He denies any other injury. Alleviating factors include nothing.  Aggravating factors include nothing. Pain is 10/10.  Ibuprofen yesterday has been used for pain today.     Nursing Notes were all reviewed and agreed with or any disagreements were addressed  in the HPI.      REVIEW OF SYSTEMS       Review of Systems   Constitutional:  Negative for activity change, appetite change and fever.   HENT:  Negative for congestion, facial swelling, rhinorrhea and sore throat.    Eyes:  Negative for visual disturbance.   Respiratory:  Negative for shortness of breath.    Cardiovascular:  Negative for chest pain.   Gastrointestinal:  Negative for abdominal pain.   Genitourinary:  Negative for difficulty urinating.   Musculoskeletal:  Negative for arthralgias and myalgias.        Left wrist pain and swelling   Skin:  Negative for color change and rash.   Neurological:  Negative for dizziness and light-headedness.

## 2025-07-28 ENCOUNTER — TELEPHONE (OUTPATIENT)
Dept: FAMILY MEDICINE CLINIC | Age: 75
End: 2025-07-28

## 2025-07-28 ENCOUNTER — TELEPHONE (OUTPATIENT)
Dept: ORTHOPEDIC SURGERY | Age: 75
End: 2025-07-28

## 2025-07-28 LAB
ALBUMIN: NORMAL
ALP BLD-CCNC: NORMAL U/L
ALT SERPL-CCNC: 15 U/L
ANION GAP SERPL CALCULATED.3IONS-SCNC: 11 MMOL/L
AST SERPL-CCNC: NORMAL U/L
BASOPHILS ABSOLUTE: ABNORMAL
BASOPHILS RELATIVE PERCENT: ABNORMAL
BILIRUB SERPL-MCNC: NORMAL MG/DL
BUN BLDV-MCNC: 18 MG/DL
CALCIUM SERPL-MCNC: 9.3 MG/DL
CHLORIDE BLD-SCNC: 100 MMOL/L
CHOLESTEROL, TOTAL: 100 MG/DL
CHOLESTEROL/HDL RATIO: NORMAL
CO2: 29 MMOL/L
CREAT SERPL-MCNC: 1 MG/DL
EOSINOPHILS ABSOLUTE: ABNORMAL
EOSINOPHILS RELATIVE PERCENT: ABNORMAL
ESTIMATED AVERAGE GLUCOSE: NORMAL
GFR, ESTIMATED: NORMAL
GLUCOSE BLD-MCNC: NORMAL MG/DL
HBA1C MFR BLD: 6.9 %
HCT VFR BLD CALC: 35.9 % (ref 41–53)
HDLC SERPL-MCNC: 41 MG/DL (ref 35–70)
HEMOGLOBIN: 11.4 G/DL (ref 13.5–17.5)
LDL CHOLESTEROL: NORMAL
LYMPHOCYTES ABSOLUTE: ABNORMAL
LYMPHOCYTES RELATIVE PERCENT: ABNORMAL
MCH RBC QN AUTO: 296 PG
MCHC RBC AUTO-ENTMCNC: 31.8 G/DL
MCV RBC AUTO: 93.2 FL
MONOCYTES ABSOLUTE: ABNORMAL
MONOCYTES RELATIVE PERCENT: ABNORMAL
NEUTROPHILS ABSOLUTE: ABNORMAL
NEUTROPHILS RELATIVE PERCENT: ABNORMAL
NONHDLC SERPL-MCNC: NORMAL MG/DL
PLATELET # BLD: 227 K/ΜL
PMV BLD AUTO: 10.3 FL
POTASSIUM SERPL-SCNC: 4 MMOL/L
RBC # BLD: 3.85 10^6/ΜL
SODIUM BLD-SCNC: 136 MMOL/L
TOTAL PROTEIN: NORMAL
TRIGL SERPL-MCNC: 68 MG/DL
VLDLC SERPL CALC-MCNC: NORMAL MG/DL
WBC # BLD: 10.69 10^3/ML

## 2025-07-28 NOTE — TELEPHONE ENCOUNTER
Called pt wife and said that hans scheduled them for 7/30 at 10am. No appts were made. Writer went ahead and place pt in that time slot.

## 2025-07-28 NOTE — TELEPHONE ENCOUNTER
Pt's wife called and stated that pt needs an ED follow up. Stated that he was in the ED on 7/28 for his left wrist and told him to follow up with his pcp. Was wanting to see where he could be scheduled. Please advice.

## 2025-07-28 NOTE — TELEPHONE ENCOUNTER
----- Message from Kierra MEDINA sent at 7/28/2025 10:04 AM EDT -----  Regarding: Specialist Appointment Request - New  Specialist Appointment Request - New    Referral on File?: Yes/No: Yes    What Specialty? Select Speciality: Orthopedics     Reason for referral or appointment? [Left wrist /separation between bones     Scheduling Preference per Patient:  micah office any physician     Additional Information:  --------------------------------------------------------------------------------------------------------------------------    Relationship to Patient: Spouse/Partner mrs fitzgerald   Call Back Information: OK to leave message on voicemail  Preferred Call Back Number: [phone]

## 2025-07-28 NOTE — TELEPHONE ENCOUNTER
----- Message from Kierra MEDINA sent at 7/28/2025 10:04 AM EDT -----  Regarding: Specialist Appointment Request - New  Specialist Appointment Request - New    Referral on File?: Yes/No: Yes    What Specialty? Select Speciality: Orthopedics     Reason for referral or appointment? [Left wrist /separation between bones     Scheduling Preference per Patient:  micah office any physician     Additional Information:  --------------------------------------------------------------------------------------------------------------------------    Relationship to Patient: Spouse/Partner mrs fitzgerald   Call Back Information: OK to leave message on voicemail  Preferred Call Back Number: [phone]

## 2025-07-30 ENCOUNTER — OFFICE VISIT (OUTPATIENT)
Dept: FAMILY MEDICINE CLINIC | Age: 75
End: 2025-07-30
Payer: COMMERCIAL

## 2025-07-30 VITALS
RESPIRATION RATE: 19 BRPM | DIASTOLIC BLOOD PRESSURE: 64 MMHG | OXYGEN SATURATION: 94 % | SYSTOLIC BLOOD PRESSURE: 119 MMHG | HEART RATE: 61 BPM

## 2025-07-30 DIAGNOSIS — S69.92XD INJURY OF LEFT WRIST, SUBSEQUENT ENCOUNTER: Primary | ICD-10-CM

## 2025-07-30 PROCEDURE — 1159F MED LIST DOCD IN RCRD: CPT

## 2025-07-30 PROCEDURE — 1123F ACP DISCUSS/DSCN MKR DOCD: CPT

## 2025-07-30 PROCEDURE — 3074F SYST BP LT 130 MM HG: CPT

## 2025-07-30 PROCEDURE — 3078F DIAST BP <80 MM HG: CPT

## 2025-07-30 PROCEDURE — 99213 OFFICE O/P EST LOW 20 MIN: CPT

## 2025-07-30 NOTE — PROGRESS NOTES
Wallace Nieves (:  1950) is a 75 y.o. male, here for evaluation of the following chief complaint(s):  Follow-up (Pt was seen in the ER for a Wrist Injury at [facility] on 25. Splint was placed in the ER)       Assessment & Plan  1. Left wrist pain:  - Imaging showed no acute bony abnormalities but mild to moderate degenerative changes.  - Sensation of the 4th and 5th digits of left hand improved after removal splint  - Recommended Voltaren gel up to four times daily for inflammation.  - Continue scheduled follow-up with Dr. Chang tomorrow.    2. Exemption from seatbelt use:  - Patient explains that many years ago after his CABG and colon cancer surgery, his previous PCP wrote him a note exempting him from wearing his seatbelt while driving due to the irritation it causes on his chest and abdomen.  This note has since degraded over time as it has been folded in his wallet for years  - I did discuss with patient that this is an unusual request, and clarified with him if he understood the risks associated with not wearing a seatbelt which included fatality.  Patient verbalized understanding of the risks associated with not wearing seatbelt  - Provided note exempting him from wearing a seatbelt due to prior medical conditions and surgeries with the note specifically stating that patient understands the risks with not wearing a seatbelt    Results  Imaging   - X-ray of the left wrist: 2025, No acute bony abnormalities, metallic fixation ivan in distal left radius, posttraumatic findings suggestive of age, both bone midshaft forearm fractures, normal alignment, mild to moderate arthritis in the joint and interphalangeal joints of the thumb, no effusion or swelling within joint spaces.   - X-ray of the left hand: 2025, No acute bony abnormalities, mild to moderate degenerative changes of the thumb and fingers.  1. Injury of left wrist, subsequent encounter    No follow-ups on file.       Subjective

## 2025-07-31 ENCOUNTER — OFFICE VISIT (OUTPATIENT)
Dept: ORTHOPEDIC SURGERY | Age: 75
End: 2025-07-31

## 2025-07-31 VITALS — WEIGHT: 315 LBS | BODY MASS INDEX: 49.44 KG/M2 | HEIGHT: 67 IN

## 2025-07-31 DIAGNOSIS — E11.8 DIABETES MELLITUS TYPE 2 WITH COMPLICATIONS (HCC): ICD-10-CM

## 2025-07-31 DIAGNOSIS — I25.110 CORONARY ARTERY DISEASE INVOLVING NATIVE CORONARY ARTERY OF NATIVE HEART WITH UNSTABLE ANGINA PECTORIS (HCC): Chronic | ICD-10-CM

## 2025-07-31 DIAGNOSIS — Z87.81 HISTORY OF FOREARM FRACTURE: ICD-10-CM

## 2025-07-31 DIAGNOSIS — M25.532 ACUTE PAIN OF LEFT WRIST: ICD-10-CM

## 2025-07-31 DIAGNOSIS — S69.82XA TFCC (TRIANGULAR FIBROCARTILAGE COMPLEX) INJURY, LEFT, INITIAL ENCOUNTER: Primary | ICD-10-CM

## 2025-07-31 DIAGNOSIS — I26.09 OTHER ACUTE PULMONARY EMBOLISM WITH ACUTE COR PULMONALE (HCC): Chronic | ICD-10-CM

## 2025-07-31 NOTE — PROGRESS NOTES
PHYSICAL EXAM    Vital Signs:  Ht 1.702 m (5' 7\")   Wt (!) 178.3 kg (393 lb)   BMI 61.55 kg/m²   General Appearance:  Normal body habitus. Alert and oriented to person, place, and time.   Affect:  Normal.   Gait:  Normal. Good balance and coordination.   Reflexes:  Intact.   Pulses:  2+ radial pulses with brisk capillary refill to all fingers.   Skin:  Normal.     Wrist Exam:  Hand dominance -right  Surface Exam -he has some minimal swelling over the ulnar styloid region of the left wrist.  He has exquisite pain in the triangular fibrocartilage complex region to direct palpation where he has some swelling into stress.  There is no crepitus.  I do not feel I was able to do an adequate exam because of pain with stress noted.    Neurologic Exam:  Reflexes:  Normal.   Tinels:  Normal.   Phalens:  Negative.   Median Nerve Compression:  Negative.   Thenar strength:  Normal.   Thenar atrophy:  Absent.   Sensation:  Normal in the median, radial, and ulnar nerve distributions.     Wrist Motion Right Left   DF     PF     RD     CMC     UD     SUP     PRO     IMAGING STUDIES  X-rays 4 views of the left distal forearm and 3 views of the left wrist are unremarkable for acute injury although he does has noted to have the [place] ivan in the radius in good position with well-healed radius and ulna fractures in excellent alignment and position.  There may be some degeneration in the pisiform region but no traumatic issues per se.    IMPRESSION    Left wrist triangular fibrocartilage complex injury possible tear with acute pain  History of forearm fracture  History of a CABG  History of pulmonary embolism  Diabetes mellitus type 2      PLAN    1.  Conservative care options including physical therapy, NSAIDs, bracing, and activity modification were discussed.   2.  The indications for therapeutic injections were discussed.   3.  The indications for additional imaging studies were discussed.   4.  After considering the various

## 2025-08-20 ENCOUNTER — OFFICE VISIT (OUTPATIENT)
Dept: ORTHOPEDIC SURGERY | Age: 75
End: 2025-08-20
Payer: COMMERCIAL

## 2025-08-20 VITALS — BODY MASS INDEX: 49.44 KG/M2 | HEIGHT: 67 IN | WEIGHT: 315 LBS

## 2025-08-20 DIAGNOSIS — S69.82XD TFCC (TRIANGULAR FIBROCARTILAGE COMPLEX) INJURY, LEFT, SUBSEQUENT ENCOUNTER: Primary | ICD-10-CM

## 2025-08-20 PROCEDURE — 1123F ACP DISCUSS/DSCN MKR DOCD: CPT | Performed by: ORTHOPAEDIC SURGERY

## 2025-08-20 PROCEDURE — 1125F AMNT PAIN NOTED PAIN PRSNT: CPT | Performed by: ORTHOPAEDIC SURGERY

## 2025-08-20 PROCEDURE — 99213 OFFICE O/P EST LOW 20 MIN: CPT | Performed by: ORTHOPAEDIC SURGERY
